# Patient Record
Sex: MALE | Race: WHITE | NOT HISPANIC OR LATINO | Employment: OTHER | URBAN - METROPOLITAN AREA
[De-identification: names, ages, dates, MRNs, and addresses within clinical notes are randomized per-mention and may not be internally consistent; named-entity substitution may affect disease eponyms.]

---

## 2017-02-03 ENCOUNTER — APPOINTMENT (OUTPATIENT)
Dept: GENERAL RADIOLOGY | Facility: HOSPITAL | Age: 72
End: 2017-02-03

## 2017-02-03 ENCOUNTER — HOSPITAL ENCOUNTER (EMERGENCY)
Facility: HOSPITAL | Age: 72
Discharge: HOME | End: 2017-02-03
Attending: EMERGENCY MEDICINE | Admitting: EMERGENCY MEDICINE

## 2017-02-03 VITALS
HEIGHT: 68 IN | HEART RATE: 64 BPM | RESPIRATION RATE: 18 BRPM | BODY MASS INDEX: 20.76 KG/M2 | WEIGHT: 137 LBS | TEMPERATURE: 98.3 F | OXYGEN SATURATION: 98 % | SYSTOLIC BLOOD PRESSURE: 132 MMHG | DIASTOLIC BLOOD PRESSURE: 64 MMHG

## 2017-02-03 DIAGNOSIS — K59.00 CONSTIPATION, UNSPECIFIED CONSTIPATION TYPE: Primary | ICD-10-CM

## 2017-02-03 PROCEDURE — 99284 EMERGENCY DEPT VISIT MOD MDM: CPT

## 2017-02-03 PROCEDURE — 74000 HC ABDOMEN KUB: CPT

## 2017-02-03 RX ORDER — DOCUSATE SODIUM 100 MG/1
100 CAPSULE, LIQUID FILLED ORAL 2 TIMES DAILY PRN
Qty: 60 CAPSULE | Refills: 0 | Status: SHIPPED | OUTPATIENT
Start: 2017-02-03

## 2017-02-04 NOTE — ED PROVIDER NOTES
"Subjective   Patient is a 71 y.o. male presenting with constipation.   History provided by:  Patient   used: No    Constipation   Severity:  Mild  Time since last bowel movement:  3 days  Timing:  Intermittent  Progression:  Waxing and waning  Chronicity:  Recurrent  Context: not dietary changes, not medication and not stress    Stool description:  Formed and large  Relieved by:  Miralax  Worsened by:  Nothing  Ineffective treatments:  None tried  Associated symptoms: flatus    Associated symptoms: no abdominal pain, no anorexia, no back pain, no diarrhea, no fever, no nausea and no vomiting    Risk factors: no change in medication      71-year-old male states he was referred to emergency department by his primary care provider for constipation and evaluation for possible \"fecal impaction\".  States no bowel movement around 3 days.  Started taking MiraLAX this morning.  No fevers chills sweats abdominal pain chest pain decreased or increased flatus, no melena hematochezia flank pain dysuria hematuria pyuria.    Review of Systems   Constitutional: Negative for fever.   Gastrointestinal: Positive for constipation and flatus. Negative for abdominal pain, anorexia, diarrhea, nausea and vomiting.   Musculoskeletal: Negative for back pain.   All other systems reviewed and are negative.      Past Medical History   Diagnosis Date   • Coronary artery disease    • Diabetes mellitus    • Diabetic retinopathy    • Hypertension    • Injury of back    • Legally blind    • Myocardial infarction    • Stroke        No Known Allergies    Past Surgical History   Procedure Laterality Date   • Cardiac surgery       cardiac stents   • Cardiac surgery       CABG   • Coronary angioplasty with stent placement     • Toe amputation     • Cataract extraction     • Eye surgery         History reviewed. No pertinent family history.    Social History     Social History   • Marital status:      Spouse name: N/A   • Number " of children: N/A   • Years of education: N/A     Social History Main Topics   • Smoking status: Current Every Day Smoker     Packs/day: 1.00   • Smokeless tobacco: None   • Alcohol use No   • Drug use: No   • Sexual activity: Not Asked     Other Topics Concern   • None     Social History Narrative           Objective   Physical Exam   Constitutional: He is oriented to person, place, and time. He appears well-developed and well-nourished. No distress.   HENT:   Head: Normocephalic and atraumatic.   Right Ear: External ear normal.   Left Ear: External ear normal.   Nose: Nose normal.   Mouth/Throat: Oropharynx is clear and moist. No oropharyngeal exudate.   Eyes: Conjunctivae and EOM are normal. Pupils are equal, round, and reactive to light. Right eye exhibits no discharge. Left eye exhibits no discharge. No scleral icterus.   Neck: Normal range of motion. Neck supple. No JVD present. No tracheal deviation present. No thyromegaly present.   Cardiovascular: Normal rate, regular rhythm, normal heart sounds and intact distal pulses.  Exam reveals no gallop and no friction rub.    No murmur heard.  Pulmonary/Chest: Effort normal and breath sounds normal. No stridor. No respiratory distress. He has no wheezes. He has no rales. He exhibits no tenderness.   Abdominal: Soft. Bowel sounds are normal. He exhibits no distension and no mass. There is no tenderness. There is no rebound and no guarding. No hernia.   Musculoskeletal: Normal range of motion. He exhibits no edema, tenderness or deformity.   Lymphadenopathy:     He has no cervical adenopathy.   Neurological: He is alert and oriented to person, place, and time. He has normal reflexes. He displays normal reflexes. No cranial nerve deficit. He exhibits normal muscle tone. Coordination normal.   Skin: Skin is warm and dry. No rash noted. He is not diaphoretic. No erythema. No pallor.   Psychiatric: He has a normal mood and affect. His behavior is normal. Judgment and  thought content normal.   Nursing note and vitals reviewed.      Procedures        No results found for this or any previous visit (from the past 24 hour(s)).  Note: In addition to lab results from this visit, the labs listed above may include labs taken at another facility or during a different encounter within the last 24 hours. Please correlate lab times with ED admission and discharge times for further clarification of the services performed during this visit.    XR Abdomen KUB   ED Interpretation   Increased stool         Vitals:    02/03/17 2115 02/03/17 2215 02/03/17 2300 02/03/17 2351   BP: 154/86 158/86 155/85 132/64   BP Location:    Right arm   Patient Position:    Sitting   Pulse: 74 71  64   Resp:    18   Temp:       TempSrc:       SpO2: 98% 98%     Weight:       Height:         Medications - No data to display  ECG/EMG Results (last 24 hours)     ** No results found for the last 24 hours. **      \    ED Course  ED Course   Comment By Time   Pt had large bowel movement while waiting to be seen Hector Orourke PA-C 02/04 0430                  [facility]    Final diagnoses:   Constipation, unspecified constipation type            Hector Orourke PA-C  02/04/17 0431

## 2017-02-04 NOTE — DISCHARGE INSTRUCTIONS
Continue taking your MiraLAX until your bowel movements are the consistency of softserve ice cream.  Follow-up with Dr. Ovalles early next week.  Return if any change or worsening.

## 2017-02-10 ENCOUNTER — TRANSCRIBE ORDERS (OUTPATIENT)
Dept: ADMINISTRATIVE | Facility: HOSPITAL | Age: 72
End: 2017-02-10

## 2017-02-10 DIAGNOSIS — Z87.891 PERSONAL HISTORY OF TOBACCO USE, PRESENTING HAZARDS TO HEALTH: Primary | ICD-10-CM

## 2017-02-16 ENCOUNTER — HOSPITAL ENCOUNTER (OUTPATIENT)
Dept: ULTRASOUND IMAGING | Facility: HOSPITAL | Age: 72
Discharge: HOME | End: 2017-02-16
Attending: INTERNAL MEDICINE

## 2017-02-16 DIAGNOSIS — Z87.891 PERSONAL HISTORY OF TOBACCO USE, PRESENTING HAZARDS TO HEALTH: ICD-10-CM

## 2017-02-16 PROCEDURE — 76775 US EXAM ABDO BACK WALL LIM: CPT

## 2017-02-20 ENCOUNTER — TRANSCRIBE ORDERS (OUTPATIENT)
Dept: ADMINISTRATIVE | Facility: HOSPITAL | Age: 72
End: 2017-02-20

## 2017-02-20 DIAGNOSIS — N28.9 POOR KIDNEY FUNCTION: Primary | ICD-10-CM

## 2017-02-27 ENCOUNTER — HOSPITAL ENCOUNTER (OUTPATIENT)
Dept: ULTRASOUND IMAGING | Facility: HOSPITAL | Age: 72
Discharge: HOME | End: 2017-02-27
Admitting: NURSE PRACTITIONER

## 2017-02-27 DIAGNOSIS — N28.9 POOR KIDNEY FUNCTION: ICD-10-CM

## 2017-02-27 PROCEDURE — 76775 US EXAM ABDO BACK WALL LIM: CPT

## 2017-04-21 ENCOUNTER — APPOINTMENT (OUTPATIENT)
Dept: GENERAL RADIOLOGY | Facility: HOSPITAL | Age: 72
End: 2017-04-21

## 2017-04-21 ENCOUNTER — HOSPITAL ENCOUNTER (EMERGENCY)
Facility: HOSPITAL | Age: 72
Discharge: HOME | End: 2017-04-21
Attending: EMERGENCY MEDICINE | Admitting: EMERGENCY MEDICINE

## 2017-04-21 VITALS
BODY MASS INDEX: 21.22 KG/M2 | OXYGEN SATURATION: 99 % | SYSTOLIC BLOOD PRESSURE: 131 MMHG | DIASTOLIC BLOOD PRESSURE: 76 MMHG | RESPIRATION RATE: 20 BRPM | WEIGHT: 140 LBS | TEMPERATURE: 98.1 F | HEIGHT: 68 IN | HEART RATE: 76 BPM

## 2017-04-21 DIAGNOSIS — S30.0XXA LUMBAR CONTUSION, INITIAL ENCOUNTER: ICD-10-CM
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
## 2017-04-21 DIAGNOSIS — S39.012A LOW BACK STRAIN, INITIAL ENCOUNTER: Primary | ICD-10-CM

## 2017-04-21 PROCEDURE — 99284 EMERGENCY DEPT VISIT MOD MDM: CPT

## 2017-04-21 PROCEDURE — 72100 X-RAY EXAM L-S SPINE 2/3 VWS: CPT

## 2017-04-21 RX ORDER — IBUPROFEN 400 MG/1
400 TABLET ORAL ONCE
Status: COMPLETED | OUTPATIENT
Start: 2017-04-21 | End: 2017-04-21

## 2017-04-21 RX ORDER — HYDROCODONE BITARTRATE AND ACETAMINOPHEN 5; 325 MG/1; MG/1
1 TABLET ORAL EVERY 4 HOURS PRN
Qty: 10 TABLET | Refills: 0 | Status: SHIPPED | OUTPATIENT
Start: 2017-04-21 | End: 2018-02-06 | Stop reason: SDUPTHER

## 2017-04-21 RX ORDER — POLYETHYLENE GLYCOL 3350 17 G/17G
17 POWDER, FOR SOLUTION ORAL DAILY
COMMUNITY
End: 2021-01-15

## 2017-04-21 RX ORDER — HYDROCODONE BITARTRATE AND ACETAMINOPHEN 5; 325 MG/1; MG/1
1 TABLET ORAL ONCE
Status: COMPLETED | OUTPATIENT
Start: 2017-04-21 | End: 2017-04-21

## 2017-04-21 RX ADMIN — IBUPROFEN 400 MG: 400 TABLET ORAL at 05:35

## 2017-04-21 RX ADMIN — HYDROCODONE BITARTRATE AND ACETAMINOPHEN 1 TABLET: 5; 325 TABLET ORAL at 05:35
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
## 2017-04-24 ENCOUNTER — TRANSCRIBE ORDERS (OUTPATIENT)
Dept: ADMINISTRATIVE | Facility: HOSPITAL | Age: 72
End: 2017-04-24

## 2017-04-24 DIAGNOSIS — M54.9 CVA TENDERNESS: ICD-10-CM

## 2017-04-24 DIAGNOSIS — R60.0 LOCALIZED EDEMA: ICD-10-CM

## 2017-04-24 DIAGNOSIS — M54.50 LUMBAR SPINE PAIN: ICD-10-CM

## 2017-04-24 DIAGNOSIS — M54.6 THORACIC SPINE PAIN: Primary | ICD-10-CM

## 2017-04-24 DIAGNOSIS — R07.81 RIB PAIN ON RIGHT SIDE: ICD-10-CM

## 2017-05-02 ENCOUNTER — HOSPITAL ENCOUNTER (OUTPATIENT)
Dept: CT IMAGING | Facility: HOSPITAL | Age: 72
Discharge: HOME | End: 2017-05-02

## 2017-05-02 ENCOUNTER — HOSPITAL ENCOUNTER (OUTPATIENT)
Dept: CT IMAGING | Facility: HOSPITAL | Age: 72
Discharge: HOME | End: 2017-05-02
Admitting: PHYSICIAN ASSISTANT

## 2017-05-02 DIAGNOSIS — R60.0 LOCALIZED EDEMA: ICD-10-CM

## 2017-05-02 DIAGNOSIS — M54.6 THORACIC SPINE PAIN: ICD-10-CM

## 2017-05-02 DIAGNOSIS — R07.81 RIB PAIN ON RIGHT SIDE: ICD-10-CM

## 2017-05-02 DIAGNOSIS — M54.9 CVA TENDERNESS: ICD-10-CM

## 2017-05-02 DIAGNOSIS — M54.50 LUMBAR SPINE PAIN: ICD-10-CM

## 2017-05-02 PROCEDURE — 71250 CT THORAX DX C-: CPT

## 2017-05-02 PROCEDURE — 74177 CT ABD & PELVIS W/CONTRAST: CPT

## 2017-05-02 PROCEDURE — 0 IOPAMIDOL 61 % SOLUTION: Performed by: PHYSICIAN ASSISTANT

## 2017-05-02 PROCEDURE — 72128 CT CHEST SPINE W/O DYE: CPT

## 2017-05-02 PROCEDURE — 72131 CT LUMBAR SPINE W/O DYE: CPT

## 2017-05-02 PROCEDURE — 82565 ASSAY OF CREATININE: CPT

## 2017-05-02 RX ADMIN — IOPAMIDOL 98 ML: 612 INJECTION, SOLUTION INTRAVENOUS at 10:10

## 2017-05-05 LAB — CREAT BLDA-MCNC: 1.1 MG/DL (ref 0.6–1.3)

## 2017-08-03 ENCOUNTER — APPOINTMENT (OUTPATIENT)
Dept: GENERAL RADIOLOGY | Facility: HOSPITAL | Age: 72
End: 2017-08-03

## 2017-08-03 ENCOUNTER — HOSPITAL ENCOUNTER (EMERGENCY)
Facility: HOSPITAL | Age: 72
Discharge: HOME | End: 2017-08-04
Attending: EMERGENCY MEDICINE | Admitting: EMERGENCY MEDICINE

## 2017-08-03 ENCOUNTER — APPOINTMENT (OUTPATIENT)
Dept: CT IMAGING | Facility: HOSPITAL | Age: 72
End: 2017-08-03

## 2017-08-03 DIAGNOSIS — S22.32XA CLOSED FRACTURE OF ONE RIB OF LEFT SIDE, INITIAL ENCOUNTER: Primary | ICD-10-CM

## 2017-08-03 LAB
ALBUMIN SERPL-MCNC: 3.8 G/DL (ref 3.2–4.8)
ALBUMIN/GLOB SERPL: 1.5 G/DL (ref 1.5–2.5)
ALP SERPL-CCNC: 78 U/L (ref 25–100)
ALT SERPL W P-5'-P-CCNC: 22 U/L (ref 7–40)
ANION GAP SERPL CALCULATED.3IONS-SCNC: 2 MMOL/L (ref 3–11)
AST SERPL-CCNC: 22 U/L (ref 0–33)
BASOPHILS # BLD AUTO: 0.03 10*3/MM3 (ref 0–0.2)
BASOPHILS NFR BLD AUTO: 0.4 % (ref 0–1)
BILIRUB SERPL-MCNC: 0.4 MG/DL (ref 0.3–1.2)
BUN BLD-MCNC: 20 MG/DL (ref 9–23)
BUN/CREAT SERPL: 18.2 (ref 7–25)
CALCIUM SPEC-SCNC: 8.7 MG/DL (ref 8.7–10.4)
CHLORIDE SERPL-SCNC: 108 MMOL/L (ref 99–109)
CO2 SERPL-SCNC: 26 MMOL/L (ref 20–31)
CREAT BLD-MCNC: 1.1 MG/DL (ref 0.6–1.3)
DEPRECATED RDW RBC AUTO: 46.4 FL (ref 37–54)
EOSINOPHIL # BLD AUTO: 0.13 10*3/MM3 (ref 0–0.3)
EOSINOPHIL NFR BLD AUTO: 1.7 % (ref 0–3)
ERYTHROCYTE [DISTWIDTH] IN BLOOD BY AUTOMATED COUNT: 14.4 % (ref 11.3–14.5)
GFR SERPL CREATININE-BSD FRML MDRD: 66 ML/MIN/1.73
GLOBULIN UR ELPH-MCNC: 2.6 GM/DL
GLUCOSE BLD-MCNC: 236 MG/DL (ref 70–100)
HCT VFR BLD AUTO: 31.7 % (ref 38.9–50.9)
HGB BLD-MCNC: 10.5 G/DL (ref 13.1–17.5)
HOLD SPECIMEN: NORMAL
HOLD SPECIMEN: NORMAL
IMM GRANULOCYTES # BLD: 0.02 10*3/MM3 (ref 0–0.03)
IMM GRANULOCYTES NFR BLD: 0.3 % (ref 0–0.6)
LYMPHOCYTES # BLD AUTO: 0.97 10*3/MM3 (ref 0.6–4.8)
LYMPHOCYTES NFR BLD AUTO: 12.7 % (ref 24–44)
MAGNESIUM SERPL-MCNC: 1.8 MG/DL (ref 1.3–2.7)
MCH RBC QN AUTO: 28.9 PG (ref 27–31)
MCHC RBC AUTO-ENTMCNC: 33.1 G/DL (ref 32–36)
MCV RBC AUTO: 87.3 FL (ref 80–99)
MONOCYTES # BLD AUTO: 0.82 10*3/MM3 (ref 0–1)
MONOCYTES NFR BLD AUTO: 10.7 % (ref 0–12)
NEUTROPHILS # BLD AUTO: 5.67 10*3/MM3 (ref 1.5–8.3)
NEUTROPHILS NFR BLD AUTO: 74.2 % (ref 41–71)
PLATELET # BLD AUTO: 164 10*3/MM3 (ref 150–450)
PMV BLD AUTO: 10.3 FL (ref 6–12)
POTASSIUM BLD-SCNC: 4.6 MMOL/L (ref 3.5–5.5)
PROT SERPL-MCNC: 6.4 G/DL (ref 5.7–8.2)
RBC # BLD AUTO: 3.63 10*6/MM3 (ref 4.2–5.76)
SODIUM BLD-SCNC: 136 MMOL/L (ref 132–146)
TROPONIN I SERPL-MCNC: 0 NG/ML (ref 0–0.07)
WBC NRBC COR # BLD: 7.64 10*3/MM3 (ref 3.5–10.8)
WHOLE BLOOD HOLD SPECIMEN: NORMAL
WHOLE BLOOD HOLD SPECIMEN: NORMAL

## 2017-08-03 PROCEDURE — 71101 X-RAY EXAM UNILAT RIBS/CHEST: CPT

## 2017-08-03 PROCEDURE — 0 IOPAMIDOL 61 % SOLUTION: Performed by: EMERGENCY MEDICINE

## 2017-08-03 PROCEDURE — 80053 COMPREHEN METABOLIC PANEL: CPT | Performed by: EMERGENCY MEDICINE

## 2017-08-03 PROCEDURE — 25010000002 ONDANSETRON PER 1 MG: Performed by: EMERGENCY MEDICINE

## 2017-08-03 PROCEDURE — 74177 CT ABD & PELVIS W/CONTRAST: CPT

## 2017-08-03 PROCEDURE — 99284 EMERGENCY DEPT VISIT MOD MDM: CPT

## 2017-08-03 PROCEDURE — 25010000002 MORPHINE PER 10 MG: Performed by: EMERGENCY MEDICINE

## 2017-08-03 PROCEDURE — 96374 THER/PROPH/DIAG INJ IV PUSH: CPT

## 2017-08-03 PROCEDURE — 93005 ELECTROCARDIOGRAM TRACING: CPT

## 2017-08-03 PROCEDURE — 96361 HYDRATE IV INFUSION ADD-ON: CPT

## 2017-08-03 PROCEDURE — 85025 COMPLETE CBC W/AUTO DIFF WBC: CPT

## 2017-08-03 PROCEDURE — 83735 ASSAY OF MAGNESIUM: CPT | Performed by: EMERGENCY MEDICINE

## 2017-08-03 PROCEDURE — 84484 ASSAY OF TROPONIN QUANT: CPT

## 2017-08-03 PROCEDURE — 96375 TX/PRO/DX INJ NEW DRUG ADDON: CPT

## 2017-08-03 RX ORDER — MORPHINE SULFATE 4 MG/ML
4 INJECTION, SOLUTION INTRAMUSCULAR; INTRAVENOUS ONCE
Status: COMPLETED | OUTPATIENT
Start: 2017-08-03 | End: 2017-08-03

## 2017-08-03 RX ORDER — DORZOLAMIDE HCL 20 MG/ML
1 SOLUTION/ DROPS OPHTHALMIC 2 TIMES DAILY
COMMUNITY

## 2017-08-03 RX ORDER — SODIUM CHLORIDE 0.9 % (FLUSH) 0.9 %
10 SYRINGE (ML) INJECTION AS NEEDED
Status: DISCONTINUED | OUTPATIENT
Start: 2017-08-03 | End: 2017-08-04 | Stop reason: HOSPADM

## 2017-08-03 RX ORDER — LATANOPROST 50 UG/ML
1 SOLUTION/ DROPS OPHTHALMIC NIGHTLY
COMMUNITY

## 2017-08-03 RX ORDER — FLUDROCORTISONE ACETATE 0.1 MG/1
0.1 TABLET ORAL DAILY
COMMUNITY

## 2017-08-03 RX ORDER — MELATONIN
1000 DAILY
COMMUNITY
End: 2018-05-18

## 2017-08-03 RX ORDER — ONDANSETRON 2 MG/ML
4 INJECTION INTRAMUSCULAR; INTRAVENOUS ONCE
Status: COMPLETED | OUTPATIENT
Start: 2017-08-03 | End: 2017-08-03

## 2017-08-03 RX ORDER — LORATADINE 10 MG/1
CAPSULE, LIQUID FILLED ORAL
COMMUNITY
End: 2018-05-18 | Stop reason: SDUPTHER

## 2017-08-03 RX ORDER — FUROSEMIDE 20 MG/1
20 TABLET ORAL 2 TIMES DAILY
COMMUNITY
End: 2018-02-03 | Stop reason: HOSPADM

## 2017-08-03 RX ADMIN — ONDANSETRON 4 MG: 2 INJECTION INTRAMUSCULAR; INTRAVENOUS at 21:17

## 2017-08-03 RX ADMIN — IOPAMIDOL 75 ML: 612 INJECTION, SOLUTION INTRAVENOUS at 21:44

## 2017-08-03 RX ADMIN — SODIUM CHLORIDE 1000 ML: 9 INJECTION, SOLUTION INTRAVENOUS at 21:21

## 2017-08-03 RX ADMIN — MORPHINE SULFATE 4 MG: 4 INJECTION, SOLUTION INTRAMUSCULAR; INTRAVENOUS at 21:20

## 2017-08-04 VITALS
SYSTOLIC BLOOD PRESSURE: 150 MMHG | RESPIRATION RATE: 20 BRPM | HEIGHT: 68 IN | DIASTOLIC BLOOD PRESSURE: 81 MMHG | HEART RATE: 79 BPM | WEIGHT: 135 LBS | OXYGEN SATURATION: 91 % | BODY MASS INDEX: 20.46 KG/M2 | TEMPERATURE: 98.1 F

## 2017-08-04 RX ORDER — HYDROCODONE BITARTRATE AND ACETAMINOPHEN 5; 325 MG/1; MG/1
1 TABLET ORAL EVERY 6 HOURS PRN
Qty: 15 TABLET | Refills: 0 | Status: SHIPPED | OUTPATIENT
Start: 2017-08-04 | End: 2018-02-03 | Stop reason: HOSPADM

## 2017-08-04 NOTE — ED PROVIDER NOTES
Subjective   HPI Comments: Mr. Martin Moctezuma is a 71 y.o. male who presents to the ED after a fall. He notes today that he felt as though he was hypoglycemic. He went to sit down to orient himself, missed his chair, and hit the floor on his back. He then reports that he had a syncopal episode shortly after and did not wake back up until EMS arrived. He was told his blood glucose was less than 20. He ate two peanut butter sandwiches and his blood sugar greatly elevated past 250. Since EMS left his house, he complains of pain in his lower back and left rib pain. He denies any SoA, or any other injuries or pains at this time. He has a hx of COPD and DM. He denies any anticoagulation use.     Patient is a 71 y.o. male presenting with fall.   History provided by:  Patient  Fall   Mechanism of injury: fall    Injury location:  Torso  Torso injury location:  Back  Incident location:  Home  Time since incident:  1 day  Arrived directly from scene: no    Fall:     Fall occurred: Trying to sit down.    Impact surface:  Unable to specify    Point of impact:  Back  Suspicion of alcohol use: no    Suspicion of drug use: no    Associated symptoms: back pain (Low) and chest pain (Left rib)    Associated symptoms: no abdominal pain and no neck pain    Risk factors: COPD and diabetes    Risk factors: no anticoagulation therapy        Review of Systems   Respiratory: Negative for shortness of breath.    Cardiovascular: Positive for chest pain (Left rib).   Gastrointestinal: Negative for abdominal pain.   Genitourinary: Negative for flank pain.   Musculoskeletal: Positive for back pain (Low). Negative for arthralgias and neck pain.   Neurological: Positive for syncope.   All other systems reviewed and are negative.      Past Medical History:   Diagnosis Date   • Coronary artery disease    • Diabetes mellitus    • Diabetic retinopathy    • Hypertension    • Injury of back    • Legally blind    • Myocardial infarction    • Stroke         No Known Allergies    Past Surgical History:   Procedure Laterality Date   • CARDIAC SURGERY      cardiac stents   • CARDIAC SURGERY      CABG   • CATARACT EXTRACTION     • CORONARY ANGIOPLASTY WITH STENT PLACEMENT     • EYE SURGERY     • TOE AMPUTATION         History reviewed. No pertinent family history.    Social History     Social History   • Marital status:      Spouse name: N/A   • Number of children: N/A   • Years of education: N/A     Social History Main Topics   • Smoking status: Current Every Day Smoker     Packs/day: 1.00   • Smokeless tobacco: None   • Alcohol use No   • Drug use: No   • Sexual activity: Not Asked     Other Topics Concern   • None     Social History Narrative         Objective   Physical Exam   Constitutional: He is oriented to person, place, and time. He appears well-developed and well-nourished. No distress.   HENT:   Head: Normocephalic and atraumatic.   Nose: Nose normal.   Eyes: Conjunctivae are normal. No scleral icterus.   Neck: Normal range of motion. Neck supple.   Cardiovascular: Normal rate, regular rhythm, normal heart sounds and intact distal pulses.    No murmur heard.  Pulmonary/Chest: Effort normal and breath sounds normal. No respiratory distress.   Abdominal: Soft. There is tenderness (Moderate RUQ). There is no rebound and no guarding.   Musculoskeletal: Normal range of motion. He exhibits tenderness.   Tenderness without deformity or crepitus over left CVA as well as lateral chest   Neurological: He is alert and oriented to person, place, and time.   Skin: Skin is warm and dry. He is not diaphoretic.   Abrasion on left flank over the 12th rib.   Psychiatric: He has a normal mood and affect. His behavior is normal.   Nursing note and vitals reviewed.      Procedures         ED Course  ED Course     CT negative for spleen or intra-abdominal injury but shows 11th rib fracture.  Labs benign.  CXR negative.  Patient stable on serial rechecks.  Discussed  findings, concerns, plan of care, expected course, reasons to return and followup.                  MDM  Number of Diagnoses or Management Options  Closed fracture of one rib of left side, initial encounter:      Amount and/or Complexity of Data Reviewed  Clinical lab tests: ordered and reviewed  Tests in the radiology section of CPT®: ordered and reviewed  Independent visualization of images, tracings, or specimens: yes        Final diagnoses:   Closed fracture of one rib of left side, initial encounter       Documentation assistance provided by iris BERRIOS.  Information recorded by the scribe was done at my direction and has been verified and validated by me.     Adore Berrios  08/03/17 3857       Torres Hollingsworth MD  08/04/17 0142

## 2018-01-29 ENCOUNTER — ANESTHESIA EVENT (OUTPATIENT)
Dept: PERIOP | Facility: HOSPITAL | Age: 73
End: 2018-01-29

## 2018-01-29 ENCOUNTER — APPOINTMENT (OUTPATIENT)
Dept: GENERAL RADIOLOGY | Facility: HOSPITAL | Age: 73
End: 2018-01-29

## 2018-01-29 ENCOUNTER — ANESTHESIA (OUTPATIENT)
Dept: PERIOP | Facility: HOSPITAL | Age: 73
End: 2018-01-29

## 2018-01-29 ENCOUNTER — HOSPITAL ENCOUNTER (INPATIENT)
Facility: HOSPITAL | Age: 73
LOS: 5 days | Discharge: NURSING, REHAB OR LONG-TERM CARE | End: 2018-02-03
Attending: EMERGENCY MEDICINE | Admitting: INTERNAL MEDICINE

## 2018-01-29 DIAGNOSIS — Z78.9 IMPAIRED MOBILITY AND ADLS: ICD-10-CM

## 2018-01-29 DIAGNOSIS — Z74.09 IMPAIRED FUNCTIONAL MOBILITY, BALANCE, GAIT, AND ENDURANCE: ICD-10-CM

## 2018-01-29 DIAGNOSIS — S72.141A CLOSED DISPLACED INTERTROCHANTERIC FRACTURE OF RIGHT FEMUR, INITIAL ENCOUNTER (HCC): ICD-10-CM

## 2018-01-29 DIAGNOSIS — S72.001A CLOSED FRACTURE OF RIGHT HIP, INITIAL ENCOUNTER (HCC): Primary | ICD-10-CM

## 2018-01-29 DIAGNOSIS — Z74.09 IMPAIRED MOBILITY AND ADLS: ICD-10-CM

## 2018-01-29 PROBLEM — F17.200 TOBACCO DEPENDENCE: Status: ACTIVE | Noted: 2018-01-29

## 2018-01-29 LAB
ABO GROUP BLD: NORMAL
ABO GROUP BLD: NORMAL
ANION GAP SERPL CALCULATED.3IONS-SCNC: 5 MMOL/L (ref 3–11)
BASOPHILS # BLD AUTO: 0.02 10*3/MM3 (ref 0–0.2)
BASOPHILS NFR BLD AUTO: 0.2 % (ref 0–1)
BLD GP AB SCN SERPL QL: NEGATIVE
BUN BLD-MCNC: 26 MG/DL (ref 9–23)
BUN/CREAT SERPL: 20 (ref 7–25)
CALCIUM SPEC-SCNC: 8.9 MG/DL (ref 8.7–10.4)
CHLORIDE SERPL-SCNC: 104 MMOL/L (ref 99–109)
CO2 SERPL-SCNC: 25 MMOL/L (ref 20–31)
CREAT BLD-MCNC: 1.3 MG/DL (ref 0.6–1.3)
DEPRECATED RDW RBC AUTO: 45.2 FL (ref 37–54)
EOSINOPHIL # BLD AUTO: 0.03 10*3/MM3 (ref 0–0.3)
EOSINOPHIL NFR BLD AUTO: 0.3 % (ref 0–3)
ERYTHROCYTE [DISTWIDTH] IN BLOOD BY AUTOMATED COUNT: 14.3 % (ref 11.3–14.5)
GFR SERPL CREATININE-BSD FRML MDRD: 55 ML/MIN/1.73
GLUCOSE BLD-MCNC: 203 MG/DL (ref 70–100)
GLUCOSE BLDC GLUCOMTR-MCNC: 109 MG/DL (ref 70–130)
GLUCOSE BLDC GLUCOMTR-MCNC: 193 MG/DL (ref 70–130)
GLUCOSE BLDC GLUCOMTR-MCNC: 196 MG/DL (ref 70–130)
HBA1C MFR BLD: 8.8 % (ref 4.8–5.6)
HCT VFR BLD AUTO: 29.7 % (ref 38.9–50.9)
HGB BLD-MCNC: 9.9 G/DL (ref 13.1–17.5)
IMM GRANULOCYTES # BLD: 0.02 10*3/MM3 (ref 0–0.03)
IMM GRANULOCYTES NFR BLD: 0.2 % (ref 0–0.6)
INR PPP: 0.98
LYMPHOCYTES # BLD AUTO: 0.77 10*3/MM3 (ref 0.6–4.8)
LYMPHOCYTES NFR BLD AUTO: 8 % (ref 24–44)
MCH RBC QN AUTO: 28.9 PG (ref 27–31)
MCHC RBC AUTO-ENTMCNC: 33.3 G/DL (ref 32–36)
MCV RBC AUTO: 86.6 FL (ref 80–99)
MONOCYTES # BLD AUTO: 0.95 10*3/MM3 (ref 0–1)
MONOCYTES NFR BLD AUTO: 9.9 % (ref 0–12)
NEUTROPHILS # BLD AUTO: 7.82 10*3/MM3 (ref 1.5–8.3)
NEUTROPHILS NFR BLD AUTO: 81.4 % (ref 41–71)
PLATELET # BLD AUTO: 160 10*3/MM3 (ref 150–450)
PMV BLD AUTO: 11 FL (ref 6–12)
POTASSIUM BLD-SCNC: 4.5 MMOL/L (ref 3.5–5.5)
PROTHROMBIN TIME: 10.7 SECONDS (ref 9.6–11.5)
RBC # BLD AUTO: 3.43 10*6/MM3 (ref 4.2–5.76)
RH BLD: POSITIVE
RH BLD: POSITIVE
SODIUM BLD-SCNC: 134 MMOL/L (ref 132–146)
WBC NRBC COR # BLD: 9.61 10*3/MM3 (ref 3.5–10.8)

## 2018-01-29 PROCEDURE — 0QS634Z REPOSITION RIGHT UPPER FEMUR WITH INTERNAL FIXATION DEVICE, PERCUTANEOUS APPROACH: ICD-10-PCS | Performed by: ORTHOPAEDIC SURGERY

## 2018-01-29 PROCEDURE — C1713 ANCHOR/SCREW BN/BN,TIS/BN: HCPCS | Performed by: ORTHOPAEDIC SURGERY

## 2018-01-29 PROCEDURE — 80048 BASIC METABOLIC PNL TOTAL CA: CPT | Performed by: EMERGENCY MEDICINE

## 2018-01-29 PROCEDURE — 76001 HC FLUORO GREATER THAN 1 HOUR: CPT

## 2018-01-29 PROCEDURE — 86850 RBC ANTIBODY SCREEN: CPT | Performed by: ORTHOPAEDIC SURGERY

## 2018-01-29 PROCEDURE — 25010000002 ONDANSETRON PER 1 MG: Performed by: NURSE ANESTHETIST, CERTIFIED REGISTERED

## 2018-01-29 PROCEDURE — 86900 BLOOD TYPING SEROLOGIC ABO: CPT

## 2018-01-29 PROCEDURE — 25010000003 CEFAZOLIN IN DEXTROSE 2-4 GM/100ML-% SOLUTION: Performed by: ORTHOPAEDIC SURGERY

## 2018-01-29 PROCEDURE — 83036 HEMOGLOBIN GLYCOSYLATED A1C: CPT | Performed by: INTERNAL MEDICINE

## 2018-01-29 PROCEDURE — 85025 COMPLETE CBC W/AUTO DIFF WBC: CPT | Performed by: EMERGENCY MEDICINE

## 2018-01-29 PROCEDURE — 86900 BLOOD TYPING SEROLOGIC ABO: CPT | Performed by: ORTHOPAEDIC SURGERY

## 2018-01-29 PROCEDURE — 86901 BLOOD TYPING SEROLOGIC RH(D): CPT

## 2018-01-29 PROCEDURE — 73552 X-RAY EXAM OF FEMUR 2/>: CPT

## 2018-01-29 PROCEDURE — 73501 X-RAY EXAM HIP UNI 1 VIEW: CPT

## 2018-01-29 PROCEDURE — 99223 1ST HOSP IP/OBS HIGH 75: CPT | Performed by: INTERNAL MEDICINE

## 2018-01-29 PROCEDURE — 85610 PROTHROMBIN TIME: CPT | Performed by: EMERGENCY MEDICINE

## 2018-01-29 PROCEDURE — 25810000003 SODIUM CHLORIDE 0.9 % WITH KCL 20 MEQ 20-0.9 MEQ/L-% SOLUTION: Performed by: ORTHOPAEDIC SURGERY

## 2018-01-29 PROCEDURE — 99285 EMERGENCY DEPT VISIT HI MDM: CPT

## 2018-01-29 PROCEDURE — 25010000002 ROPIVACAINE PER 1 MG: Performed by: NURSE ANESTHETIST, CERTIFIED REGISTERED

## 2018-01-29 PROCEDURE — 93005 ELECTROCARDIOGRAM TRACING: CPT | Performed by: EMERGENCY MEDICINE

## 2018-01-29 PROCEDURE — 99222 1ST HOSP IP/OBS MODERATE 55: CPT | Performed by: ORTHOPAEDIC SURGERY

## 2018-01-29 PROCEDURE — 63710000001 INSULIN LISPRO (HUMAN) PER 5 UNITS: Performed by: INTERNAL MEDICINE

## 2018-01-29 PROCEDURE — 94640 AIRWAY INHALATION TREATMENT: CPT

## 2018-01-29 PROCEDURE — 25010000002 FENTANYL CITRATE (PF) 100 MCG/2ML SOLUTION: Performed by: NURSE ANESTHETIST, CERTIFIED REGISTERED

## 2018-01-29 PROCEDURE — 25010000002 PROPOFOL 10 MG/ML EMULSION: Performed by: NURSE ANESTHETIST, CERTIFIED REGISTERED

## 2018-01-29 PROCEDURE — 72170 X-RAY EXAM OF PELVIS: CPT

## 2018-01-29 PROCEDURE — 25010000002 NEOSTIGMINE 10 MG/10ML SOLUTION: Performed by: NURSE ANESTHETIST, CERTIFIED REGISTERED

## 2018-01-29 PROCEDURE — 25010000002 HYDROMORPHONE PER 4 MG: Performed by: EMERGENCY MEDICINE

## 2018-01-29 PROCEDURE — 76000 FLUOROSCOPY <1 HR PHYS/QHP: CPT

## 2018-01-29 PROCEDURE — 63710000001 INSULIN LISPRO (HUMAN) PER 5 UNITS

## 2018-01-29 PROCEDURE — 82962 GLUCOSE BLOOD TEST: CPT

## 2018-01-29 PROCEDURE — 25010000002 ONDANSETRON PER 1 MG: Performed by: EMERGENCY MEDICINE

## 2018-01-29 PROCEDURE — 27245 TREAT THIGH FRACTURE: CPT | Performed by: ORTHOPAEDIC SURGERY

## 2018-01-29 PROCEDURE — 86901 BLOOD TYPING SEROLOGIC RH(D): CPT | Performed by: ORTHOPAEDIC SURGERY

## 2018-01-29 DEVICE — SCRW LK STRDRV TI 5X36MM STRL: Type: IMPLANTABLE DEVICE | Site: TROCHANTER | Status: FUNCTIONAL

## 2018-01-29 DEVICE — BLD FEM FIX HELI TFN ADV 95MM STRL: Type: IMPLANTABLE DEVICE | Site: TROCHANTER | Status: FUNCTIONAL

## 2018-01-29 DEVICE — NAIL FEM TFN ADV PROX 130D SHT 10X170MM STRL: Type: IMPLANTABLE DEVICE | Site: TROCHANTER | Status: FUNCTIONAL

## 2018-01-29 RX ORDER — DOCUSATE SODIUM 100 MG/1
100 CAPSULE, LIQUID FILLED ORAL 2 TIMES DAILY PRN
Status: DISCONTINUED | OUTPATIENT
Start: 2018-01-29 | End: 2018-01-29

## 2018-01-29 RX ORDER — DORZOLAMIDE HCL 20 MG/ML
1 SOLUTION/ DROPS OPHTHALMIC EVERY 8 HOURS SCHEDULED
Status: DISCONTINUED | OUTPATIENT
Start: 2018-01-29 | End: 2018-02-03 | Stop reason: HOSPADM

## 2018-01-29 RX ORDER — ONDANSETRON 2 MG/ML
INJECTION INTRAMUSCULAR; INTRAVENOUS AS NEEDED
Status: DISCONTINUED | OUTPATIENT
Start: 2018-01-29 | End: 2018-01-29 | Stop reason: SURG

## 2018-01-29 RX ORDER — DOCUSATE SODIUM 100 MG/1
100 CAPSULE, LIQUID FILLED ORAL 2 TIMES DAILY PRN
Status: DISCONTINUED | OUTPATIENT
Start: 2018-01-29 | End: 2018-02-03 | Stop reason: HOSPADM

## 2018-01-29 RX ORDER — CEFAZOLIN SODIUM 2 G/100ML
2 INJECTION, SOLUTION INTRAVENOUS EVERY 8 HOURS
Status: COMPLETED | OUTPATIENT
Start: 2018-01-29 | End: 2018-01-30

## 2018-01-29 RX ORDER — SODIUM CHLORIDE 0.9 % (FLUSH) 0.9 %
1-10 SYRINGE (ML) INJECTION AS NEEDED
Status: DISCONTINUED | OUTPATIENT
Start: 2018-01-29 | End: 2018-02-03 | Stop reason: HOSPADM

## 2018-01-29 RX ORDER — NICOTINE POLACRILEX 4 MG
15 LOZENGE BUCCAL
Status: DISCONTINUED | OUTPATIENT
Start: 2018-01-29 | End: 2018-02-03 | Stop reason: HOSPADM

## 2018-01-29 RX ORDER — ACETAMINOPHEN 325 MG/1
650 TABLET ORAL ONCE AS NEEDED
Status: DISCONTINUED | OUTPATIENT
Start: 2018-01-29 | End: 2018-01-29

## 2018-01-29 RX ORDER — SODIUM CHLORIDE 0.9 % (FLUSH) 0.9 %
10 SYRINGE (ML) INJECTION AS NEEDED
Status: DISCONTINUED | OUTPATIENT
Start: 2018-01-29 | End: 2018-02-03 | Stop reason: HOSPADM

## 2018-01-29 RX ORDER — CEFAZOLIN SODIUM 2 G/100ML
2 INJECTION, SOLUTION INTRAVENOUS ONCE
Status: COMPLETED | OUTPATIENT
Start: 2018-01-29 | End: 2018-01-29

## 2018-01-29 RX ORDER — PROMETHAZINE HYDROCHLORIDE 25 MG/1
25 TABLET ORAL ONCE AS NEEDED
Status: DISCONTINUED | OUTPATIENT
Start: 2018-01-29 | End: 2018-01-29

## 2018-01-29 RX ORDER — ASPIRIN 81 MG/1
81 TABLET ORAL DAILY
COMMUNITY

## 2018-01-29 RX ORDER — LIDOCAINE HYDROCHLORIDE 40 MG/ML
SOLUTION TOPICAL AS NEEDED
Status: DISCONTINUED | OUTPATIENT
Start: 2018-01-29 | End: 2018-01-29 | Stop reason: SURG

## 2018-01-29 RX ORDER — SODIUM CHLORIDE AND POTASSIUM CHLORIDE 150; 900 MG/100ML; MG/100ML
50 INJECTION, SOLUTION INTRAVENOUS CONTINUOUS
Status: DISCONTINUED | OUTPATIENT
Start: 2018-01-29 | End: 2018-01-31

## 2018-01-29 RX ORDER — SERTRALINE HYDROCHLORIDE 100 MG/1
100 TABLET, FILM COATED ORAL DAILY
Status: DISCONTINUED | OUTPATIENT
Start: 2018-01-29 | End: 2018-02-03 | Stop reason: HOSPADM

## 2018-01-29 RX ORDER — PENTOXIFYLLINE 400 MG/1
400 TABLET, EXTENDED RELEASE ORAL
COMMUNITY
End: 2021-01-15

## 2018-01-29 RX ORDER — ONDANSETRON 2 MG/ML
4 INJECTION INTRAMUSCULAR; INTRAVENOUS EVERY 6 HOURS PRN
Status: DISCONTINUED | OUTPATIENT
Start: 2018-01-29 | End: 2018-02-03 | Stop reason: HOSPADM

## 2018-01-29 RX ORDER — ROPIVACAINE HYDROCHLORIDE 5 MG/ML
INJECTION, SOLUTION EPIDURAL; INFILTRATION; PERINEURAL AS NEEDED
Status: DISCONTINUED | OUTPATIENT
Start: 2018-01-29 | End: 2018-01-29 | Stop reason: SURG

## 2018-01-29 RX ORDER — PROMETHAZINE HYDROCHLORIDE 25 MG/1
25 SUPPOSITORY RECTAL ONCE AS NEEDED
Status: DISCONTINUED | OUTPATIENT
Start: 2018-01-29 | End: 2018-01-29

## 2018-01-29 RX ORDER — BUPROPION HYDROCHLORIDE 150 MG/1
150 TABLET, EXTENDED RELEASE ORAL EVERY 12 HOURS SCHEDULED
Status: DISCONTINUED | OUTPATIENT
Start: 2018-01-29 | End: 2018-02-03 | Stop reason: HOSPADM

## 2018-01-29 RX ORDER — BUDESONIDE AND FORMOTEROL FUMARATE DIHYDRATE 80; 4.5 UG/1; UG/1
2 AEROSOL RESPIRATORY (INHALATION)
Status: DISCONTINUED | OUTPATIENT
Start: 2018-01-29 | End: 2018-02-03 | Stop reason: HOSPADM

## 2018-01-29 RX ORDER — PROPOFOL 10 MG/ML
VIAL (ML) INTRAVENOUS AS NEEDED
Status: DISCONTINUED | OUTPATIENT
Start: 2018-01-29 | End: 2018-01-29 | Stop reason: SURG

## 2018-01-29 RX ORDER — CLOBETASOL PROPIONATE 0.5 MG/G
CREAM TOPICAL 2 TIMES DAILY
COMMUNITY
End: 2018-02-03 | Stop reason: HOSPADM

## 2018-01-29 RX ORDER — SODIUM CHLORIDE AND POTASSIUM CHLORIDE 150; 900 MG/100ML; MG/100ML
50 INJECTION, SOLUTION INTRAVENOUS CONTINUOUS
Status: DISCONTINUED | OUTPATIENT
Start: 2018-01-29 | End: 2018-01-29

## 2018-01-29 RX ORDER — LIDOCAINE HYDROCHLORIDE 10 MG/ML
INJECTION, SOLUTION EPIDURAL; INFILTRATION; INTRACAUDAL; PERINEURAL AS NEEDED
Status: DISCONTINUED | OUTPATIENT
Start: 2018-01-29 | End: 2018-01-29 | Stop reason: SURG

## 2018-01-29 RX ORDER — PENTOXIFYLLINE 400 MG/1
400 TABLET, EXTENDED RELEASE ORAL
Status: DISCONTINUED | OUTPATIENT
Start: 2018-01-29 | End: 2018-02-03 | Stop reason: HOSPADM

## 2018-01-29 RX ORDER — ACETAMINOPHEN 650 MG/1
650 SUPPOSITORY RECTAL ONCE AS NEEDED
Status: DISCONTINUED | OUTPATIENT
Start: 2018-01-29 | End: 2018-01-29

## 2018-01-29 RX ORDER — DEXTROSE MONOHYDRATE 25 G/50ML
25 INJECTION, SOLUTION INTRAVENOUS
Status: DISCONTINUED | OUTPATIENT
Start: 2018-01-29 | End: 2018-02-03 | Stop reason: HOSPADM

## 2018-01-29 RX ORDER — ROPIVACAINE HYDROCHLORIDE 2 MG/ML
8 INJECTION, SOLUTION EPIDURAL; INFILTRATION CONTINUOUS
Status: DISCONTINUED | OUTPATIENT
Start: 2018-01-29 | End: 2018-02-03 | Stop reason: HOSPADM

## 2018-01-29 RX ORDER — SODIUM CHLORIDE 0.9 % (FLUSH) 0.9 %
1-10 SYRINGE (ML) INJECTION AS NEEDED
Status: DISCONTINUED | OUTPATIENT
Start: 2018-01-29 | End: 2018-01-29 | Stop reason: HOSPADM

## 2018-01-29 RX ORDER — BISOPROLOL FUMARATE 5 MG/1
5 TABLET, FILM COATED ORAL DAILY
Status: DISCONTINUED | OUTPATIENT
Start: 2018-01-29 | End: 2018-02-03 | Stop reason: HOSPADM

## 2018-01-29 RX ORDER — RANITIDINE 150 MG/1
150 TABLET ORAL 2 TIMES DAILY
COMMUNITY
End: 2018-02-03 | Stop reason: HOSPADM

## 2018-01-29 RX ORDER — NEOSTIGMINE METHYLSULFATE 1 MG/ML
INJECTION, SOLUTION INTRAVENOUS AS NEEDED
Status: DISCONTINUED | OUTPATIENT
Start: 2018-01-29 | End: 2018-01-29 | Stop reason: SURG

## 2018-01-29 RX ORDER — LATANOPROST 50 UG/ML
1 SOLUTION/ DROPS OPHTHALMIC NIGHTLY
Status: DISCONTINUED | OUTPATIENT
Start: 2018-01-29 | End: 2018-02-03 | Stop reason: HOSPADM

## 2018-01-29 RX ORDER — HYDROCODONE BITARTRATE AND ACETAMINOPHEN 5; 325 MG/1; MG/1
1 TABLET ORAL EVERY 4 HOURS PRN
Status: DISCONTINUED | OUTPATIENT
Start: 2018-01-29 | End: 2018-01-29 | Stop reason: SDUPTHER

## 2018-01-29 RX ORDER — MAGNESIUM HYDROXIDE 1200 MG/15ML
LIQUID ORAL AS NEEDED
Status: DISCONTINUED | OUTPATIENT
Start: 2018-01-29 | End: 2018-01-29 | Stop reason: HOSPADM

## 2018-01-29 RX ORDER — IPRATROPIUM BROMIDE AND ALBUTEROL SULFATE 2.5; .5 MG/3ML; MG/3ML
3 SOLUTION RESPIRATORY (INHALATION) ONCE AS NEEDED
Status: DISCONTINUED | OUTPATIENT
Start: 2018-01-29 | End: 2018-01-29

## 2018-01-29 RX ORDER — NICOTINE 21 MG/24HR
1 PATCH, TRANSDERMAL 24 HOURS TRANSDERMAL
Status: DISCONTINUED | OUTPATIENT
Start: 2018-01-29 | End: 2018-02-03 | Stop reason: HOSPADM

## 2018-01-29 RX ORDER — PROMETHAZINE HYDROCHLORIDE 25 MG/ML
6.25 INJECTION, SOLUTION INTRAMUSCULAR; INTRAVENOUS ONCE AS NEEDED
Status: DISCONTINUED | OUTPATIENT
Start: 2018-01-29 | End: 2018-01-29

## 2018-01-29 RX ORDER — MULTIVITAMIN WITH IRON
TABLET ORAL
COMMUNITY
End: 2018-02-03 | Stop reason: HOSPADM

## 2018-01-29 RX ORDER — ROCURONIUM BROMIDE 10 MG/ML
INJECTION, SOLUTION INTRAVENOUS AS NEEDED
Status: DISCONTINUED | OUTPATIENT
Start: 2018-01-29 | End: 2018-01-29 | Stop reason: SURG

## 2018-01-29 RX ORDER — NITROGLYCERIN 0.4 MG/1
0.4 TABLET SUBLINGUAL
Status: DISCONTINUED | OUTPATIENT
Start: 2018-01-29 | End: 2018-02-03 | Stop reason: HOSPADM

## 2018-01-29 RX ORDER — HYDROCODONE BITARTRATE AND ACETAMINOPHEN 5; 325 MG/1; MG/1
1 TABLET ORAL EVERY 4 HOURS PRN
Status: DISCONTINUED | OUTPATIENT
Start: 2018-01-29 | End: 2018-02-03 | Stop reason: HOSPADM

## 2018-01-29 RX ORDER — FLUDROCORTISONE ACETATE 0.1 MG/1
0.1 TABLET ORAL DAILY
Status: DISCONTINUED | OUTPATIENT
Start: 2018-01-29 | End: 2018-02-03 | Stop reason: HOSPADM

## 2018-01-29 RX ORDER — GLYCOPYRROLATE 0.2 MG/ML
INJECTION INTRAMUSCULAR; INTRAVENOUS AS NEEDED
Status: DISCONTINUED | OUTPATIENT
Start: 2018-01-29 | End: 2018-01-29 | Stop reason: SURG

## 2018-01-29 RX ORDER — POTASSIUM CHLORIDE 750 MG/1
20 TABLET, FILM COATED, EXTENDED RELEASE ORAL 2 TIMES DAILY
COMMUNITY
End: 2018-02-03 | Stop reason: HOSPADM

## 2018-01-29 RX ORDER — CETIRIZINE HYDROCHLORIDE 10 MG/1
10 TABLET ORAL DAILY
Status: DISCONTINUED | OUTPATIENT
Start: 2018-01-29 | End: 2018-02-03 | Stop reason: HOSPADM

## 2018-01-29 RX ORDER — ROSUVASTATIN CALCIUM 20 MG/1
40 TABLET, COATED ORAL NIGHTLY
Status: DISCONTINUED | OUTPATIENT
Start: 2018-01-29 | End: 2018-02-03 | Stop reason: HOSPADM

## 2018-01-29 RX ORDER — BISOPROLOL FUMARATE 5 MG/1
5 TABLET, FILM COATED ORAL DAILY
COMMUNITY

## 2018-01-29 RX ORDER — HYDROMORPHONE HYDROCHLORIDE 1 MG/ML
1 INJECTION, SOLUTION INTRAMUSCULAR; INTRAVENOUS; SUBCUTANEOUS ONCE
Status: COMPLETED | OUTPATIENT
Start: 2018-01-29 | End: 2018-01-29

## 2018-01-29 RX ORDER — BISACODYL 10 MG
10 SUPPOSITORY, RECTAL RECTAL DAILY PRN
Status: DISCONTINUED | OUTPATIENT
Start: 2018-01-29 | End: 2018-02-03 | Stop reason: HOSPADM

## 2018-01-29 RX ORDER — ONDANSETRON 4 MG/1
4 TABLET, FILM COATED ORAL EVERY 6 HOURS PRN
Status: DISCONTINUED | OUTPATIENT
Start: 2018-01-29 | End: 2018-02-03 | Stop reason: HOSPADM

## 2018-01-29 RX ORDER — PANTOPRAZOLE SODIUM 40 MG/1
40 TABLET, DELAYED RELEASE ORAL
Status: DISCONTINUED | OUTPATIENT
Start: 2018-01-30 | End: 2018-02-03 | Stop reason: HOSPADM

## 2018-01-29 RX ORDER — FAMOTIDINE 20 MG/1
20 TABLET, FILM COATED ORAL
Status: DISCONTINUED | OUTPATIENT
Start: 2018-01-29 | End: 2018-01-29 | Stop reason: HOSPADM

## 2018-01-29 RX ORDER — NITROGLYCERIN 0.4 MG/1
0.4 TABLET SUBLINGUAL
COMMUNITY

## 2018-01-29 RX ORDER — ONDANSETRON 2 MG/ML
4 INJECTION INTRAMUSCULAR; INTRAVENOUS ONCE
Status: COMPLETED | OUTPATIENT
Start: 2018-01-29 | End: 2018-01-29

## 2018-01-29 RX ORDER — SODIUM CHLORIDE, SODIUM LACTATE, POTASSIUM CHLORIDE, CALCIUM CHLORIDE 600; 310; 30; 20 MG/100ML; MG/100ML; MG/100ML; MG/100ML
9 INJECTION, SOLUTION INTRAVENOUS CONTINUOUS PRN
Status: DISCONTINUED | OUTPATIENT
Start: 2018-01-29 | End: 2018-01-29 | Stop reason: HOSPADM

## 2018-01-29 RX ORDER — ASPIRIN 81 MG/1
81 TABLET ORAL DAILY
Status: DISCONTINUED | OUTPATIENT
Start: 2018-01-30 | End: 2018-02-03 | Stop reason: HOSPADM

## 2018-01-29 RX ORDER — FENTANYL CITRATE 50 UG/ML
50 INJECTION, SOLUTION INTRAMUSCULAR; INTRAVENOUS
Status: DISCONTINUED | OUTPATIENT
Start: 2018-01-29 | End: 2018-01-29

## 2018-01-29 RX ORDER — FUROSEMIDE 20 MG/1
20 TABLET ORAL
Status: DISCONTINUED | OUTPATIENT
Start: 2018-01-29 | End: 2018-02-03 | Stop reason: HOSPADM

## 2018-01-29 RX ORDER — LIDOCAINE HYDROCHLORIDE 10 MG/ML
0.5 INJECTION, SOLUTION EPIDURAL; INFILTRATION; INTRACAUDAL; PERINEURAL ONCE AS NEEDED
Status: DISCONTINUED | OUTPATIENT
Start: 2018-01-29 | End: 2018-01-29

## 2018-01-29 RX ORDER — ONDANSETRON 2 MG/ML
4 INJECTION INTRAMUSCULAR; INTRAVENOUS ONCE AS NEEDED
Status: DISCONTINUED | OUTPATIENT
Start: 2018-01-29 | End: 2018-01-29

## 2018-01-29 RX ORDER — DESOXIMETASONE 0.5 MG/G
OINTMENT TOPICAL
COMMUNITY
End: 2018-02-03 | Stop reason: HOSPADM

## 2018-01-29 RX ORDER — LISINOPRIL 10 MG/1
10 TABLET ORAL DAILY
Status: DISCONTINUED | OUTPATIENT
Start: 2018-01-30 | End: 2018-02-03 | Stop reason: HOSPADM

## 2018-01-29 RX ADMIN — FENTANYL CITRATE 50 MCG: 50 INJECTION, SOLUTION INTRAMUSCULAR; INTRAVENOUS at 16:00

## 2018-01-29 RX ADMIN — ROSUVASTATIN CALCIUM 40 MG: 20 TABLET, FILM COATED ORAL at 22:27

## 2018-01-29 RX ADMIN — LATANOPROST 1 DROP: 50 SOLUTION OPHTHALMIC at 22:28

## 2018-01-29 RX ADMIN — DORZOLAMIDE HYDROCHLORIDE 1 DROP: 20 SOLUTION OPHTHALMIC at 22:27

## 2018-01-29 RX ADMIN — BUPROPION HYDROCHLORIDE 150 MG: 150 TABLET, EXTENDED RELEASE ORAL at 22:27

## 2018-01-29 RX ADMIN — ONDANSETRON 4 MG: 2 INJECTION INTRAMUSCULAR; INTRAVENOUS at 10:22

## 2018-01-29 RX ADMIN — LIDOCAINE HYDROCHLORIDE 1 EACH: 40 SOLUTION TOPICAL at 13:52

## 2018-01-29 RX ADMIN — GLYCOPYRROLATE 0.4 MG: 0.2 INJECTION, SOLUTION INTRAMUSCULAR; INTRAVENOUS at 14:56

## 2018-01-29 RX ADMIN — BUDESONIDE AND FORMOTEROL FUMARATE DIHYDRATE 2 PUFF: 80; 4.5 AEROSOL RESPIRATORY (INHALATION) at 20:42

## 2018-01-29 RX ADMIN — FAMOTIDINE 20 MG: 20 TABLET, FILM COATED ORAL at 13:00

## 2018-01-29 RX ADMIN — HYDROMORPHONE HYDROCHLORIDE 1 MG: 2 INJECTION INTRAMUSCULAR; INTRAVENOUS; SUBCUTANEOUS at 10:22

## 2018-01-29 RX ADMIN — PROPOFOL 100 MG: 10 INJECTION, EMULSION INTRAVENOUS at 13:50

## 2018-01-29 RX ADMIN — SODIUM CHLORIDE, POTASSIUM CHLORIDE, SODIUM LACTATE AND CALCIUM CHLORIDE 9 ML/HR: 600; 310; 30; 20 INJECTION, SOLUTION INTRAVENOUS at 13:00

## 2018-01-29 RX ADMIN — ROCURONIUM BROMIDE 30 MG: 10 SOLUTION INTRAVENOUS at 13:50

## 2018-01-29 RX ADMIN — ROPIVACAINE HYDROCHLORIDE 20 ML: 5 INJECTION, SOLUTION EPIDURAL; INFILTRATION; PERINEURAL at 13:30

## 2018-01-29 RX ADMIN — POTASSIUM CHLORIDE AND SODIUM CHLORIDE 50 ML/HR: 900; 150 INJECTION, SOLUTION INTRAVENOUS at 21:05

## 2018-01-29 RX ADMIN — LIDOCAINE HYDROCHLORIDE 50 MG: 10 INJECTION, SOLUTION EPIDURAL; INFILTRATION; INTRACAUDAL; PERINEURAL at 13:50

## 2018-01-29 RX ADMIN — EPHEDRINE SULFATE 10 MG: 50 INJECTION INTRAMUSCULAR; INTRAVENOUS; SUBCUTANEOUS at 13:57

## 2018-01-29 RX ADMIN — ROCURONIUM BROMIDE 10 MG: 10 SOLUTION INTRAVENOUS at 14:12

## 2018-01-29 RX ADMIN — CEFAZOLIN SODIUM 2 G: 2 INJECTION, SOLUTION INTRAVENOUS at 13:48

## 2018-01-29 RX ADMIN — ROPIVACAINE HYDROCHLORIDE 8 ML/HR: 2 INJECTION, SOLUTION EPIDURAL; INFILTRATION at 15:30

## 2018-01-29 RX ADMIN — NEOSTIGMINE METHYLSULFATE 3 MG: 1 INJECTION, SOLUTION INTRAVENOUS at 14:56

## 2018-01-29 RX ADMIN — SODIUM CHLORIDE, POTASSIUM CHLORIDE, SODIUM LACTATE AND CALCIUM CHLORIDE: 600; 310; 30; 20 INJECTION, SOLUTION INTRAVENOUS at 14:49

## 2018-01-29 RX ADMIN — ONDANSETRON 4 MG: 2 INJECTION INTRAMUSCULAR; INTRAVENOUS at 14:55

## 2018-01-29 RX ADMIN — CEFAZOLIN SODIUM 2 G: 2 INJECTION, SOLUTION INTRAVENOUS at 22:28

## 2018-01-29 NOTE — ANESTHESIA PROCEDURE NOTES
Peripheral Block    Patient location during procedure: pre-op  Reason for block: procedure for pain and at surgeon's request  Performed by  Anesthesiologist: EMMANUEL SCHOFIELD  CRNA: AKILA PRESSLEY  Assisted by: DE STOUT  Preanesthetic Checklist  Completed: patient identified, site marked, surgical consent, pre-op evaluation, timeout performed, IV checked, risks and benefits discussed and monitors and equipment checked  Prep:  Sterile barriers:cap, gloves and mask  Prep: ChloraPrep  Patient monitoring: blood pressure monitoring, continuous pulse oximetry and EKG  Procedure    Guidance:ultrasound guided  Images:still images obtained    Laterality:right  Block Type:fascia iliaca catheter  Injection Technique:catheter  Needle Type:echogenic  Needle Gauge:18 G    Catheter Size:20 G (20g)  Medications  Preservative Free Saline:10ml  Comment:30ml ns added.  Local Injected:ropivacaine 0.5% Local Amount Injected:20mL  Post Assessment  Injection Assessment: negative aspiration for heme, no paresthesia on injection and incremental injection  Patient Tolerance:comfortable throughout block  Complications:no  Additional Notes  Procedure:                 Pt placed in supine position.   The insertion site was prepped in sterile fashion with Chlorapreop and clear plastic drapes.  Analgesia was provided by skin infiltration at insertion site with Lidocaine 1% 3mls.  A B-Grajeda 18 g , 4 inch echogenic Touhy needle was advance In-plane under ultrasound guidance. The   Anterior superior Iliac crest was initially visualized and the probe was directed slightly medially and slightly towards the umbilicus.  The course of the needle was tracked over the sartorius muscle through the fascia Iliacus and into the anterior portion of the Iliacus muscle.  Major vessels where identified and avoided as where structures of the peritoneal cavity.  LA injection was made incrementally in 1-5ml amounts spread was visualized superiorly below fascia  iliacus.  Injection was completed with negative aspiration of blood and negative intravascular injection.  Injection pressures where normal or minimal resistance.  A 20 g B-Grajeda wire styleted catheter was then advance thru the needle and very easily placed in a superior or cephalad direction.  The catheter was secured at insertion site with skin afix , mastisol, steristreps.  A CHG tegaderm dressing was placed over the insertion site and the nerve catheter labeled and capped.  Thank You.

## 2018-01-29 NOTE — ANESTHESIA PREPROCEDURE EVALUATION
Anesthesia Evaluation     Patient summary reviewed and Nursing notes reviewed   NPO Solid Status: > 8 hours  NPO Liquid Status: N/A     Airway   Mallampati: I  TM distance: >3 FB  Neck ROM: full  no difficulty expected  Dental    (+) edentulous    Pulmonary    (+) a smoker Current,   Cardiovascular     ECG reviewed  Patient on routine beta blocker    (+) hypertension, past MI , CAD, cardiac stents () more than 12 months ago PVD (Fem pop bypass stent  ),   (-) dysrhythmias, anginaCAB cath since 2 months ago OK     ROS comment: EKG NSR bismark atrial complexes  Left anterior fascicular block    Neuro/Psych  (+) CVA (remote  [place] ),     (-) seizures, TIA  GI/Hepatic/Renal/Endo    (+)  diabetes mellitus (45 nyear hx  insulin 40 years) using insulin,   (-) liver disease, no renal disease    Musculoskeletal     (+) myalgias,   Abdominal    Substance History      OB/GYN          Other        ROS/Med Hx Other: Admitted today last PO                                                  Legally Blind                                   Anesthesia Plan    ASA 3     general and regional   (Fascia Iliaca block performed at surgeons request   Discussed with family over the phone and they agreed. Salonis CRNA/Luis CRNA  Pt is semi-coherant due to ER Opiates  Will go ahead because of Emergent nature of case where Benefit >risk and in patients best interest)  intravenous induction   Anesthetic plan and risks discussed with patient.    Plan discussed with CRNA.

## 2018-01-29 NOTE — ANESTHESIA POSTPROCEDURE EVALUATION
Patient: Martin Moctezuma    Procedure Summary     Date Anesthesia Start Anesthesia Stop Room / Location    01/29/18 1343 1519 [facility] / [facility]       Procedure Diagnosis Surgeon Provider    HIP TROCANTERIC NAILING WITH INTRAMEDULLARY HIP SCREW (Right Hip) No diagnosis on file. MD Martin Hebert MD          Anesthesia Type: general  Last vitals  BP   121/62 (01/29/18 1303)   Temp   98.1 °F (36.7 °C) (01/29/18 1303)   Pulse   66 (01/29/18 1303)   Resp   18 (01/29/18 1303)     SpO2   95 % (01/29/18 1303)     Post Anesthesia Care and Evaluation    Patient location during evaluation: PACU  Patient participation: waiting for patient participation  Level of consciousness: lethargic  Pain score: 0  Pain management: adequate  Airway patency: patent  Anesthetic complications: No anesthetic complications  PONV Status: none  Cardiovascular status: hemodynamically stable and acceptable  Respiratory status: nonlabored ventilation, acceptable and nasal cannula  Hydration status: acceptable

## 2018-01-29 NOTE — ANESTHESIA PROCEDURE NOTES
Airway  Urgency: elective    Date/Time: 1/29/2018 1:50 PM  End Time:1/29/2018 1:53 PM  Airway not difficult    General Information and Staff    Patient location during procedure: OR  CRNA: AKILA PRESSLEY    Indications and Patient Condition  Indications for airway management: airway protection    Preoxygenated: yes  MILS not maintained throughout  Mask difficulty assessment: 1 - vent by mask    Final Airway Details  Final airway type: endotracheal airway      Successful airway: ETT  Cuffed: yes   Successful intubation technique: direct laryngoscopy  Endotracheal tube insertion site: oral  Blade: Prosper  Blade size: #3  ETT size: 8.0 mm  Cormack-Lehane Classification: grade I - full view of glottis  Placement verified by: chest auscultation and capnometry   Measured from: lips  ETT to lips (cm): 23  Number of attempts at approach: 1    Additional Comments  Negative epigastric sounds, Breath sound equal bilaterally with symmetric chest rise and fall

## 2018-01-30 LAB
ANION GAP SERPL CALCULATED.3IONS-SCNC: 3 MMOL/L (ref 3–11)
BASOPHILS # BLD AUTO: 0.02 10*3/MM3 (ref 0–0.2)
BASOPHILS NFR BLD AUTO: 0.2 % (ref 0–1)
BUN BLD-MCNC: 25 MG/DL (ref 9–23)
BUN/CREAT SERPL: 19.2 (ref 7–25)
CALCIUM SPEC-SCNC: 7.6 MG/DL (ref 8.7–10.4)
CHLORIDE SERPL-SCNC: 103 MMOL/L (ref 99–109)
CO2 SERPL-SCNC: 25 MMOL/L (ref 20–31)
CREAT BLD-MCNC: 1.3 MG/DL (ref 0.6–1.3)
DEPRECATED RDW RBC AUTO: 46.2 FL (ref 37–54)
EOSINOPHIL # BLD AUTO: 0.04 10*3/MM3 (ref 0–0.3)
EOSINOPHIL NFR BLD AUTO: 0.4 % (ref 0–3)
ERYTHROCYTE [DISTWIDTH] IN BLOOD BY AUTOMATED COUNT: 14.4 % (ref 11.3–14.5)
GFR SERPL CREATININE-BSD FRML MDRD: 55 ML/MIN/1.73
GLUCOSE BLD-MCNC: 148 MG/DL (ref 70–100)
GLUCOSE BLDC GLUCOMTR-MCNC: 155 MG/DL (ref 70–130)
GLUCOSE BLDC GLUCOMTR-MCNC: 182 MG/DL (ref 70–130)
GLUCOSE BLDC GLUCOMTR-MCNC: 215 MG/DL (ref 70–130)
GLUCOSE BLDC GLUCOMTR-MCNC: 274 MG/DL (ref 70–130)
HCT VFR BLD AUTO: 26.8 % (ref 38.9–50.9)
HGB BLD-MCNC: 8.7 G/DL (ref 13.1–17.5)
IMM GRANULOCYTES # BLD: 0.02 10*3/MM3 (ref 0–0.03)
IMM GRANULOCYTES NFR BLD: 0.2 % (ref 0–0.6)
LYMPHOCYTES # BLD AUTO: 0.99 10*3/MM3 (ref 0.6–4.8)
LYMPHOCYTES NFR BLD AUTO: 10.2 % (ref 24–44)
MCH RBC QN AUTO: 28.3 PG (ref 27–31)
MCHC RBC AUTO-ENTMCNC: 32.5 G/DL (ref 32–36)
MCV RBC AUTO: 87.3 FL (ref 80–99)
MONOCYTES # BLD AUTO: 1 10*3/MM3 (ref 0–1)
MONOCYTES NFR BLD AUTO: 10.3 % (ref 0–12)
NEUTROPHILS # BLD AUTO: 7.62 10*3/MM3 (ref 1.5–8.3)
NEUTROPHILS NFR BLD AUTO: 78.7 % (ref 41–71)
PLATELET # BLD AUTO: 143 10*3/MM3 (ref 150–450)
PMV BLD AUTO: 11.9 FL (ref 6–12)
POTASSIUM BLD-SCNC: 4.7 MMOL/L (ref 3.5–5.5)
RBC # BLD AUTO: 3.07 10*6/MM3 (ref 4.2–5.76)
SODIUM BLD-SCNC: 131 MMOL/L (ref 132–146)
WBC NRBC COR # BLD: 9.69 10*3/MM3 (ref 3.5–10.8)

## 2018-01-30 PROCEDURE — 25010000002 ENOXAPARIN PER 10 MG: Performed by: ORTHOPAEDIC SURGERY

## 2018-01-30 PROCEDURE — 94799 UNLISTED PULMONARY SVC/PX: CPT

## 2018-01-30 PROCEDURE — 25010000002 ROPIVACAINE PER 1 MG: Performed by: NURSE ANESTHETIST, CERTIFIED REGISTERED

## 2018-01-30 PROCEDURE — 63710000001 INSULIN DETEMIR PER 5 UNITS: Performed by: INTERNAL MEDICINE

## 2018-01-30 PROCEDURE — 97166 OT EVAL MOD COMPLEX 45 MIN: CPT | Performed by: OCCUPATIONAL THERAPIST

## 2018-01-30 PROCEDURE — 85025 COMPLETE CBC W/AUTO DIFF WBC: CPT | Performed by: ORTHOPAEDIC SURGERY

## 2018-01-30 PROCEDURE — 99024 POSTOP FOLLOW-UP VISIT: CPT | Performed by: ORTHOPAEDIC SURGERY

## 2018-01-30 PROCEDURE — 25810000003 SODIUM CHLORIDE 0.9 % WITH KCL 20 MEQ 20-0.9 MEQ/L-% SOLUTION: Performed by: ORTHOPAEDIC SURGERY

## 2018-01-30 PROCEDURE — 99232 SBSQ HOSP IP/OBS MODERATE 35: CPT | Performed by: INTERNAL MEDICINE

## 2018-01-30 PROCEDURE — 94640 AIRWAY INHALATION TREATMENT: CPT

## 2018-01-30 PROCEDURE — 25010000003 CEFAZOLIN IN DEXTROSE 2-4 GM/100ML-% SOLUTION: Performed by: ORTHOPAEDIC SURGERY

## 2018-01-30 PROCEDURE — 82962 GLUCOSE BLOOD TEST: CPT

## 2018-01-30 PROCEDURE — 97162 PT EVAL MOD COMPLEX 30 MIN: CPT

## 2018-01-30 PROCEDURE — 80048 BASIC METABOLIC PNL TOTAL CA: CPT | Performed by: INTERNAL MEDICINE

## 2018-01-30 RX ADMIN — NICOTINE 1 PATCH: 21 PATCH, EXTENDED RELEASE TRANSDERMAL at 08:10

## 2018-01-30 RX ADMIN — CEFAZOLIN SODIUM 2 G: 2 INJECTION, SOLUTION INTRAVENOUS at 05:21

## 2018-01-30 RX ADMIN — BUDESONIDE AND FORMOTEROL FUMARATE DIHYDRATE 2 PUFF: 80; 4.5 AEROSOL RESPIRATORY (INHALATION) at 22:12

## 2018-01-30 RX ADMIN — CETIRIZINE HYDROCHLORIDE 10 MG: 10 TABLET, FILM COATED ORAL at 08:09

## 2018-01-30 RX ADMIN — LATANOPROST 1 DROP: 50 SOLUTION OPHTHALMIC at 21:29

## 2018-01-30 RX ADMIN — INSULIN LISPRO 6 UNITS: 100 INJECTION, SOLUTION INTRAVENOUS; SUBCUTANEOUS at 11:52

## 2018-01-30 RX ADMIN — FLUDROCORTISONE ACETATE 0.1 MG: 0.1 TABLET ORAL at 08:10

## 2018-01-30 RX ADMIN — BUDESONIDE AND FORMOTEROL FUMARATE DIHYDRATE 2 PUFF: 80; 4.5 AEROSOL RESPIRATORY (INHALATION) at 09:49

## 2018-01-30 RX ADMIN — PANTOPRAZOLE SODIUM 40 MG: 40 TABLET, DELAYED RELEASE ORAL at 05:21

## 2018-01-30 RX ADMIN — PENTOXIFYLLINE 400 MG: 400 TABLET, FILM COATED, EXTENDED RELEASE ORAL at 11:52

## 2018-01-30 RX ADMIN — POTASSIUM CHLORIDE AND SODIUM CHLORIDE 50 ML/HR: 900; 150 INJECTION, SOLUTION INTRAVENOUS at 11:59

## 2018-01-30 RX ADMIN — PENTOXIFYLLINE 400 MG: 400 TABLET, FILM COATED, EXTENDED RELEASE ORAL at 08:10

## 2018-01-30 RX ADMIN — ROPIVACAINE HYDROCHLORIDE 8 ML/HR: 2 INJECTION, SOLUTION EPIDURAL; INFILTRATION at 11:59

## 2018-01-30 RX ADMIN — ENOXAPARIN SODIUM 40 MG: 40 INJECTION SUBCUTANEOUS at 08:10

## 2018-01-30 RX ADMIN — INSULIN LISPRO 4 UNITS: 100 INJECTION, SOLUTION INTRAVENOUS; SUBCUTANEOUS at 21:28

## 2018-01-30 RX ADMIN — DORZOLAMIDE HYDROCHLORIDE 1 DROP: 20 SOLUTION OPHTHALMIC at 21:29

## 2018-01-30 RX ADMIN — FUROSEMIDE 20 MG: 20 TABLET ORAL at 17:29

## 2018-01-30 RX ADMIN — LISINOPRIL 10 MG: 10 TABLET ORAL at 08:09

## 2018-01-30 RX ADMIN — SERTRALINE HYDROCHLORIDE 100 MG: 100 TABLET ORAL at 08:09

## 2018-01-30 RX ADMIN — BISOPROLOL FUMARATE 5 MG: 5 TABLET, COATED ORAL at 08:09

## 2018-01-30 RX ADMIN — DORZOLAMIDE HYDROCHLORIDE 1 DROP: 20 SOLUTION OPHTHALMIC at 14:41

## 2018-01-30 RX ADMIN — POLYETHYLENE GLYCOL 3350 17 G: 17 POWDER, FOR SOLUTION ORAL at 08:10

## 2018-01-30 RX ADMIN — INSULIN LISPRO 2 UNITS: 100 INJECTION, SOLUTION INTRAVENOUS; SUBCUTANEOUS at 17:29

## 2018-01-30 RX ADMIN — HYDROCODONE BITARTRATE AND ACETAMINOPHEN 1 TABLET: 5; 325 TABLET ORAL at 08:09

## 2018-01-30 RX ADMIN — INSULIN LISPRO 2 UNITS: 100 INJECTION, SOLUTION INTRAVENOUS; SUBCUTANEOUS at 08:09

## 2018-01-30 RX ADMIN — DORZOLAMIDE HYDROCHLORIDE 1 DROP: 20 SOLUTION OPHTHALMIC at 05:21

## 2018-01-30 RX ADMIN — BUPROPION HYDROCHLORIDE 150 MG: 150 TABLET, EXTENDED RELEASE ORAL at 08:09

## 2018-01-30 RX ADMIN — FUROSEMIDE 20 MG: 20 TABLET ORAL at 08:09

## 2018-01-30 RX ADMIN — BUPROPION HYDROCHLORIDE 150 MG: 150 TABLET, EXTENDED RELEASE ORAL at 21:29

## 2018-01-30 RX ADMIN — HYDROCODONE BITARTRATE AND ACETAMINOPHEN 1 TABLET: 5; 325 TABLET ORAL at 11:52

## 2018-01-30 RX ADMIN — HYDROCODONE BITARTRATE AND ACETAMINOPHEN 1 TABLET: 5; 325 TABLET ORAL at 17:28

## 2018-01-30 RX ADMIN — PENTOXIFYLLINE 400 MG: 400 TABLET, FILM COATED, EXTENDED RELEASE ORAL at 17:29

## 2018-01-30 RX ADMIN — ASPIRIN 81 MG: 81 TABLET, COATED ORAL at 08:09

## 2018-01-30 RX ADMIN — ROSUVASTATIN CALCIUM 40 MG: 20 TABLET, FILM COATED ORAL at 21:29

## 2018-01-30 RX ADMIN — INSULIN DETEMIR 10 UNITS: 100 INJECTION, SOLUTION SUBCUTANEOUS at 08:08

## 2018-01-31 PROBLEM — R33.9 URINARY RETENTION: Status: ACTIVE | Noted: 2018-01-31

## 2018-01-31 LAB
ANION GAP SERPL CALCULATED.3IONS-SCNC: 4 MMOL/L (ref 3–11)
BUN BLD-MCNC: 26 MG/DL (ref 9–23)
BUN/CREAT SERPL: 20 (ref 7–25)
CALCIUM SPEC-SCNC: 8.2 MG/DL (ref 8.7–10.4)
CHLORIDE SERPL-SCNC: 107 MMOL/L (ref 99–109)
CO2 SERPL-SCNC: 21 MMOL/L (ref 20–31)
CREAT BLD-MCNC: 1.3 MG/DL (ref 0.6–1.3)
DEPRECATED RDW RBC AUTO: 46.6 FL (ref 37–54)
ERYTHROCYTE [DISTWIDTH] IN BLOOD BY AUTOMATED COUNT: 14.4 % (ref 11.3–14.5)
GFR SERPL CREATININE-BSD FRML MDRD: 55 ML/MIN/1.73
GLUCOSE BLD-MCNC: 202 MG/DL (ref 70–100)
GLUCOSE BLDC GLUCOMTR-MCNC: 171 MG/DL (ref 70–130)
GLUCOSE BLDC GLUCOMTR-MCNC: 209 MG/DL (ref 70–130)
GLUCOSE BLDC GLUCOMTR-MCNC: 230 MG/DL (ref 70–130)
GLUCOSE BLDC GLUCOMTR-MCNC: 254 MG/DL (ref 70–130)
GLUCOSE BLDC GLUCOMTR-MCNC: 342 MG/DL (ref 70–130)
HCT VFR BLD AUTO: 26.3 % (ref 38.9–50.9)
HGB BLD-MCNC: 8.4 G/DL (ref 13.1–17.5)
MCH RBC QN AUTO: 28.4 PG (ref 27–31)
MCHC RBC AUTO-ENTMCNC: 31.9 G/DL (ref 32–36)
MCV RBC AUTO: 88.9 FL (ref 80–99)
PLATELET # BLD AUTO: 145 10*3/MM3 (ref 150–450)
PMV BLD AUTO: 12.1 FL (ref 6–12)
POTASSIUM BLD-SCNC: 5.1 MMOL/L (ref 3.5–5.5)
RBC # BLD AUTO: 2.96 10*6/MM3 (ref 4.2–5.76)
SODIUM BLD-SCNC: 132 MMOL/L (ref 132–146)
WBC NRBC COR # BLD: 10 10*3/MM3 (ref 3.5–10.8)

## 2018-01-31 PROCEDURE — 94799 UNLISTED PULMONARY SVC/PX: CPT

## 2018-01-31 PROCEDURE — 25010000002 ROPIVACAINE PER 1 MG: Performed by: NURSE ANESTHETIST, CERTIFIED REGISTERED

## 2018-01-31 PROCEDURE — 94640 AIRWAY INHALATION TREATMENT: CPT

## 2018-01-31 PROCEDURE — 85027 COMPLETE CBC AUTOMATED: CPT | Performed by: INTERNAL MEDICINE

## 2018-01-31 PROCEDURE — 25010000002 ENOXAPARIN PER 10 MG: Performed by: ORTHOPAEDIC SURGERY

## 2018-01-31 PROCEDURE — 99232 SBSQ HOSP IP/OBS MODERATE 35: CPT | Performed by: PHYSICIAN ASSISTANT

## 2018-01-31 PROCEDURE — 80048 BASIC METABOLIC PNL TOTAL CA: CPT | Performed by: INTERNAL MEDICINE

## 2018-01-31 PROCEDURE — P9612 CATHETERIZE FOR URINE SPEC: HCPCS

## 2018-01-31 PROCEDURE — 99024 POSTOP FOLLOW-UP VISIT: CPT | Performed by: ORTHOPAEDIC SURGERY

## 2018-01-31 PROCEDURE — 63710000001 INSULIN DETEMIR PER 5 UNITS: Performed by: INTERNAL MEDICINE

## 2018-01-31 PROCEDURE — 97110 THERAPEUTIC EXERCISES: CPT

## 2018-01-31 PROCEDURE — 82962 GLUCOSE BLOOD TEST: CPT

## 2018-01-31 PROCEDURE — 97530 THERAPEUTIC ACTIVITIES: CPT | Performed by: OCCUPATIONAL THERAPIST

## 2018-01-31 PROCEDURE — 94760 N-INVAS EAR/PLS OXIMETRY 1: CPT

## 2018-01-31 RX ORDER — SENNA AND DOCUSATE SODIUM 50; 8.6 MG/1; MG/1
2 TABLET, FILM COATED ORAL 2 TIMES DAILY
Status: DISCONTINUED | OUTPATIENT
Start: 2018-01-31 | End: 2018-02-01

## 2018-01-31 RX ORDER — TAMSULOSIN HYDROCHLORIDE 0.4 MG/1
0.4 CAPSULE ORAL DAILY
Status: DISCONTINUED | OUTPATIENT
Start: 2018-01-31 | End: 2018-02-03 | Stop reason: HOSPADM

## 2018-01-31 RX ADMIN — TAMSULOSIN HYDROCHLORIDE 0.4 MG: 0.4 CAPSULE ORAL at 17:22

## 2018-01-31 RX ADMIN — INSULIN LISPRO 7 UNITS: 100 INJECTION, SOLUTION INTRAVENOUS; SUBCUTANEOUS at 12:10

## 2018-01-31 RX ADMIN — PENTOXIFYLLINE 400 MG: 400 TABLET, FILM COATED, EXTENDED RELEASE ORAL at 17:23

## 2018-01-31 RX ADMIN — HYDROCODONE BITARTRATE AND ACETAMINOPHEN 1 TABLET: 5; 325 TABLET ORAL at 12:11

## 2018-01-31 RX ADMIN — SERTRALINE HYDROCHLORIDE 100 MG: 100 TABLET ORAL at 09:20

## 2018-01-31 RX ADMIN — BUDESONIDE AND FORMOTEROL FUMARATE DIHYDRATE 2 PUFF: 80; 4.5 AEROSOL RESPIRATORY (INHALATION) at 08:26

## 2018-01-31 RX ADMIN — DOCUSATE SODIUM 100 MG: 100 CAPSULE, LIQUID FILLED ORAL at 15:45

## 2018-01-31 RX ADMIN — ASPIRIN 81 MG: 81 TABLET, COATED ORAL at 09:20

## 2018-01-31 RX ADMIN — PANTOPRAZOLE SODIUM 40 MG: 40 TABLET, DELAYED RELEASE ORAL at 05:03

## 2018-01-31 RX ADMIN — BUPROPION HYDROCHLORIDE 150 MG: 150 TABLET, EXTENDED RELEASE ORAL at 09:21

## 2018-01-31 RX ADMIN — PENTOXIFYLLINE 400 MG: 400 TABLET, FILM COATED, EXTENDED RELEASE ORAL at 09:21

## 2018-01-31 RX ADMIN — BISOPROLOL FUMARATE 5 MG: 5 TABLET, COATED ORAL at 09:21

## 2018-01-31 RX ADMIN — ENOXAPARIN SODIUM 40 MG: 40 INJECTION SUBCUTANEOUS at 09:20

## 2018-01-31 RX ADMIN — LATANOPROST 1 DROP: 50 SOLUTION OPHTHALMIC at 20:29

## 2018-01-31 RX ADMIN — ROSUVASTATIN CALCIUM 40 MG: 20 TABLET, FILM COATED ORAL at 20:30

## 2018-01-31 RX ADMIN — INSULIN DETEMIR 10 UNITS: 100 INJECTION, SOLUTION SUBCUTANEOUS at 09:33

## 2018-01-31 RX ADMIN — BUPROPION HYDROCHLORIDE 150 MG: 150 TABLET, EXTENDED RELEASE ORAL at 20:29

## 2018-01-31 RX ADMIN — POLYETHYLENE GLYCOL 3350 17 G: 17 POWDER, FOR SOLUTION ORAL at 09:20

## 2018-01-31 RX ADMIN — CETIRIZINE HYDROCHLORIDE 10 MG: 10 TABLET, FILM COATED ORAL at 09:20

## 2018-01-31 RX ADMIN — PENTOXIFYLLINE 400 MG: 400 TABLET, FILM COATED, EXTENDED RELEASE ORAL at 12:11

## 2018-01-31 RX ADMIN — INSULIN LISPRO 4 UNITS: 100 INJECTION, SOLUTION INTRAVENOUS; SUBCUTANEOUS at 17:23

## 2018-01-31 RX ADMIN — DORZOLAMIDE HYDROCHLORIDE 1 DROP: 20 SOLUTION OPHTHALMIC at 15:40

## 2018-01-31 RX ADMIN — DORZOLAMIDE HYDROCHLORIDE 1 DROP: 20 SOLUTION OPHTHALMIC at 05:03

## 2018-01-31 RX ADMIN — INSULIN LISPRO 4 UNITS: 100 INJECTION, SOLUTION INTRAVENOUS; SUBCUTANEOUS at 09:32

## 2018-01-31 RX ADMIN — BUDESONIDE AND FORMOTEROL FUMARATE DIHYDRATE 2 PUFF: 80; 4.5 AEROSOL RESPIRATORY (INHALATION) at 21:29

## 2018-01-31 RX ADMIN — Medication 2 TABLET: at 20:29

## 2018-01-31 RX ADMIN — HYDROCODONE BITARTRATE AND ACETAMINOPHEN 1 TABLET: 5; 325 TABLET ORAL at 20:29

## 2018-01-31 RX ADMIN — INSULIN LISPRO 2 UNITS: 100 INJECTION, SOLUTION INTRAVENOUS; SUBCUTANEOUS at 20:47

## 2018-01-31 RX ADMIN — LISINOPRIL 10 MG: 10 TABLET ORAL at 09:19

## 2018-01-31 RX ADMIN — ROPIVACAINE HYDROCHLORIDE 8 ML/HR: 2 INJECTION, SOLUTION EPIDURAL; INFILTRATION at 10:51

## 2018-01-31 RX ADMIN — MILK OF MAGNESIA 10 ML: 2400 CONCENTRATE ORAL at 15:45

## 2018-01-31 RX ADMIN — HYDROCODONE BITARTRATE AND ACETAMINOPHEN 1 TABLET: 5; 325 TABLET ORAL at 03:58

## 2018-01-31 RX ADMIN — FLUDROCORTISONE ACETATE 0.1 MG: 0.1 TABLET ORAL at 09:21

## 2018-01-31 RX ADMIN — FUROSEMIDE 20 MG: 20 TABLET ORAL at 17:22

## 2018-01-31 RX ADMIN — NICOTINE 1 PATCH: 21 PATCH, EXTENDED RELEASE TRANSDERMAL at 09:20

## 2018-01-31 RX ADMIN — FUROSEMIDE 20 MG: 20 TABLET ORAL at 09:19

## 2018-02-01 LAB
ANION GAP SERPL CALCULATED.3IONS-SCNC: 8 MMOL/L (ref 3–11)
BUN BLD-MCNC: 26 MG/DL (ref 9–23)
BUN/CREAT SERPL: 18.6 (ref 7–25)
CALCIUM SPEC-SCNC: 8 MG/DL (ref 8.7–10.4)
CHLORIDE SERPL-SCNC: 100 MMOL/L (ref 99–109)
CO2 SERPL-SCNC: 20 MMOL/L (ref 20–31)
CREAT BLD-MCNC: 1.4 MG/DL (ref 0.6–1.3)
GFR SERPL CREATININE-BSD FRML MDRD: 50 ML/MIN/1.73
GLUCOSE BLD-MCNC: 310 MG/DL (ref 70–100)
GLUCOSE BLDC GLUCOMTR-MCNC: 191 MG/DL (ref 70–130)
GLUCOSE BLDC GLUCOMTR-MCNC: 226 MG/DL (ref 70–130)
GLUCOSE BLDC GLUCOMTR-MCNC: 229 MG/DL (ref 70–130)
GLUCOSE BLDC GLUCOMTR-MCNC: 304 MG/DL (ref 70–130)
GLUCOSE BLDC GLUCOMTR-MCNC: 379 MG/DL (ref 70–130)
HCT VFR BLD AUTO: 24.5 % (ref 38.9–50.9)
HGB BLD-MCNC: 8 G/DL (ref 13.1–17.5)
OSMOLALITY SERPL: 293 MOSM/KG (ref 275–295)
POTASSIUM BLD-SCNC: 4.9 MMOL/L (ref 3.5–5.5)
SODIUM BLD-SCNC: 128 MMOL/L (ref 132–146)

## 2018-02-01 PROCEDURE — 97116 GAIT TRAINING THERAPY: CPT

## 2018-02-01 PROCEDURE — 25010000002 ENOXAPARIN PER 10 MG: Performed by: ORTHOPAEDIC SURGERY

## 2018-02-01 PROCEDURE — 94799 UNLISTED PULMONARY SVC/PX: CPT

## 2018-02-01 PROCEDURE — 94760 N-INVAS EAR/PLS OXIMETRY 1: CPT

## 2018-02-01 PROCEDURE — 63710000001 INSULIN DETEMIR PER 5 UNITS: Performed by: PHYSICIAN ASSISTANT

## 2018-02-01 PROCEDURE — 82962 GLUCOSE BLOOD TEST: CPT

## 2018-02-01 PROCEDURE — 99024 POSTOP FOLLOW-UP VISIT: CPT | Performed by: ORTHOPAEDIC SURGERY

## 2018-02-01 PROCEDURE — 97110 THERAPEUTIC EXERCISES: CPT

## 2018-02-01 PROCEDURE — 83550 IRON BINDING TEST: CPT | Performed by: PHYSICIAN ASSISTANT

## 2018-02-01 PROCEDURE — 82746 ASSAY OF FOLIC ACID SERUM: CPT | Performed by: PHYSICIAN ASSISTANT

## 2018-02-01 PROCEDURE — P9612 CATHETERIZE FOR URINE SPEC: HCPCS

## 2018-02-01 PROCEDURE — 85018 HEMOGLOBIN: CPT | Performed by: PHYSICIAN ASSISTANT

## 2018-02-01 PROCEDURE — 94640 AIRWAY INHALATION TREATMENT: CPT

## 2018-02-01 PROCEDURE — 80048 BASIC METABOLIC PNL TOTAL CA: CPT | Performed by: PHYSICIAN ASSISTANT

## 2018-02-01 PROCEDURE — 25010000002 ROPIVACAINE PER 1 MG: Performed by: NURSE ANESTHETIST, CERTIFIED REGISTERED

## 2018-02-01 PROCEDURE — 85014 HEMATOCRIT: CPT | Performed by: PHYSICIAN ASSISTANT

## 2018-02-01 PROCEDURE — 82728 ASSAY OF FERRITIN: CPT | Performed by: PHYSICIAN ASSISTANT

## 2018-02-01 PROCEDURE — 82607 VITAMIN B-12: CPT | Performed by: PHYSICIAN ASSISTANT

## 2018-02-01 PROCEDURE — 83930 ASSAY OF BLOOD OSMOLALITY: CPT | Performed by: PHYSICIAN ASSISTANT

## 2018-02-01 PROCEDURE — 99232 SBSQ HOSP IP/OBS MODERATE 35: CPT | Performed by: PHYSICIAN ASSISTANT

## 2018-02-01 PROCEDURE — 83540 ASSAY OF IRON: CPT | Performed by: PHYSICIAN ASSISTANT

## 2018-02-01 RX ORDER — SENNA AND DOCUSATE SODIUM 50; 8.6 MG/1; MG/1
2 TABLET, FILM COATED ORAL 2 TIMES DAILY PRN
Status: DISCONTINUED | OUTPATIENT
Start: 2018-02-01 | End: 2018-02-03 | Stop reason: HOSPADM

## 2018-02-01 RX ADMIN — ASPIRIN 81 MG: 81 TABLET, COATED ORAL at 09:45

## 2018-02-01 RX ADMIN — BISACODYL 10 MG: 10 SUPPOSITORY RECTAL at 05:03

## 2018-02-01 RX ADMIN — BISOPROLOL FUMARATE 5 MG: 5 TABLET, COATED ORAL at 17:55

## 2018-02-01 RX ADMIN — DORZOLAMIDE HYDROCHLORIDE 1 DROP: 20 SOLUTION OPHTHALMIC at 14:48

## 2018-02-01 RX ADMIN — DORZOLAMIDE HYDROCHLORIDE 1 DROP: 20 SOLUTION OPHTHALMIC at 21:01

## 2018-02-01 RX ADMIN — PANTOPRAZOLE SODIUM 40 MG: 40 TABLET, DELAYED RELEASE ORAL at 05:04

## 2018-02-01 RX ADMIN — CETIRIZINE HYDROCHLORIDE 10 MG: 10 TABLET, FILM COATED ORAL at 09:45

## 2018-02-01 RX ADMIN — NICOTINE 1 PATCH: 21 PATCH, EXTENDED RELEASE TRANSDERMAL at 09:46

## 2018-02-01 RX ADMIN — LISINOPRIL 10 MG: 10 TABLET ORAL at 17:55

## 2018-02-01 RX ADMIN — BUDESONIDE AND FORMOTEROL FUMARATE DIHYDRATE 2 PUFF: 80; 4.5 AEROSOL RESPIRATORY (INHALATION) at 19:07

## 2018-02-01 RX ADMIN — FUROSEMIDE 20 MG: 20 TABLET ORAL at 17:55

## 2018-02-01 RX ADMIN — BUPROPION HYDROCHLORIDE 150 MG: 150 TABLET, EXTENDED RELEASE ORAL at 09:44

## 2018-02-01 RX ADMIN — ROSUVASTATIN CALCIUM 40 MG: 20 TABLET, FILM COATED ORAL at 20:56

## 2018-02-01 RX ADMIN — DORZOLAMIDE HYDROCHLORIDE 1 DROP: 20 SOLUTION OPHTHALMIC at 05:04

## 2018-02-01 RX ADMIN — ROPIVACAINE HYDROCHLORIDE 8 ML/HR: 2 INJECTION, SOLUTION EPIDURAL; INFILTRATION at 09:44

## 2018-02-01 RX ADMIN — INSULIN DETEMIR 10 UNITS: 100 INJECTION, SOLUTION SUBCUTANEOUS at 12:05

## 2018-02-01 RX ADMIN — HYDROCODONE BITARTRATE AND ACETAMINOPHEN 1 TABLET: 5; 325 TABLET ORAL at 20:44

## 2018-02-01 RX ADMIN — FLUDROCORTISONE ACETATE 0.1 MG: 0.1 TABLET ORAL at 09:45

## 2018-02-01 RX ADMIN — BUDESONIDE AND FORMOTEROL FUMARATE DIHYDRATE 2 PUFF: 80; 4.5 AEROSOL RESPIRATORY (INHALATION) at 08:19

## 2018-02-01 RX ADMIN — PENTOXIFYLLINE 400 MG: 400 TABLET, FILM COATED, EXTENDED RELEASE ORAL at 17:55

## 2018-02-01 RX ADMIN — SERTRALINE HYDROCHLORIDE 100 MG: 100 TABLET ORAL at 09:45

## 2018-02-01 RX ADMIN — LATANOPROST 1 DROP: 50 SOLUTION OPHTHALMIC at 20:56

## 2018-02-01 RX ADMIN — TAMSULOSIN HYDROCHLORIDE 0.4 MG: 0.4 CAPSULE ORAL at 09:45

## 2018-02-01 RX ADMIN — INSULIN DETEMIR 6 UNITS: 100 INJECTION, SOLUTION SUBCUTANEOUS at 20:44

## 2018-02-01 RX ADMIN — INSULIN LISPRO 8 UNITS: 100 INJECTION, SOLUTION INTRAVENOUS; SUBCUTANEOUS at 08:04

## 2018-02-01 RX ADMIN — HYDROCODONE BITARTRATE AND ACETAMINOPHEN 1 TABLET: 5; 325 TABLET ORAL at 05:03

## 2018-02-01 RX ADMIN — INSULIN LISPRO 4 UNITS: 100 INJECTION, SOLUTION INTRAVENOUS; SUBCUTANEOUS at 20:44

## 2018-02-01 RX ADMIN — PENTOXIFYLLINE 400 MG: 400 TABLET, FILM COATED, EXTENDED RELEASE ORAL at 09:45

## 2018-02-01 RX ADMIN — BUPROPION HYDROCHLORIDE 150 MG: 150 TABLET, EXTENDED RELEASE ORAL at 20:43

## 2018-02-01 NOTE — PROGRESS NOTES
[facility] Medicine Services  PROGRESS NOTE    Patient Name: Martin Moctezuma  : 1945  MRN: [id number]    Date of Admission: 2018  Length of Stay: 3  Primary Care Physician: Provider Not In System    Subjective   Subjective     CC: F/U leg pain    HPI:    Resting in bed, no family present. Pt irritated today. Dislikes I/O cath, refusing some medications including changes to insulin as well as lovenox. Pt is adamant will not receive anticoagulation despite risk of VTE//Stroke/morbidity/mortality d/t concern for retinal hemorrhage and complete loss of vision. not willing to consider consultation with his opthalmologist.     Review of Systems  Gen- No fevers, chills  CV- No chest pain, palpitations  Resp- No cough, dyspnea  GI-constipation. No N/V/D, abd pain  - urine retention  Msk- RLE pain with ambulation    Otherwise ROS is negative except as mentioned in the HPI.    Objective   Objective     Vital Signs:   Temp:  [97.4 °F (36.3 °C)-98.1 °F (36.7 °C)] 97.4 °F (36.3 °C)  Heart Rate:  [68-72] 72  Resp:  [16-18] 17  BP: ()/(44-60) 90/44        Physical Exam:  Constitutional: No acute distress, awake and alert, resting in bed  HENT: NCAT, mucous membranes moist  Respiratory: Clear to auscultation bilaterally, respiratory effort normal   Cardiovascular: RRR, no murmurs, rubs, or gallops  Gastrointestinal: Positive bowel sounds, soft, nontender, nondistended  Musculoskeletal: No bilateral ankle edema  Psychiatric: irritable, argumentive but  Cooperative with exam  Neurologic: vision impaired.Cranial Nerves grossly intact to confrontation, speech clear  Skin: right lateral hip/thigh incision covered with aquacel C/D/I no surrounding erythema. No rashes    Results Reviewed:  I have personally reviewed current lab, radiology, and data and agree.      Results from last 7 days  Lab Units 18  0530 18  0518 18  0625 18  1021   WBC 10*3/mm3  --  10.00 9.69 9.61    HEMOGLOBIN g/dL 8.0* 8.4* 8.7* 9.9*   HEMATOCRIT % 24.5* 26.3* 26.8* 29.7*   PLATELETS 10*3/mm3  --  145* 143* 160   INR   --   --   --  0.98       Results from last 7 days  Lab Units 02/01/18  0530 01/31/18  0518 01/30/18  0625   SODIUM mmol/L 128* 132 131*   POTASSIUM mmol/L 4.9 5.1 4.7   CHLORIDE mmol/L 100 107 103   CO2 mmol/L 20.0 21.0 25.0   BUN mg/dL 26* 26* 25*   CREATININE mg/dL 1.40* 1.30 1.30   GLUCOSE mg/dL 310* 202* 148*   CALCIUM mg/dL 8.0* 8.2* 7.6*     Estimated Creatinine Clearance: 45.1 mL/min (by C-G formula based on Cr of 1.4).  No results found for: BNP  No results found for: PHART    Microbiology Results Abnormal     None          Imaging Results (last 24 hours)     ** No results found for the last 24 hours. **        Results for orders placed during the hospital encounter of 10/20/16   Adult transthoracic echo complete    Narrative · Left ventricular function is normal. Estimated EF = 60%.  · Left ventricular diastolic dysfunction (grade I) consistent with   impaired relaxation.  · Mild mitral valve regurgitation is present  · Mild tricuspid valve regurgitation is present.  · Probably bicuspid aortic valve without significant aortic stenosis,   trace aortic regurgitation          I have reviewed the medications.    Assessment/Plan   Assessment / Plan     Hospital Problem List     * (Principal)Closed fracture of right hip    Coronary artery disease    Diabetes mellitus    Hypertension    Anemia    Tobacco dependence    Urinary retention             Brief Hospital Course to date:  Martin Moctezuma is a 72 y.o. male with CAD, DM2, HTN, anemia who presented after a mechanical fall at home and found to have closed right intertrochanteric femur fracture.      Assessment & Plan:  Right hip fracture  -S/P repair by Dr. Woody 1/29  -Today patient is adamantly refusing DVT PPx. Concerned with risk of retinal hemorrhage and worsening vision. Offered to contact pt's Opthalmologic at , stated would  refuse regarding. Understands risk of DVT/VTE/Stroke and for now refusing any anticoagulation.  -PT/OT-touchdown weightbearing RLE x 6 weeks except WBAT for transfers  -Pain control with PO and IV medications as needed       K+ improved with discontinuation of IVF with KCL  -monitor on lisinopril     Na+ stable, serum osmo wnl.    DM2  -HbA1c pending  -Levemir 10 units Qam (he is on Lantus 10 units Qam and 6 units Qpm at home). Will resume home regimen to help with adherence, refused other adjustment am levemir.  5units lispro qac  -SSI     Anemia  -Chronic; slight drop post-surgically but minimal. Monitor trend     HTN  CAD  -Resume antihypertensives tomorrow-lisinopril, lasix (BP meds held this a.m.2nd to low BP). He is on Fludrocortisone daily likely for orthostatic hypotension. Monitor BP  -Continue bisoprolol (adding holding parameters)  -Resume ASA  -Continue crestor     Tobacco dependence  -NRT  -Counseled on cessation    Urinary retention  -added flomax  -continue bladder scan and I/O cath prn, tx constipation    Constipation  -aggressive bowel rx. Constipation resolved.    DVT Prophylaxis:  Refusing Lovenox. CORNELIUS/SCD as pt allows    CODE STATUS: Conditional Code    Disposition: I expect the patient to be discharged pending rehab.    Casie M Mayne, PA-C  02/01/18  2:11 PM

## 2018-02-01 NOTE — PROGRESS NOTES
[place]    Acute pain service Inpatient Progress Note    Patient Name: Martin Moctezuma  :  1945  MRN:  [id number]        Acute Pain  Service Inpatient Progress Note:    Analgesia:Good  Pain Score:2/10  LOC: alert and awake  Cardiac: VS stable  Side Effects:None  Catheter Site:clean and dressing intact  Cath type: Epidural cath(MOOG pump)  Catheter Plan:Catheter to remain Insitu and Continue catheter infusion rate unchanged  Comments: No complaints, doing well

## 2018-02-01 NOTE — THERAPY TREATMENT NOTE
Acute Care - Physical Therapy Treatment Note  [facility]     Patient Name: Martin Moctezuma  : 1945  MRN: [id number]  Today's Date: 2018  Onset of Illness/Injury or Date of Surgery Date: 18  Date of Referral to PT: 18  Referring Physician: MD Bong    Admit Date: 2018    Visit Dx:    ICD-10-CM ICD-9-CM   1. Closed fracture of right hip, initial encounter S72.001A 820.8   2. Closed displaced intertrochanteric fracture of right femur, initial encounter S72.141A 820.21   3. Impaired functional mobility, balance, gait, and endurance Z74.09 V49.89   4. Impaired mobility and ADLs Z74.09 799.89     Patient Active Problem List   Diagnosis   • Ataxia   • Coronary artery disease   • Diabetes mellitus   • Hypertension   • Diabetic retinopathy   • Legally blind   • Anemia   • Closed fracture of right hip   • Tobacco dependence   • Urinary retention               Adult Rehabilitation Note       18 1105 18 0829 18 0807    Rehab Assessment/Intervention    Discipline physical therapist  -BD occupational therapist  -ST physical therapist  -LM    Document Type therapy note (daily note)  -BD therapy note (daily note)  -ST therapy note (daily note)  -LM    Subjective Information agree to therapy;complains of;pain  -BD no complaints;agree to therapy  -ST agree to therapy;complains of;pain  -LM    Patient Effort, Rehab Treatment good  -BD good  -ST good  -LM    Symptoms Noted During/After Treatment fatigue  -BD fatigue  -ST fatigue  -LM    Precautions/Limitations brace on when up;fall precautions;other (see comments)   insensate limb, WB status= PWB/ mobility/WBAT transfers only  -BD brace on when up;fall precautions;other (see comments)   KI/FICB; pt blind   -ST fall precautions;brace on when up;other (see comments)   legally blind, KI on when up, see WB status  -LM    Precautions/Limitations, Vision vision impairment, bilaterally  -BD vision impairment, bilaterally;other (see comments)    pt blind; prefers lights on in room to aid with shadows  -ST vision impairment, bilaterally;other (see comments)   Keep lights on after treatment as pt does not like being in   -LM    Specific Treatment Considerations  Verbal confirmation per Dr. Woody at 1345 on 1/31/18- pt to be WBAT at all times, transfers and ambulation.   -ST Verbal confirmation per Dr. Woody at 1345 on 1/31/18- pt to be WBAT at all times, transfers and ambulation.   -LM    Recorded by [BD] Swapna Morse, PT [ST] Jeana Peralta, OTR [LM] Adriana Wilson, PT    Pain Assessment    Pain Assessment 0-10  -BD 0-10  -ST 0-10  -LM    Pain Score 3  -BD 3  -ST 3  -LM    Post Pain Score 3  -BD 3  -ST 3  -LM    Pain Type Acute pain  -BD Acute pain  -ST Acute pain  -LM    Pain Location Hip  -BD Hip  -ST Hip  -LM    Pain Orientation Right  -BD Right  -ST Right  -LM    Pain Intervention(s) Repositioned;Ambulation/increased activity  -BD Repositioned;Ambulation/increased activity  -ST Repositioned;Ambulation/increased activity  -LM    Response to Interventions tolerated  -BD      Recorded by [BD] Swapna Morse, PT [ST] Jeana Peralta, OTR [LM] Adriana Wilson, PT    Vision Assessment/Intervention    Visual Impairment visual impairment, bilaterally  -BD  --   legally blind  -LM    Visual Acuity Comment pt is legally blind  -BD pt is legally blind  -ST     Recorded by [BD] Swapna Morse, PT [ST] Jeana Peralta, OTR [LM] Adriana Wilson PT    Cognitive Assessment/Intervention    Current Cognitive/Communication Assessment functional  -BD functional  -ST functional  -LM    Orientation Status oriented x 4  -BD oriented x 4  -ST oriented x 4  -LM    Follows Commands/Answers Questions 100% of the time;able to follow single-step instructions;needs increased time  -% of the time;able to follow single-step instructions;needs cueing  -% of the time;able to follow single-step instructions;needs cueing  -LM    Personal Safety  WNL/WFL  -BD WNL/WFL  -ST WNL/WFL  -LM    Personal Safety Interventions fall prevention program maintained;gait belt;nonskid shoes/slippers when out of bed  -BD fall prevention program maintained;gait belt;nonskid shoes/slippers when out of bed  -ST     Recorded by [BD] Swapna Morse, PT [ST] Jeana Peralta, OTR [LM] Adriana Wilson PT    MMT (Manual Muscle Testing)    General MMT Assessment Detail   Pt demo improved quad set and SAQ but continues to demo poor control  -LM    Recorded by   [LM] Adriana Wilson PT    Mobility Assessment/Training    Extremity Weight-Bearing Status right lower extremity  -BD right lower extremity  -ST right lower extremity  -LM    Right Lower Extremity Weight-Bearing weight-bearing as tolerated  -BD weight-bearing as tolerated  -ST touch down weight-bearing   WBAT for transfers only; TDWB at all other times  -LM    Recorded by [BD] Swapna Morse, PT [ST] Jeana Peralta, OTR [LM] Adriana Wilson, PT    Bed Mobility, Assessment/Treatment    Bed Mobility, Assistive Device head of bed elevated  -BD  head of bed elevated  -LM    Bed Mobility, Scoot/Bridge, [place]   supervision required;verbal cues required  -LM    Bed Mob, Supine to Sit, [place] minimum assist (75% patient effort);verbal cues required  -BD  minimum assist (75% patient effort);verbal cues required  -LM    Bed Mob, Sit to Supine, [place]   not tested   Pt left UIC  -LM    Bed Mobility, Safety Issues   decreased use of legs for bridging/pushing  -LM    Bed Mobility, Impairments   ROM decreased;strength decreased;impaired vision;pain  -LM    Bed Mobility, Comment  EOB upon arrival with PT   -ST Donned KI prior to OOB mobility due to poor but improved quad control. Verbal cues for sequencing. Sid[place] required to bring right LE off EOB.  -LM    Recorded by [BD] Swapna Morse, PT [ST] Jeana Peralta, OTR [LM] Adriana Wilson PT    Transfer Assessment/Treatment    Transfers, Bed-Chair  [place] moderate assist (50% patient effort);2 person assist required  -BD moderate assist (50% patient effort);2 person assist required;verbal cues required  -ST moderate assist (50% patient effort);2 person assist required;verbal cues required  -LM    Transfers, Bed-Chair-Bed, Assist Device rolling walker  -BD rolling walker  -ST rolling walker  -LM    Transfers, Sit-Stand [place] minimum assist (75% patient effort);2 person assist required  -BD moderate assist (50% patient effort);2 person assist required;verbal cues required  -ST moderate assist (50% patient effort);2 person assist required;verbal cues required  -LM    Transfers, Stand-Sit [place] minimum assist (75% patient effort);2 person assist required  -BD moderate assist (50% patient effort);2 person assist required;verbal cues required  -ST moderate assist (50% patient effort);2 person assist required;verbal cues required  -LM    Transfers, Sit-Stand-Sit, Assist Device rolling walker  -BD rolling walker  -ST rolling walker  -LM    Transfer, Maintain Weight Bearing Status able to maintain weight bearing status  -BD  able to maintain weight bearing status   WBAT transfer only  -LM    Transfer, Safety Issues sequencing ability decreased;step length decreased;weight-shifting ability decreased  -BD sequencing ability decreased;step length decreased;weight-shifting ability decreased  -ST sequencing ability decreased;step length decreased;weight-shifting ability decreased  -LM    Transfer, Impairments ROM decreased;strength decreased  -BD ROM decreased;strength decreased;impaired vision;pain  -ST ROM decreased;strength decreased;impaired vision;pain  -LM    Transfer, Comment Pt needs increased time to move but wants to try transfers incependently with AD  -BD pt requires increased time to complete sit/stand d/t complexity of visual deficit-[place] for aiding with giving commands; cuing for hand placement, scooting to EOB and keeping sx LE forward  d/t KI   -ST Increased time for cueing and verbalizing sequencing. Cues for proper hand placement throughout and Pamela to step out right LE prior to t/f. Pt exhibited tremoring of muscles in right thigh after transfer from bed-chair.  -LM    Recorded by [BD] Swapna Morse, PT [ST] Jeana Peralta, OTR [LM] Adriana Wilson PT    Gait Assessment/Treatment    Gait, [place] Level other (see comments)   Pt transferrred to chair in room approximately 6 steps.  -BD  not appropriate to assess  -LM    Gait, Assistive Device rolling walker  -BD      Gait, Distance (Feet) 6  -BD      Gait, Gait Pattern Analysis swing-to gait   toe touch wb R LE, and WBAT with TTWB  -BD      Gait, Comment Pt is strong enough to use UE's to decrease WB on R LE  -BD      Recorded by [BD] Swapna Morse, PT  [LM] Adriana Wilson, PT    Functional Mobility    Functional Mobility- Comment  unable to progress steps d/t weakness and pain; PT completed bed mobility and ther-ex priior to OT arrival; d/t complexity of pt, pt requiring 2 skilled hands to assist and complete session together for safety; after PT left room, OT followed up with AE training and LBD education   -ST     Recorded by  [ST] Jeana Peralta, PRIYAR     Lower Body Dressing Assessment/Training    LB Dressing Assess/Train, Comment  pt unable to tolerate trunk flexion to attempt completion of LBD tis date d/t pain; issued leg  however for aid with positioning LE for comfort; once instructed on use, pt able to demonstrate use   -ST     Recorded by  [ST] Jeana Peralta, OTR     Motor Skills/Interventions    Additional Documentation   Balance Skills Training (Group)  -LM    Recorded by   [LM] Adriana Wilson PT    Balance Skills Training    Sitting-Level of Assistance   Close supervision  -LM    Sitting-Balance Support   Right upper extremity supported;Left upper extremity supported;Feet supported  -LM    Sitting # of Minutes   10  -LM    Standing-Level of Assistance    Minimum assistance;x2  -LM    Static Standing Balance Support   assistive device  -LM    Recorded by   [LM] Adriana Wilson PT    Therapy Exercises    Bilateral Lower Extremities AROM:;supine;ankle pumps/circles  -BD      Exercise Protocols   hip ORIF   IM hip screw  -LM    Hip ORIF Exercises   right:;10 reps;completed protocol;with assist;ankle pumps/circles;quad set;glut set;hip abduction;SAQ;LAQ;hip flexion;SLR   cues for technique; Pamela SLR, heel slide, LAQ  -LM    Recorded by [BD] Swapna Morse, PT  [LM] Adriana Wilson PT    Positioning and Restraints    Pre-Treatment Position in bed  -BD in bed  -ST in bed  -LM    Post Treatment Position chair  -BD chair  -ST chair  -LM    In Chair reclined;call light within reach;encouraged to call for assist;exit alarm on;legs elevated;RLE elevated  -BD notified nsg;reclined;call light within reach;encouraged to call for assist;exit alarm on;on mechanical lift sling  -ST notified nsg;reclined;sitting;call light within reach;encouraged to call for assist;exit alarm on;legs elevated;with OT;on mechanical lift sling  -LM    Recorded by [BD] Swapna Morse, PT [ST] Jeana Peralta, OTR [LM] Adriana Wilson, PT      01/30/18 0759          Rehab Assessment/Intervention    Patient Effort, Rehab Treatment good  -LM      Symptoms Noted During/After Treatment fatigue;increased pain  -LM      Recorded by [LM] Adriana Wilson PT      Pain Assessment    Pain Assessment 0-10  -LM      Pain Score 4  -LM      Post Pain Score 4  -LM      Pain Type Acute pain  -LM      Pain Location Hip  -LM      Pain Orientation Right  -LM      Pain Intervention(s) Repositioned;Ambulation/increased activity  -LM      Recorded by [LM] Adriana Wilson PT        User Key  (r) = Recorded By, (t) = Taken By, (c) = Cosigned By    Initials Name Effective Dates    ST Jeana Peralta, OTR 02/20/17 -     BD Swapna Morse, PT 06/13/16 -     LM Adriana Wilson, PT 06/09/17 -                 IP PT  Goals       02/01/18 1228 01/31/18 1119 01/30/18 1643    Bed Mobility PT LTG    Bed Mobility PT LTG, Date Established   01/30/18  -LM    Bed Mobility PT LTG, Time to Achieve   5 days  -LM    Bed Mobility PT LTG, Activity Type   supine to sit/sit to supine  -LM    Bed Mobility PT LTG, [place] Level   conditional independence  -LM    Bed Mobility PT LTG, Date Goal Reviewed  01/31/18  -LM     Bed Mobility PT LTG, Outcome goal ongoing  -BD goal ongoing  -LM     Transfer Training PT LTG    Transfer Training PT LTG, Date Established   01/30/18  -LM    Transfer Training PT LTG, Time to Achieve   5 days  -LM    Transfer Training PT LTG, Activity Type   sit to stand/stand to sit  -LM    Transfer Training PT LTG, [place] Level   conditional independence  -LM    Transfer Training PT LTG, Assist Device   walker, rolling  -LM    Transfer Training PT  LTG, Date Goal Reviewed  01/31/18  -LM     Transfer Training PT LTG, Outcome goal ongoing  -BD goal ongoing  -LM     Gait Training PT LTG    Gait Training Goal PT LTG, Date Established   01/30/18  -LM    Gait Training Goal PT LTG, Time to Achieve   5 days  -LM    Gait Training Goal PT LTG, [place] Level   contact guard assist  -LM    Gait Training Goal PT LTG, Assist Device   walker, rolling  -LM    Gait Training Goal PT LTG, Distance to Achieve   20 feet  -LM    Gait Training Goal PT LTG, Additional Goal   maintaining TDWB status  -LM    Gait Training Goal PT LTG, Date Goal Reviewed  01/31/18  -LM     Gait Training Goal PT LTG, Outcome goal ongoing  -BD goal ongoing  -LM       User Key  (r) = Recorded By, (t) = Taken By, (c) = Cosigned By    Initials Name Provider Type    YRIS Morse, PT Physical Therapist    LM Adriana Wilson, PT Physical Therapist          Physical Therapy Education     Title: PT OT SLP Therapies (Active)     Topic: Physical Therapy (Active)     Point: Mobility training (Active)    Learning Progress Summary    Learner Readiness Method  Response Comment Documented by Status   Patient Acceptance E,D NR   02/01/18 1227 Active    Acceptance E,D NR,VU Reviewed benefits of activity, safety with transfers, WB status, indications for use of KI, HEP.  01/31/18 1118 Done    Acceptance E,D NR Reviewed WB status, benefits of activity, safety with transfers.  01/30/18 1641 Active               Point: Home exercise program (Active)    Learning Progress Summary    Learner Readiness Method Response Comment Documented by Status   Patient Acceptance E,D NR   02/01/18 1227 Active    Acceptance E,D NR,VU Reviewed benefits of activity, safety with transfers, WB status, indications for use of KI, HEP.  01/31/18 1118 Done    Acceptance E,D NR Reviewed WB status, benefits of activity, safety with transfers.  01/30/18 1641 Active               Point: Body mechanics (Active)    Learning Progress Summary    Learner Readiness Method Response Comment Documented by Status   Patient Acceptance E,D NR   02/01/18 1227 Active    Acceptance E,D NR,VU Reviewed benefits of activity, safety with transfers, WB status, indications for use of KI, HEP.  01/31/18 1118 Done    Acceptance E,D NR Reviewed WB status, benefits of activity, safety with transfers.  01/30/18 1641 Active               Point: Precautions (Active)    Learning Progress Summary    Learner Readiness Method Response Comment Documented by Status   Patient Acceptance E,D NR   02/01/18 1227 Active    Acceptance E,D NR,VU Reviewed benefits of activity, safety with transfers, WB status, indications for use of KI, HEP.  01/31/18 1118 Done    Acceptance E,D NR Reviewed WB status, benefits of activity, safety with transfers.  01/30/18 1641 Active                      User Key     Initials Effective Dates Name Provider Type Discipline     06/13/16 -  Swapna Morse, PT Physical Therapist PT     06/09/17 -  Adriana Wilson, PT Physical Therapist PT                    PT Recommendation and  Plan  Anticipated Equipment Needs At Discharge: other (see comments) (defer to rehab)  Anticipated Discharge Disposition: inpatient rehabilitation facility (vs. SNF)  Planned Therapy Interventions: balance training, bed mobility training, gait training, home exercise program, patient/family education, ROM (Range of Motion), strengthening, transfer training  PT Frequency: daily  Plan of Care Review  Plan Of Care Reviewed With: patient  Progress: improving  Outcome Summary/Follow up Plan: Pt made good effort to stand and transfer to chair in room. Pt with good paiin control and good UE strength to assist when ambulating with walker. Assist needed, pt is blind.           Outcome Measures       02/01/18 1105 01/31/18 0829 01/31/18 0807    How much help from another person do you currently need...    Turning from your back to your side while in flat bed without using bedrails? 4  -BD  3  -LM    Moving from lying on back to sitting on the side of a flat bed without bedrails? 3  -BD  3  -LM    Moving to and from a bed to a chair (including a wheelchair)? 2  -BD  2  -LM    Standing up from a chair using your arms (e.g., wheelchair, bedside chair)? 3  -BD  2  -LM    Climbing 3-5 steps with a railing? 1  -BD  1  -LM    To walk in hospital room? 2  -BD  1  -LM    AM-PAC 6 Clicks Score 15  -BD  12  -LM    How much help from another is currently needed...    Putting on and taking off regular lower body clothing?  1  -ST     Bathing (including washing, rinsing, and drying)  1  -ST     Toileting (which includes using toilet bed pan or urinal)  1  -ST     Putting on and taking off regular upper body clothing  3  -ST     Taking care of personal grooming (such as brushing teeth)  3  -ST     Eating meals  3  -ST     Score  12  -ST     Functional Assessment    Outcome Measure Options AM-PAC 6 Clicks Basic Mobility (PT)  -BD  AM-PAC 6 Clicks Basic Mobility (PT)  -LM      01/30/18 0759 01/30/18 0751       How much help from another  person do you currently need...    Turning from your back to your side while in flat bed without using bedrails? 2  -LM      Moving from lying on back to sitting on the side of a flat bed without bedrails? 2  -LM      Moving to and from a bed to a chair (including a wheelchair)? 2  -LM      Standing up from a chair using your arms (e.g., wheelchair, bedside chair)? 2  -LM      Climbing 3-5 steps with a railing? 1  -LM      To walk in hospital room? 1  -LM      AM-PAC 6 Clicks Score 10  -LM      How much help from another is currently needed...    Putting on and taking off regular lower body clothing?  1  -ST     Bathing (including washing, rinsing, and drying)  1  -ST     Toileting (which includes using toilet bed pan or urinal)  1  -ST     Putting on and taking off regular upper body clothing  3  -ST     Taking care of personal grooming (such as brushing teeth)  3  -ST     Eating meals  3  -ST     Score  12  -ST     Functional Assessment    Outcome Measure Options AM-PAC 6 Clicks Basic Mobility (PT)  -LM AM-PAC 6 Clicks Daily Activity (OT)  -ST       User Key  (r) = Recorded By, (t) = Taken By, (c) = Cosigned By    Initials Name Provider Type     Jeana Peralta, OTR Occupational Therapist    YRIS Morse, PT Physical Therapist    KENNEDI Wilson, PT Physical Therapist           Time Calculation:         PT Charges       02/01/18 1231          Time Calculation    Start Time 1105  -BD      PT Received On 02/01/18  -      PT Goal Re-Cert Due Date 02/09/18  -      Time Calculation- PT    Total Timed Code Minutes- PT 30 minute(s)  -        User Key  (r) = Recorded By, (t) = Taken By, (c) = Cosigned By    Initials Name Provider Type    YRIS Morse, PT Physical Therapist          Therapy Charges for Today     Code Description Service Date Service Provider Modifiers Qty    36789359967 HC GAIT TRAINING EA 15 MIN 2/1/2018 Swapna Morse, PT GP 1    81757601901 HC PT THER SUPP EA 15 MIN  2/1/2018 Swapna Morse, PT GP 2    41057120002 HC PT THER PROC EA 15 MIN 2/1/2018 Swapna Morse, PT GP 1          PT G-Codes  Outcome Measure Options: AM-PAC 6 Clicks Basic Mobility (PT)    Swapna Morse, PT  2/1/2018

## 2018-02-01 NOTE — PLAN OF CARE
Problem: Patient Care Overview (Adult)  Goal: Plan of Care Review  Outcome: Ongoing (interventions implemented as appropriate)   02/01/18 1228   Patient Care Overview   Progress improving   Outcome Evaluation   Outcome Summary/Follow up Plan Pt made good effort to stand and transfer to chair in room. Pt with good paiin control and good UE strength to assist when ambulating with walker. Assist needed, pt is blind.    Coping/Psychosocial Response Interventions   Plan Of Care Reviewed With patient       Problem: Inpatient Physical Therapy  Goal: Bed Mobility Goal LTG- PT  Outcome: Ongoing (interventions implemented as appropriate)   02/01/18 1228   Bed Mobility PT LTG   Bed Mobility PT LTG, Outcome goal ongoing     Goal: Transfer Training Goal 1 LTG- PT  Outcome: Ongoing (interventions implemented as appropriate)   02/01/18 1228   Transfer Training PT LTG   Transfer Training PT LTG, Outcome goal ongoing     Goal: Gait Training Goal LTG- PT   02/01/18 1228   Gait Training PT LTG   Gait Training Goal PT LTG, Outcome goal ongoing

## 2018-02-01 NOTE — THERAPY TREATMENT NOTE
Acute Care - Physical Therapy Treatment Note  [facility]     Patient Name: Martin Moctezuma  : 1945  MRN: [id number]  Today's Date: 2018  Onset of Illness/Injury or Date of Surgery Date: 18  Date of Referral to PT: 18  Referring Physician: MD Bong    Admit Date: 2018    Visit Dx:    ICD-10-CM ICD-9-CM   1. Closed fracture of right hip, initial encounter S72.001A 820.8   2. Closed displaced intertrochanteric fracture of right femur, initial encounter S72.141A 820.21   3. Impaired functional mobility, balance, gait, and endurance Z74.09 V49.89   4. Impaired mobility and ADLs Z74.09 799.89     Patient Active Problem List   Diagnosis   • Ataxia   • Coronary artery disease   • Diabetes mellitus   • Hypertension   • Diabetic retinopathy   • Legally blind   • Anemia   • Closed fracture of right hip   • Tobacco dependence   • Urinary retention               Adult Rehabilitation Note       18 1105 18 0829 18 0807    Rehab Assessment/Intervention    Discipline physical therapist  -BD occupational therapist  -ST physical therapist  -LM    Document Type therapy note (daily note)  -BD therapy note (daily note)  -ST therapy note (daily note)  -LM    Subjective Information agree to therapy;complains of;pain  -BD no complaints;agree to therapy  -ST agree to therapy;complains of;pain  -LM    Patient Effort, Rehab Treatment good  -BD good  -ST good  -LM    Symptoms Noted During/After Treatment fatigue  -BD fatigue  -ST fatigue  -LM    Precautions/Limitations brace on when up;fall precautions;other (see comments)   insensate limb, WB status= PWB/ mobility/WBAT transfers only  -BD brace on when up;fall precautions;other (see comments)   KI/FICB; pt blind   -ST fall precautions;brace on when up;other (see comments)   legally blind, KI on when up, see WB status  -LM    Precautions/Limitations, Vision vision impairment, bilaterally  -BD vision impairment, bilaterally;other (see comments)    pt blind; prefers lights on in room to aid with shadows  -ST vision impairment, bilaterally;other (see comments)   Keep lights on after treatment as pt does not like being in   -LM    Specific Treatment Considerations  Verbal confirmation per Dr. Woody at 1345 on 1/31/18- pt to be WBAT at all times, transfers and ambulation.   -ST Verbal confirmation per Dr. Woody at 1345 on 1/31/18- pt to be WBAT at all times, transfers and ambulation.   -LM    Recorded by [BD] Swapna Morse, PT [ST] Jeana Peralta, OTR [LM] Adriana Wilson, PT    Pain Assessment    Pain Assessment 0-10  -BD 0-10  -ST 0-10  -LM    Pain Score 3  -BD 3  -ST 3  -LM    Post Pain Score 3  -BD 3  -ST 3  -LM    Pain Type Acute pain  -BD Acute pain  -ST Acute pain  -LM    Pain Location Hip  -BD Hip  -ST Hip  -LM    Pain Orientation Right  -BD Right  -ST Right  -LM    Pain Intervention(s) Repositioned;Ambulation/increased activity  -BD Repositioned;Ambulation/increased activity  -ST Repositioned;Ambulation/increased activity  -LM    Response to Interventions tolerated  -BD      Recorded by [BD] Swapna Morse, PT [ST] Jeana Peralta, OTR [LM] Adriana Wilson, PT    Vision Assessment/Intervention    Visual Impairment visual impairment, bilaterally  -BD  --   legally blind  -LM    Visual Acuity Comment pt is legally blind  -BD pt is legally blind  -ST     Recorded by [BD] Swapna Morse, PT [ST] Jeana Peralta, OTR [LM] Adriana Wilson PT    Cognitive Assessment/Intervention    Current Cognitive/Communication Assessment functional  -BD functional  -ST functional  -LM    Orientation Status oriented x 4  -BD oriented x 4  -ST oriented x 4  -LM    Follows Commands/Answers Questions 100% of the time;able to follow single-step instructions;needs increased time  -% of the time;able to follow single-step instructions;needs cueing  -% of the time;able to follow single-step instructions;needs cueing  -LM    Personal Safety  WNL/WFL  -BD WNL/WFL  -ST WNL/WFL  -LM    Personal Safety Interventions fall prevention program maintained;gait belt;nonskid shoes/slippers when out of bed  -BD fall prevention program maintained;gait belt;nonskid shoes/slippers when out of bed  -ST     Recorded by [BD] Swapna Morse, PT [ST] Jeana Peralta, OTR [LM] Adriana Wilson PT    MMT (Manual Muscle Testing)    General MMT Assessment Detail   Pt demo improved quad set and SAQ but continues to demo poor control  -LM    Recorded by   [LM] Adriana Wilson PT    Mobility Assessment/Training    Extremity Weight-Bearing Status right lower extremity  -BD right lower extremity  -ST right lower extremity  -LM    Right Lower Extremity Weight-Bearing weight-bearing as tolerated  -BD weight-bearing as tolerated  -ST touch down weight-bearing   WBAT for transfers only; TDWB at all other times  -LM    Recorded by [BD] Swapna Morse, PT [ST] Jeana Peralta, OTR [LM] Adriana Wilson, PT    Bed Mobility, Assessment/Treatment    Bed Mobility, Assistive Device head of bed elevated  -BD  head of bed elevated  -LM    Bed Mobility, Scoot/Bridge, [place]   supervision required;verbal cues required  -LM    Bed Mob, Supine to Sit, [place] minimum assist (75% patient effort);verbal cues required  -BD  minimum assist (75% patient effort);verbal cues required  -LM    Bed Mob, Sit to Supine, [place]   not tested   Pt left UIC  -LM    Bed Mobility, Safety Issues   decreased use of legs for bridging/pushing  -LM    Bed Mobility, Impairments   ROM decreased;strength decreased;impaired vision;pain  -LM    Bed Mobility, Comment  EOB upon arrival with PT   -ST Donned KI prior to OOB mobility due to poor but improved quad control. Verbal cues for sequencing. [place] required to bring right LE off EOB.  -LM    Recorded by [BD] Swapna Morse, PT [ST] Jeana Peralta, OTR [LM] Adriana Wilson PT    Transfer Assessment/Treatment    Transfers, Bed-Chair  [place] moderate assist (50% patient effort);2 person assist required  -BD moderate assist (50% patient effort);2 person assist required;verbal cues required  -ST moderate assist (50% patient effort);2 person assist required;verbal cues required  -LM    Transfers, Bed-Chair-Bed, Assist Device rolling walker  -BD rolling walker  -ST rolling walker  -LM    Transfers, Sit-Stand [place] minimum assist (75% patient effort);2 person assist required  -BD moderate assist (50% patient effort);2 person assist required;verbal cues required  -ST moderate assist (50% patient effort);2 person assist required;verbal cues required  -LM    Transfers, Stand-Sit [place] minimum assist (75% patient effort);2 person assist required  -BD moderate assist (50% patient effort);2 person assist required;verbal cues required  -ST moderate assist (50% patient effort);2 person assist required;verbal cues required  -LM    Transfers, Sit-Stand-Sit, Assist Device rolling walker  -BD rolling walker  -ST rolling walker  -LM    Transfer, Maintain Weight Bearing Status able to maintain weight bearing status  -BD  able to maintain weight bearing status   WBAT transfer only  -LM    Transfer, Safety Issues sequencing ability decreased;step length decreased;weight-shifting ability decreased  -BD sequencing ability decreased;step length decreased;weight-shifting ability decreased  -ST sequencing ability decreased;step length decreased;weight-shifting ability decreased  -LM    Transfer, Impairments ROM decreased;strength decreased  -BD ROM decreased;strength decreased;impaired vision;pain  -ST ROM decreased;strength decreased;impaired vision;pain  -LM    Transfer, Comment Pt needs increased time to move but wants to try transfers incependently with AD  -BD pt requires increased time to complete sit/stand d/t complexity of visual deficit-[place] for aiding with giving commands; cuing for hand placement, scooting to EOB and keeping sx LE forward  d/t KI   -ST Increased time for cueing and verbalizing sequencing. Cues for proper hand placement throughout and Pamela to step out right LE prior to t/f. Pt exhibited tremoring of muscles in right thigh after transfer from bed-chair.  -LM    Recorded by [BD] Swapna Morse, PT [ST] Jeana Peralta, OTR [LM] Adriana Wislon PT    Gait Assessment/Treatment    Gait, [place] Level other (see comments)   Pt transferrred to chair in room approximately 6 steps.  -BD  not appropriate to assess  -LM    Gait, Assistive Device rolling walker  -BD      Gait, Distance (Feet) 6  -BD      Gait, Gait Pattern Analysis swing-to gait   toe touch wb R LE, and WBAT with TTWB  -BD      Gait, Comment Pt is strong enough to use UE's to decrease WB on R LE  -BD      Recorded by [BD] Swapna Morse, PT  [LM] Adriana Wilson, PT    Functional Mobility    Functional Mobility- Comment  unable to progress steps d/t weakness and pain; PT completed bed mobility and ther-ex priior to OT arrival; d/t complexity of pt, pt requiring 2 skilled hands to assist and complete session together for safety; after PT left room, OT followed up with AE training and LBD education   -ST     Recorded by  [ST] Jeana Peralta, PRIYAR     Lower Body Dressing Assessment/Training    LB Dressing Assess/Train, Comment  pt unable to tolerate trunk flexion to attempt completion of LBD tis date d/t pain; issued leg  however for aid with positioning LE for comfort; once instructed on use, pt able to demonstrate use   -ST     Recorded by  [ST] Jeana Peralta, OTR     Motor Skills/Interventions    Additional Documentation   Balance Skills Training (Group)  -LM    Recorded by   [LM] Adriana Wilson PT    Balance Skills Training    Sitting-Level of Assistance   Close supervision  -LM    Sitting-Balance Support   Right upper extremity supported;Left upper extremity supported;Feet supported  -LM    Sitting # of Minutes   10  -LM    Standing-Level of Assistance    Minimum assistance;x2  -LM    Static Standing Balance Support   assistive device  -LM    Recorded by   [LM] Adriana Wilson PT    Therapy Exercises    Bilateral Lower Extremities AROM:;supine;ankle pumps/circles  -BD      Exercise Protocols   hip ORIF   IM hip screw  -LM    Hip ORIF Exercises   right:;10 reps;completed protocol;with assist;ankle pumps/circles;quad set;glut set;hip abduction;SAQ;LAQ;hip flexion;SLR   cues for technique; [place] SLR, heel slide, LAQ  -LM    Recorded by [BD] Swapna Morse, PT  [LM] Adriana Wilson PT    Positioning and Restraints    Pre-Treatment Position in bed  -BD in bed  -ST in bed  -LM    Post Treatment Position chair  -BD chair  -ST chair  -LM    In Chair reclined;call light within reach;encouraged to call for assist;exit alarm on;legs elevated;RLE elevated  -BD notified nsg;reclined;call light within reach;encouraged to call for assist;exit alarm on;on mechanical lift sling  -ST notified nsg;reclined;sitting;call light within reach;encouraged to call for assist;exit alarm on;legs elevated;with OT;on mechanical lift sling  -LM    Recorded by [BD] Swapna Morse, PT [ST] Jeana Peralta, OTR [LM] Adriana Wilson, PT      01/30/18 0759          Rehab Assessment/Intervention    Patient Effort, Rehab Treatment good  -LM      Symptoms Noted During/After Treatment fatigue;increased pain  -LM      Recorded by [LM] Adriana Wilson PT      Pain Assessment    Pain Assessment 0-10  -LM      Pain Score 4  -LM      Post Pain Score 4  -LM      Pain Type Acute pain  -LM      Pain Location Hip  -LM      Pain Orientation Right  -LM      Pain Intervention(s) Repositioned;Ambulation/increased activity  -LM      Recorded by [LM] Adriana Wilson PT        User Key  (r) = Recorded By, (t) = Taken By, (c) = Cosigned By    Initials Name Effective Dates    ST Jeana Peralta, OTR 02/20/17 -     BD Swapna Morse, PT 06/13/16 -     LM Adriana Wilson, PT 06/09/17 -                 IP PT  Goals       02/01/18 1228 01/31/18 1119 01/30/18 1643    Bed Mobility PT LTG    Bed Mobility PT LTG, Date Established   01/30/18  -LM    Bed Mobility PT LTG, Time to Achieve   5 days  -LM    Bed Mobility PT LTG, Activity Type   supine to sit/sit to supine  -LM    Bed Mobility PT LTG, [place] Level   conditional independence  -LM    Bed Mobility PT LTG, Date Goal Reviewed  01/31/18  -LM     Bed Mobility PT LTG, Outcome goal ongoing  -BD goal ongoing  -LM     Transfer Training PT LTG    Transfer Training PT LTG, Date Established   01/30/18  -LM    Transfer Training PT LTG, Time to Achieve   5 days  -LM    Transfer Training PT LTG, Activity Type   sit to stand/stand to sit  -LM    Transfer Training PT LTG, [place] Level   conditional independence  -LM    Transfer Training PT LTG, Assist Device   walker, rolling  -LM    Transfer Training PT  LTG, Date Goal Reviewed  01/31/18  -LM     Transfer Training PT LTG, Outcome goal ongoing  -BD goal ongoing  -LM     Gait Training PT LTG    Gait Training Goal PT LTG, Date Established   01/30/18  -LM    Gait Training Goal PT LTG, Time to Achieve   5 days  -LM    Gait Training Goal PT LTG, [place] Level   contact guard assist  -LM    Gait Training Goal PT LTG, Assist Device   walker, rolling  -LM    Gait Training Goal PT LTG, Distance to Achieve   20 feet  -LM    Gait Training Goal PT LTG, Additional Goal   maintaining TDWB status  -LM    Gait Training Goal PT LTG, Date Goal Reviewed  01/31/18  -LM     Gait Training Goal PT LTG, Outcome goal ongoing  -BD goal ongoing  -LM       User Key  (r) = Recorded By, (t) = Taken By, (c) = Cosigned By    Initials Name Provider Type    YRIS Morse, PT Physical Therapist    LM Adriana Wilson, PT Physical Therapist          Physical Therapy Education     Title: PT OT SLP Therapies (Active)     Topic: Physical Therapy (Active)     Point: Mobility training (Active)    Learning Progress Summary    Learner Readiness Method  Response Comment Documented by Status   Patient Acceptance E,D NR   02/01/18 1227 Active    Acceptance E,D NR,VU Reviewed benefits of activity, safety with transfers, WB status, indications for use of KI, HEP.  01/31/18 1118 Done    Acceptance E,D NR Reviewed WB status, benefits of activity, safety with transfers.  01/30/18 1641 Active               Point: Home exercise program (Active)    Learning Progress Summary    Learner Readiness Method Response Comment Documented by Status   Patient Acceptance E,D NR   02/01/18 1227 Active    Acceptance E,D NR,VU Reviewed benefits of activity, safety with transfers, WB status, indications for use of KI, HEP.  01/31/18 1118 Done    Acceptance E,D NR Reviewed WB status, benefits of activity, safety with transfers.  01/30/18 1641 Active               Point: Body mechanics (Active)    Learning Progress Summary    Learner Readiness Method Response Comment Documented by Status   Patient Acceptance E,D NR   02/01/18 1227 Active    Acceptance E,D NR,VU Reviewed benefits of activity, safety with transfers, WB status, indications for use of KI, HEP.  01/31/18 1118 Done    Acceptance E,D NR Reviewed WB status, benefits of activity, safety with transfers.  01/30/18 1641 Active               Point: Precautions (Active)    Learning Progress Summary    Learner Readiness Method Response Comment Documented by Status   Patient Acceptance E,D NR   02/01/18 1227 Active    Acceptance E,D NR,VU Reviewed benefits of activity, safety with transfers, WB status, indications for use of KI, HEP.  01/31/18 1118 Done    Acceptance E,D NR Reviewed WB status, benefits of activity, safety with transfers.  01/30/18 1641 Active                      User Key     Initials Effective Dates Name Provider Type Discipline     06/13/16 -  Swapna Morse, PT Physical Therapist PT     06/09/17 -  Adriana Wilson, PT Physical Therapist PT                    PT Recommendation and  Plan  Anticipated Equipment Needs At Discharge: other (see comments) (defer to rehab)  Anticipated Discharge Disposition: inpatient rehabilitation facility (vs. SNF)  Planned Therapy Interventions: balance training, bed mobility training, gait training, home exercise program, patient/family education, ROM (Range of Motion), strengthening, transfer training  PT Frequency: daily  Plan of Care Review  Plan Of Care Reviewed With: patient  Progress: improving  Outcome Summary/Follow up Plan: Pt made good effort to stand and transfer to chair in room. Pt with good paiin control and good UE strength to assist when ambulating with walker. Assist needed, pt is blind.           Outcome Measures       02/01/18 1105 01/31/18 0829 01/31/18 0807    How much help from another person do you currently need...    Turning from your back to your side while in flat bed without using bedrails? 4  -BD  3  -LM    Moving from lying on back to sitting on the side of a flat bed without bedrails? 3  -BD  3  -LM    Moving to and from a bed to a chair (including a wheelchair)? 2  -BD  2  -LM    Standing up from a chair using your arms (e.g., wheelchair, bedside chair)? 3  -BD  2  -LM    Climbing 3-5 steps with a railing? 1  -BD  1  -LM    To walk in hospital room? 2  -BD  1  -LM    AM-PAC 6 Clicks Score 15  -BD  12  -LM    How much help from another is currently needed...    Putting on and taking off regular lower body clothing?  1  -ST     Bathing (including washing, rinsing, and drying)  1  -ST     Toileting (which includes using toilet bed pan or urinal)  1  -ST     Putting on and taking off regular upper body clothing  3  -ST     Taking care of personal grooming (such as brushing teeth)  3  -ST     Eating meals  3  -ST     Score  12  -ST     Functional Assessment    Outcome Measure Options AM-PAC 6 Clicks Basic Mobility (PT)  -BD  AM-PAC 6 Clicks Basic Mobility (PT)  -LM      01/30/18 0759 01/30/18 0751       How much help from another  person do you currently need...    Turning from your back to your side while in flat bed without using bedrails? 2  -LM      Moving from lying on back to sitting on the side of a flat bed without bedrails? 2  -LM      Moving to and from a bed to a chair (including a wheelchair)? 2  -LM      Standing up from a chair using your arms (e.g., wheelchair, bedside chair)? 2  -LM      Climbing 3-5 steps with a railing? 1  -LM      To walk in hospital room? 1  -LM      AM-PAC 6 Clicks Score 10  -LM      How much help from another is currently needed...    Putting on and taking off regular lower body clothing?  1  -ST     Bathing (including washing, rinsing, and drying)  1  -ST     Toileting (which includes using toilet bed pan or urinal)  1  -ST     Putting on and taking off regular upper body clothing  3  -ST     Taking care of personal grooming (such as brushing teeth)  3  -ST     Eating meals  3  -ST     Score  12  -ST     Functional Assessment    Outcome Measure Options AM-PAC 6 Clicks Basic Mobility (PT)  -LM AM-PAC 6 Clicks Daily Activity (OT)  -ST       User Key  (r) = Recorded By, (t) = Taken By, (c) = Cosigned By    Initials Name Provider Type     Jeana Peralta, OTR Occupational Therapist    YRIS Morse, PT Physical Therapist    KENNEDI Wilson, PT Physical Therapist           Time Calculation:         PT Charges       02/01/18 1231          Time Calculation    Start Time 1105  -BD      PT Received On 02/01/18  -      PT Goal Re-Cert Due Date 02/09/18  -      Time Calculation- PT    Total Timed Code Minutes- PT 30 minute(s)  -        User Key  (r) = Recorded By, (t) = Taken By, (c) = Cosigned By    Initials Name Provider Type    YRIS Morse, PT Physical Therapist          Therapy Charges for Today     Code Description Service Date Service Provider Modifiers Qty    73070767702 HC GAIT TRAINING EA 15 MIN 2/1/2018 Swapna Morse, PT GP 1    09609741352 HC PT THER SUPP EA 15 MIN  2/1/2018 Swapna Morse, PT GP 2    77240718501 HC PT THER PROC EA 15 MIN 2/1/2018 Swapna Morse, PT GP 1          PT G-Codes  Outcome Measure Options: AM-PAC 6 Clicks Basic Mobility (PT)    Swapna Morse, PT  2/1/2018

## 2018-02-01 NOTE — PROGRESS NOTES
Continued Stay Note  [facility]     Patient Name: Martin Moctezuma  MRN: [id number]  Today's Date: 2/1/2018    Admit Date: 1/29/2018          Discharge Plan       02/01/18 1029    Case Management/Social Work Plan    Plan update    Additional Comments Pt now wants to go to [facility], I made referral to [facility] at [facility]. Await cb              Discharge Codes     None            Valencia Irvin RN

## 2018-02-01 NOTE — PROGRESS NOTES
"Orthopaedic Surgery Progress Note      LOS: 3 days   Patient Care Team:  Provider Not In System as PCP - General    POD 3    Subjective     Interval History:   Patient continues to have difficulty with urination and constipation this morning.  He is attempting a bowel movement this morning.  He denies chest pain or shortness of breath.  He does not feel weak or dizzy.    Objective     Vital Signs:  Temp (24hrs), Av.7 °F (36.5 °C), Min:97.4 °F (36.3 °C), Max:98.1 °F (36.7 °C)    BP 90/44 (BP Location: Right arm, Patient Position: Lying)  Pulse 69  Temp 97.4 °F (36.3 °C) (Temporal Artery )   Resp 17  Ht 172.7 cm (68\")  Wt 64.9 kg (143 lb)  SpO2 (!) 87%  BMI 21.74 kg/m2    Labs:  Lab Results (last 24 hours)     Procedure Component Value Units Date/Time    POC Glucose Once [[id number]]  (Abnormal) Collected:  18 1108    Specimen:  Blood Updated:  18 1110     Glucose 342 (H) mg/dL     Narrative:       Meter: [id number] : [id number] Neocleus    POC Glucose Once [[id number]]  (Abnormal) Collected:  18 1547    Specimen:  Blood Updated:  18 1549     Glucose 254 (H) mg/dL     Narrative:       Meter: [id number] : [id number] Roldan Ramirez    POC Glucose Once [[id number]]  (Abnormal) Collected:  18 1623    Specimen:  Blood Updated:  18 1626     Glucose 209 (H) mg/dL     Narrative:       Meter: [id number] : [id number] Neocleus    POC Glucose Once [[id number]]  (Abnormal) Collected:  18 2039    Specimen:  Blood Updated:  18 2042     Glucose 171 (H) mg/dL     Narrative:       Meter: [id number] : [id number] Yovany Carissa    POC Glucose Once [[id number]]  (Abnormal) Collected:  18 0410    Specimen:  Blood Updated:  18 0411     Glucose 304 (H) mg/dL     Narrative:       Meter: [id number] : [id number] Yovany Ferrer    Hemoglobin & Hematocrit, Blood [[id number]]  (Abnormal) Collected:  18 0530    Specimen:  Blood Updated:  18 " 0557     Hemoglobin 8.0 (L) g/dL      Hematocrit 24.5 (L) %     Basic Metabolic Panel [[id number]]  (Abnormal) Collected:  02/01/18 0530    Specimen:  Blood Updated:  02/01/18 0626     Glucose 310 (H) mg/dL      BUN 26 (H) mg/dL      Creatinine 1.40 (H) mg/dL      Sodium 128 (L) mmol/L      Potassium 4.9 mmol/L      Chloride 100 mmol/L      CO2 20.0 mmol/L      Calcium 8.0 (L) mg/dL      eGFR Non African Amer 50 (L) mL/min/1.73      BUN/Creatinine Ratio 18.6     Anion Gap 8.0 mmol/L     Narrative:       [employer] Guidelines    Stage     Description        GFR  1         Normal or High     90+  2         Mild decrease      60-89  3         Moderate decrease  30-59  4         Severe decrease    15-29  5         Kidney failure     <15    POC Glucose Once [[id number]]  (Abnormal) Collected:  02/01/18 0708    Specimen:  Blood Updated:  02/01/18 0709     Glucose 379 (H) mg/dL     Narrative:       Verify with Lab Meter: [id number] : [id number] Trudi Aguilera          Physical Exam:  Surgical dressings are in place but unraveling at the edges.  Calf is soft and nontender  Minimal swelling and no ecchymosis is noted    Assessment/Plan     POD #3 s/p trochanteric nail for right proximal femur fracture     PT and OT--patient may be weightbearing as tolerated on the right lower extremity at all times.      Lovenox for dvtpx.        Mild thrombocytopenia--observe.          Discontinue fascia iliaca block prior to discharge.      Acute blood loss anemia--Hct currently 25.  Patient asymptomatic.  Labs maybe diluted.        D/c planning--pt will need SNF.   Referral has been made to [facility].       Bowel protocol     Urinary retention--continue in and out cathetered when necessary and Flomax    Dressing change this morning     Upon discharge, patient will need staples removed 12-14 days after surgery.  Continue Aquacel dressings until its time to remove the staples.  After staple removal, patient  will need a dry dressing over his incisions at all times.  Continue Lovenox for 6 weeks total.  Follow-up with me in 3 weeks approximately.  Patient may weight-bear as tolerated on the right lower extremity.  No hip precautions necessary.  Titus Woody MD  02/01/18  8:01 AM

## 2018-02-01 NOTE — PROGRESS NOTES
Continued Stay Note  [facility]     Patient Name: Martin Moctezuma  MRN: [id number]  Today's Date: 2/1/2018    Admit Date: 1/29/2018          Discharge Plan  Consent obtained for the participation in the [facility] Transitions Program. Nishi Juarez RN         02/01/18 1029    Case Management/Social Work Plan    Plan update    Additional Comments Pt now wants to go to [facility], I made referral to [facility] at [facility].              Discharge Codes     None            Nishi Juarez RN

## 2018-02-01 NOTE — PLAN OF CARE
Problem: Patient Care Overview (Adult)  Goal: Plan of Care Review  Outcome: Ongoing (interventions implemented as appropriate)   02/01/18 0312   Patient Care Overview   Progress improving   Outcome Evaluation   Outcome Summary/Follow up Plan vss; dressing with scant drainage; pain managed with prn meds; straight cath'd @ MN, 50 ml voided on own.   Coping/Psychosocial Response Interventions   Plan Of Care Reviewed With patient     Goal: Adult Individualization and Mutuality  Outcome: Ongoing (interventions implemented as appropriate)    Goal: Discharge Needs Assessment  Outcome: Ongoing (interventions implemented as appropriate)      Problem: Perioperative Period (Adult)  Goal: Signs and Symptoms of Listed Potential Problems Will be Absent or Manageable (Perioperative Period)  Outcome: Ongoing (interventions implemented as appropriate)      Problem: Orthopaedic Fracture (Adult)  Goal: Signs and Symptoms of Listed Potential Problems Will be Absent or Manageable (Orthopaedic Fracture)  Outcome: Ongoing (interventions implemented as appropriate)      Problem: Fall Risk (Adult)  Goal: Identify Related Risk Factors and Signs and Symptoms  Outcome: Ongoing (interventions implemented as appropriate)    Goal: Absence of Falls  Outcome: Ongoing (interventions implemented as appropriate)

## 2018-02-01 NOTE — PLAN OF CARE
Problem: Patient Care Overview (Adult)  Goal: Plan of Care Review  Outcome: Ongoing (interventions implemented as appropriate)   02/01/18 1721   Patient Care Overview   Progress no change   Outcome Evaluation   Outcome Summary/Follow up Plan 73 yo male admitted 1/29/18 after fall resulted in right hip fracture s/p nailing. Nerve catheter removed today per ANS, blood sugar monitoring w/ adjustments to insulin based on pt request, provider aware of pt concerns r/t insulin coverage. Pt w/ increased anxiety and agitation this afternoon, beligerant at times. Calmed down as the afternoon progressed. Pt unable to void, requires I&O cath, urojet ordered as needed d/t discomfort.  Anticipate discharge to rehab when medically ready, case mgmt involved in plan of care.    Coping/Psychosocial Response Interventions   Plan Of Care Reviewed With patient

## 2018-02-02 LAB
ANION GAP SERPL CALCULATED.3IONS-SCNC: 6 MMOL/L (ref 3–11)
BACTERIA UR QL AUTO: ABNORMAL /HPF
BILIRUB UR QL STRIP: NEGATIVE
BUN BLD-MCNC: 31 MG/DL (ref 9–23)
BUN/CREAT SERPL: 22.1 (ref 7–25)
CALCIUM SPEC-SCNC: 8.2 MG/DL (ref 8.7–10.4)
CHLORIDE SERPL-SCNC: 100 MMOL/L (ref 99–109)
CLARITY UR: CLEAR
CO2 SERPL-SCNC: 25 MMOL/L (ref 20–31)
COLOR UR: YELLOW
CREAT BLD-MCNC: 1.4 MG/DL (ref 0.6–1.3)
FERRITIN SERPL-MCNC: 232 NG/ML (ref 22–322)
FOLATE SERPL-MCNC: 12.14 NG/ML (ref 3.2–20)
GFR SERPL CREATININE-BSD FRML MDRD: 50 ML/MIN/1.73
GLUCOSE BLD-MCNC: 65 MG/DL (ref 70–100)
GLUCOSE BLDC GLUCOMTR-MCNC: 127 MG/DL (ref 70–130)
GLUCOSE BLDC GLUCOMTR-MCNC: 172 MG/DL (ref 70–130)
GLUCOSE BLDC GLUCOMTR-MCNC: 185 MG/DL (ref 70–130)
GLUCOSE BLDC GLUCOMTR-MCNC: 292 MG/DL (ref 70–130)
GLUCOSE BLDC GLUCOMTR-MCNC: 50 MG/DL (ref 70–130)
GLUCOSE BLDC GLUCOMTR-MCNC: 93 MG/DL (ref 70–130)
GLUCOSE UR STRIP-MCNC: NEGATIVE MG/DL
HCT VFR BLD AUTO: 22.9 % (ref 38.9–50.9)
HGB BLD-MCNC: 7.4 G/DL (ref 13.1–17.5)
HGB UR QL STRIP.AUTO: NEGATIVE
HYALINE CASTS UR QL AUTO: ABNORMAL /LPF
IRON 24H UR-MRATE: 26 MCG/DL (ref 50–175)
IRON SATN MFR SERPL: 12 % (ref 20–50)
KETONES UR QL STRIP: NEGATIVE
LEUKOCYTE ESTERASE UR QL STRIP.AUTO: ABNORMAL
NITRITE UR QL STRIP: NEGATIVE
PH UR STRIP.AUTO: 5.5 [PH] (ref 5–8)
POTASSIUM BLD-SCNC: 4.4 MMOL/L (ref 3.5–5.5)
PROT UR QL STRIP: NEGATIVE
RBC # UR: ABNORMAL /HPF
REF LAB TEST METHOD: ABNORMAL
SODIUM BLD-SCNC: 131 MMOL/L (ref 132–146)
SP GR UR STRIP: 1.02 (ref 1–1.03)
SQUAMOUS #/AREA URNS HPF: ABNORMAL /HPF
TIBC SERPL-MCNC: 216 MCG/DL (ref 250–450)
UROBILINOGEN UR QL STRIP: ABNORMAL
VIT B12 BLD-MCNC: 230 PG/ML (ref 211–911)
WBC UR QL AUTO: ABNORMAL /HPF

## 2018-02-02 PROCEDURE — 99024 POSTOP FOLLOW-UP VISIT: CPT | Performed by: ORTHOPAEDIC SURGERY

## 2018-02-02 PROCEDURE — 63710000001 INSULIN DETEMIR PER 5 UNITS: Performed by: PHYSICIAN ASSISTANT

## 2018-02-02 PROCEDURE — 97116 GAIT TRAINING THERAPY: CPT

## 2018-02-02 PROCEDURE — 94640 AIRWAY INHALATION TREATMENT: CPT

## 2018-02-02 PROCEDURE — 85014 HEMATOCRIT: CPT | Performed by: PHYSICIAN ASSISTANT

## 2018-02-02 PROCEDURE — 80048 BASIC METABOLIC PNL TOTAL CA: CPT | Performed by: PHYSICIAN ASSISTANT

## 2018-02-02 PROCEDURE — 94799 UNLISTED PULMONARY SVC/PX: CPT

## 2018-02-02 PROCEDURE — 82962 GLUCOSE BLOOD TEST: CPT

## 2018-02-02 PROCEDURE — 87086 URINE CULTURE/COLONY COUNT: CPT | Performed by: PHYSICIAN ASSISTANT

## 2018-02-02 PROCEDURE — 97110 THERAPEUTIC EXERCISES: CPT

## 2018-02-02 PROCEDURE — 81001 URINALYSIS AUTO W/SCOPE: CPT | Performed by: PHYSICIAN ASSISTANT

## 2018-02-02 PROCEDURE — 97530 THERAPEUTIC ACTIVITIES: CPT

## 2018-02-02 PROCEDURE — 99232 SBSQ HOSP IP/OBS MODERATE 35: CPT | Performed by: PHYSICIAN ASSISTANT

## 2018-02-02 PROCEDURE — 85018 HEMOGLOBIN: CPT | Performed by: PHYSICIAN ASSISTANT

## 2018-02-02 PROCEDURE — 25010000002 ENOXAPARIN PER 10 MG: Performed by: ORTHOPAEDIC SURGERY

## 2018-02-02 RX ORDER — SODIUM CHLORIDE 9 MG/ML
75 INJECTION, SOLUTION INTRAVENOUS CONTINUOUS
Status: ACTIVE | OUTPATIENT
Start: 2018-02-02 | End: 2018-02-03

## 2018-02-02 RX ADMIN — FLUDROCORTISONE ACETATE 0.1 MG: 0.1 TABLET ORAL at 08:28

## 2018-02-02 RX ADMIN — NICOTINE 1 PATCH: 21 PATCH, EXTENDED RELEASE TRANSDERMAL at 08:28

## 2018-02-02 RX ADMIN — DEXTROSE MONOHYDRATE 12.5 G: 25 INJECTION, SOLUTION INTRAVENOUS at 16:11

## 2018-02-02 RX ADMIN — TAMSULOSIN HYDROCHLORIDE 0.4 MG: 0.4 CAPSULE ORAL at 08:28

## 2018-02-02 RX ADMIN — POLYETHYLENE GLYCOL 3350 17 G: 17 POWDER, FOR SOLUTION ORAL at 08:28

## 2018-02-02 RX ADMIN — LIDOCAINE HYDROCHLORIDE: 20 JELLY TOPICAL at 00:14

## 2018-02-02 RX ADMIN — LISINOPRIL 10 MG: 10 TABLET ORAL at 08:28

## 2018-02-02 RX ADMIN — SODIUM CHLORIDE 75 ML/HR: 9 INJECTION, SOLUTION INTRAVENOUS at 12:07

## 2018-02-02 RX ADMIN — INSULIN DETEMIR 4 UNITS: 100 INJECTION, SOLUTION SUBCUTANEOUS at 20:26

## 2018-02-02 RX ADMIN — INSULIN LISPRO 2 UNITS: 100 INJECTION, SOLUTION INTRAVENOUS; SUBCUTANEOUS at 12:07

## 2018-02-02 RX ADMIN — BUDESONIDE AND FORMOTEROL FUMARATE DIHYDRATE 2 PUFF: 80; 4.5 AEROSOL RESPIRATORY (INHALATION) at 19:57

## 2018-02-02 RX ADMIN — BUDESONIDE AND FORMOTEROL FUMARATE DIHYDRATE 2 PUFF: 80; 4.5 AEROSOL RESPIRATORY (INHALATION) at 07:34

## 2018-02-02 RX ADMIN — FUROSEMIDE 20 MG: 20 TABLET ORAL at 08:28

## 2018-02-02 RX ADMIN — PENTOXIFYLLINE 400 MG: 400 TABLET, FILM COATED, EXTENDED RELEASE ORAL at 08:29

## 2018-02-02 RX ADMIN — ASPIRIN 81 MG: 81 TABLET, COATED ORAL at 08:28

## 2018-02-02 RX ADMIN — HYDROCODONE BITARTRATE AND ACETAMINOPHEN 1 TABLET: 5; 325 TABLET ORAL at 14:52

## 2018-02-02 RX ADMIN — HYDROCODONE BITARTRATE AND ACETAMINOPHEN 1 TABLET: 5; 325 TABLET ORAL at 08:28

## 2018-02-02 RX ADMIN — PANTOPRAZOLE SODIUM 40 MG: 40 TABLET, DELAYED RELEASE ORAL at 05:31

## 2018-02-02 RX ADMIN — HYDROCODONE BITARTRATE AND ACETAMINOPHEN 1 TABLET: 5; 325 TABLET ORAL at 04:02

## 2018-02-02 RX ADMIN — BISOPROLOL FUMARATE 5 MG: 5 TABLET, COATED ORAL at 08:29

## 2018-02-02 RX ADMIN — PENTOXIFYLLINE 400 MG: 400 TABLET, FILM COATED, EXTENDED RELEASE ORAL at 12:07

## 2018-02-02 RX ADMIN — CETIRIZINE HYDROCHLORIDE 10 MG: 10 TABLET, FILM COATED ORAL at 08:28

## 2018-02-02 RX ADMIN — SERTRALINE HYDROCHLORIDE 100 MG: 100 TABLET ORAL at 08:28

## 2018-02-02 RX ADMIN — DORZOLAMIDE HYDROCHLORIDE 1 DROP: 20 SOLUTION OPHTHALMIC at 12:08

## 2018-02-02 RX ADMIN — BUPROPION HYDROCHLORIDE 150 MG: 150 TABLET, EXTENDED RELEASE ORAL at 08:28

## 2018-02-02 RX ADMIN — INSULIN DETEMIR 10 UNITS: 100 INJECTION, SOLUTION SUBCUTANEOUS at 10:59

## 2018-02-02 RX ADMIN — DORZOLAMIDE HYDROCHLORIDE 1 DROP: 20 SOLUTION OPHTHALMIC at 05:31

## 2018-02-02 RX ADMIN — HYDROCODONE BITARTRATE AND ACETAMINOPHEN 1 TABLET: 5; 325 TABLET ORAL at 21:03

## 2018-02-02 NOTE — PLAN OF CARE
Problem: Patient Care Overview (Adult)  Goal: Plan of Care Review  Outcome: Ongoing (interventions implemented as appropriate)   02/02/18 1051   Patient Care Overview   Progress progress toward functional goals is gradual   Outcome Evaluation   Outcome Summary/Follow up Plan Pt min a sit to stand and stand to sit using RW. Pt declined further mobility. Pt max A to attempt to doff socks limited by pain and decreased flexibility, and impaired vision limiting use of AE. Pt with good effort with UE strengthening.    Coping/Psychosocial Response Interventions   Plan Of Care Reviewed With patient       Problem: Inpatient Occupational Therapy  Goal: Bed Mobility Goal LTG- OT  Outcome: Ongoing (interventions implemented as appropriate)   01/30/18 1114 02/02/18 1051   Bed Mobility OT LTG   Bed Mobility OT LTG, Time to Achieve 5 days --    Bed Mobility OT LTG, Activity Type supine to sit/sit to supine --    Bed Mobility OT LTG, [place] Level contact guard assist --    Bed Mobility OT LTG, Assist Device leg ;bed rails --    Bed Mobility OT LTG, Outcome --  goal ongoing     Goal: Transfer Training Goal 1 LTG- OT  Outcome: Ongoing (interventions implemented as appropriate)   01/30/18 1114 02/02/18 1051   Transfer Training OT LTG   Transfer Training OT LTG, Time to Achieve 5 days --    Transfer Training OT LTG, [place] Level contact guard assist --    Transfer Training OT LTG, Assist Device walker, rolling --    Transfer Training OT LTG, Outcome --  goal ongoing     Goal: Toileting Goal LTG- OT  Outcome: Ongoing (interventions implemented as appropriate)   01/30/18 1114 02/02/18 1051   Toileting OT LTG   Toileting Goal OT LTG, Time to Achieve 5 days --    Toileting Goal OT LTG, [place] Level minimum assist (75% patient effort) --    Toileting Goal OT LTG, Additional Goal toileting/hygiene --    Toileting Goal OT LTG, Outcome --  goal ongoing     Goal: LB Dressing Goal LTG- OT  Outcome: Ongoing (interventions  implemented as appropriate)   01/30/18 1114 02/02/18 1051   LB Dressing OT LTG   LB Dressing Goal OT LTG, Time to Achieve 5 days --    LB Dressing Goal OT LTG, [place] Level minimum assist (75% patient effort) --    LB Dressing Goal OT LTG, Outcome --  goal ongoing  (max to doff socks)

## 2018-02-02 NOTE — PLAN OF CARE
Problem: Patient Care Overview (Adult)  Goal: Plan of Care Review  Outcome: Ongoing (interventions implemented as appropriate)   02/02/18 1132   Patient Care Overview   Progress progress toward functional goals as expected   Outcome Evaluation   Outcome Summary/Follow up Plan Pt ambulated 10 feet with minAx2 and RW, limited by pain and fatigue. KI discontinued due to improved quad control and strength. If pt continues to tolerate ambulation, will increase PT frequency to 2x/day. Will progress with mobility as able.    Coping/Psychosocial Response Interventions   Plan Of Care Reviewed With patient       Problem: Inpatient Physical Therapy  Goal: Bed Mobility Goal LTG- PT  Outcome: Ongoing (interventions implemented as appropriate)   01/30/18 1643 02/02/18 1132   Bed Mobility PT LTG   Bed Mobility PT LTG, Date Established 01/30/18 --    Bed Mobility PT LTG, Time to Achieve 5 days --    Bed Mobility PT LTG, Activity Type supine to sit/sit to supine --    Bed Mobility PT LTG, [place] Level conditional independence --    Bed Mobility PT LTG, Date Goal Reviewed --  02/02/18   Bed Mobility PT LTG, Outcome --  goal ongoing     Goal: Transfer Training Goal 1 LTG- PT  Outcome: Ongoing (interventions implemented as appropriate)   01/30/18 1643 02/02/18 1132   Transfer Training PT LTG   Transfer Training PT LTG, Date Established 01/30/18 --    Transfer Training PT LTG, Time to Achieve 5 days --    Transfer Training PT LTG, Activity Type sit to stand/stand to sit --    Transfer Training PT LTG, [place] Level conditional independence --    Transfer Training PT LTG, Assist Device walker, rolling --    Transfer Training PT LTG, Date Goal Reviewed --  02/02/18   Transfer Training PT LTG, Outcome --  goal ongoing     Goal: Gait Training Goal LTG- PT  Outcome: Revised   01/30/18 1643 02/02/18 1132   Gait Training PT LTG   Gait Training Goal PT LTG, Date Established 01/30/18 --    Gait Training Goal PT LTG, Time to Achieve 5  days --    Gait Training Goal PT LTG, [place] Level contact guard assist --    Gait Training Goal PT LTG, Assist Device walker, rolling --    Gait Training Goal PT LTG, Distance to Achieve 20 feet --    Gait Training Goal PT LTG, Date Goal Reviewed --  02/02/18   Gait Training Goal PT LTG, Outcome --  goal ongoing

## 2018-02-02 NOTE — PROGRESS NOTES
[facility] Medicine Services  PROGRESS NOTE    Patient Name: Martin Moctezuma  : 1945  MRN: [id number]    Date of Admission: 2018  Length of Stay: 4  Primary Care Physician: Provider Not In System    Subjective   Subjective     CC: F/U leg pain    HPI:    Sitting up in chair, no family present. In better spirits today. Able to urinate a little bit more today on his own.  Complains of some dysuria.  Has been in and out catheter twice in last 24 hours.  Says lidocaine helps with discomfort.  He is still refusing anticoagulation.  Reports decrease of by mouth intake, appetite, have discussed with nursing helping with tray setup.  Pain overall controlled, hurts with ambulation.  Nursing reports no overt signs bleeding      Review of Systems  Gen- No fevers, chills  CV- No chest pain, palpitations  Resp- No cough, dyspnea  GI- No N/V/D, abd pain  - urine retention, dysuria   Msk- RLE pain with ambulation  Neuro-no dizziness/lightheadedness  Otherwise ROS is negative except as mentioned in the HPI.    Objective   Objective     Vital Signs:   Temp:  [98.2 °F (36.8 °C)-99.4 °F (37.4 °C)] 98.3 °F (36.8 °C)  Heart Rate:  [69-71] 69  Resp:  [16-20] 16  BP: ()/(52-66) 146/66        Physical Exam:  Constitutional: No acute distress, awake and alert, Sitting up in chair  HENT: NCAT, mucous membranes moist  Respiratory: Clear to auscultation bilaterally, respiratory effort normal   Cardiovascular: RRR, no murmurs, rubs, or gallops  Gastrointestinal: Positive bowel sounds, soft, nontender, nondistended  Musculoskeletal: No bilateral ankle edema  Psychiatric:Calm, Cooperative   Neurologic: vision impaired.Cranial Nerves grossly intact to confrontation, speech clear  Skin: right lateral hip/thigh incision covered with aquacel C/D/I no surrounding erythema, Dressing is not saturated,significant ecchymosis.  No signs of bleeding.   No rashes    Results Reviewed:  I have personally reviewed  current lab, radiology, and data and agree.      Results from last 7 days  Lab Units 02/02/18  0513 02/01/18  0530 01/31/18  0518 01/30/18  0625 01/29/18  1021   WBC 10*3/mm3  --   --  10.00 9.69 9.61   HEMOGLOBIN g/dL 7.4* 8.0* 8.4* 8.7* 9.9*   HEMATOCRIT % 22.9* 24.5* 26.3* 26.8* 29.7*   PLATELETS 10*3/mm3  --   --  145* 143* 160   INR   --   --   --   --  0.98       Results from last 7 days  Lab Units 02/02/18  0513 02/01/18  0530 01/31/18  0518   SODIUM mmol/L 131* 128* 132   POTASSIUM mmol/L 4.4 4.9 5.1   CHLORIDE mmol/L 100 100 107   CO2 mmol/L 25.0 20.0 21.0   BUN mg/dL 31* 26* 26*   CREATININE mg/dL 1.40* 1.40* 1.30   GLUCOSE mg/dL 65* 310* 202*   CALCIUM mg/dL 8.2* 8.0* 8.2*     Estimated Creatinine Clearance: 45.1 mL/min (by C-G formula based on Cr of 1.4).  No results found for: BNP  No results found for: PHART    Microbiology Results Abnormal     None          Imaging Results (last 24 hours)     ** No results found for the last 24 hours. **        Results for orders placed during the hospital encounter of 10/20/16   Adult transthoracic echo complete    Narrative · Left ventricular function is normal. Estimated EF = 60%.  · Left ventricular diastolic dysfunction (grade I) consistent with   impaired relaxation.  · Mild mitral valve regurgitation is present  · Mild tricuspid valve regurgitation is present.  · Probably bicuspid aortic valve without significant aortic stenosis,   trace aortic regurgitation          I have reviewed the medications.    Assessment/Plan   Assessment / Plan     Hospital Problem List     * (Principal)Closed fracture of right hip    Coronary artery disease    Diabetes mellitus    Hypertension    Anemia    Tobacco dependence    Urinary retention             Brief Hospital Course to date:  Martin Moctezuma is a 72 y.o. male with CAD, DM2, HTN, anemia who presented after a mechanical fall at home and found to have closed right intertrochanteric femur fracture.      Assessment &  Plan:  Right hip fracture  -S/P repair by Dr. Woody 1/29  -Today patient is adamantly refusing DVT PPx. Concerned with risk of retinal hemorrhage and worsening vision. Offered to contact pt's Opthalmologic at , stated would refuse regarding. Understands risk of DVT/VTE/Stroke and for now refusing any anticoagulation.  -PT/OT-touchdown weightbearing RLE x 6 weeks except WBAT for transfers  -Pain control with PO and IV medications as needed      Mild increase of creatinine, stable, 1.4.  Given decreased po, give IV fluids over 24 hours. Encourage po fluids. BMP in a.m.    Post op anemia with hx of chronic anemia  -hgb 7.4 today, slowly declining. No active signs of bleeding. Has been refusing lovenox. Monitor closely for need of transfusion if <7 or symptomatic.   -will add on iron/b12/folate studies in a.m.    DM2  -HbA1c pending  -Levemir 10 units Qam (he is on Lantus 10 units Qam and 6 units Qpm at home). BG low this am, will decrease HS basal. Trying to give basal q12 as pt refused other adjustment am levemir. Add HS snack.  5units lispro qac  -SSI     Urinary retention  -added flomax  -continue bladder scan and I/O cath prn, tx constipation.     Dysuria  -likely r/t I/O cath, will check U/A      K+ improved with discontinuation of IVF with KCL  -monitor on lisinopril   Na+ stable, serum osmo wnl.       HTN  CAD  -Resume antihypertensives tomorrow-lisinopril, lasix (BP meds held this a.m.2nd to low BP). He is on Fludrocortisone daily likely for orthostatic hypotension. Monitor BP  -Continue bisoprolol (adding holding parameters)  -Resumed ASA  -Continue crestor     Tobacco dependence  -NRT  -Counseled on cessation    Constipation  -aggressive bowel rx. Constipation resolved.    DVT Prophylaxis:  Refusing Lovenox. CORNELIUS/SCD as     CODE STATUS: Conditional Code    Disposition: I expect the patient to be discharged pending rehab. Possibly bed in a.m. At [facility] per CM    Casie M Mayne, PA-C  02/02/18  11:44 AM

## 2018-02-02 NOTE — PROGRESS NOTES
Continued Stay Note  [facility]     Patient Name: Martin Moctezuma  MRN: [id number]  Today's Date: 2/2/2018    Admit Date: 1/29/2018          Discharge Plan       02/02/18 1458    Case Management/Social Work Plan    Plan [facility] pulmonary unit    Additional Comments [facility] has a bed at [facility] pulmonary unit on Saturday. Family will transport. Nurse will call report  [phone]  fax transfer summary  [phone] . I will email nurse extenders.              Discharge Codes     None        Expected Discharge Date and Time     Expected Discharge Date Expected Discharge Time    Feb 3, 2018             Valencia Irvin RN

## 2018-02-02 NOTE — THERAPY TREATMENT NOTE
Acute Care - Physical Therapy Treatment Note  [facility]     Patient Name: Martin Moctezuma  : 1945  MRN: [id number]  Today's Date: 2018  Onset of Illness/Injury or Date of Surgery Date: 18  Date of Referral to PT: 18  Referring Physician: MD Bong    Admit Date: 2018    Visit Dx:    ICD-10-CM ICD-9-CM   1. Closed fracture of right hip, initial encounter S72.001A 820.8   2. Closed displaced intertrochanteric fracture of right femur, initial encounter S72.141A 820.21   3. Impaired functional mobility, balance, gait, and endurance Z74.09 V49.89   4. Impaired mobility and ADLs Z74.09 799.89     Patient Active Problem List   Diagnosis   • Ataxia   • Coronary artery disease   • Diabetes mellitus   • Hypertension   • Diabetic retinopathy   • Legally blind   • Anemia   • Closed fracture of right hip   • Tobacco dependence   • Urinary retention               Adult Rehabilitation Note       18 1008 18 0944 18 1105    Rehab Assessment/Intervention    Discipline occupational therapist  -AC physical therapist  -LM physical therapist  -BD    Document Type therapy note (daily note)  -AC therapy note (daily note)  -LM therapy note (daily note)  -BD    Subjective Information agree to therapy;complains of;pain  -AC agree to therapy;complains of;pain  -LM agree to therapy;complains of;pain  -BD    Patient Effort, Rehab Treatment adequate  -AC good  -LM good  -BD    Symptoms Noted During/After Treatment fatigue  -AC fatigue  -LM fatigue  -BD    Precautions/Limitations brace on when up;fall precautions   blind- sees shadows; WBAT per Dr. Díaz  -AC fall precautions   KI discontinued, pt legally blind - can see shadows  -LM brace on when up;fall precautions;other (see comments)   insensate limb, WB status= PWB/ mobility/WBAT transfers only  -BD    Precautions/Limitations, Vision vision impairment, bilaterally  -AC vision impairment, bilaterally   legally blind - can see shadows  -LM  vision impairment, bilaterally  -BD    Specific Treatment Considerations  Pt prefers lights to remain on in room.  -LM     Recorded by [AC] Lucila Flannery OT [LM] Adriana Wilson, PT [BD] Swapna Morse, PT    Pain Assessment    Pain Assessment 0-10  -AC 0-10  -LM 0-10  -BD    Pain Score 1   8/10 with movement  -AC 3  -LM 3  -BD    Post Pain Score 1  -AC 3  -LM 3  -BD    Pain Type Acute pain  -AC Acute pain  -LM Acute pain  -BD    Pain Location Hip  -AC Hip  -LM Hip  -BD    Pain Orientation Right  -AC Right  -LM Right  -BD    Pain Intervention(s) Repositioned  -AC Repositioned;Ambulation/increased activity  -LM Repositioned;Ambulation/increased activity  -BD    Response to Interventions tolerated  -AC  tolerated  -BD    Recorded by [AC] Lucila Flannery, OT [LM] Adriana Wilson, PT [BD] Swapna Morse, PT    Vision Assessment/Intervention    Visual Impairment visual impairment, bilaterally   leggally blind  -AC visual impairment, bilaterally   legally blind  -LM visual impairment, bilaterally  -BD    Visual Acuity Comment   pt is legally blind  -BD    Recorded by [AC] Lucila Flannery, OT [LM] Adriana Wilson, PT [BD] Swapna Morse, PT    Cognitive Assessment/Intervention    Current Cognitive/Communication Assessment functional  -AC functional  -LM functional  -BD    Orientation Status oriented x 4  -AC oriented x 4  -LM oriented x 4  -BD    Follows Commands/Answers Questions 100% of the time  -% of the time;able to follow single-step instructions;needs cueing  -% of the time;able to follow single-step instructions;needs increased time  -BD    Personal Safety WNL/WFL  -AC WNL/WFL  -LM WNL/WFL  -BD    Personal Safety Interventions fall prevention program maintained;gait belt;nonskid shoes/slippers when out of bed  -AC  fall prevention program maintained;gait belt;nonskid shoes/slippers when out of bed  -BD    Recorded by [AC] Lucila Flannery, OT [LM] Adriana Wilson, PT [BD] Swapna Morse, PT     MMT (Manual Muscle Testing)    General MMT Assessment Detail  Discontinued KI this session due to improved quad control and strength.  -LM     Recorded by  [LM] Adriana Wilson PT     Mobility Assessment/Training    Extremity Weight-Bearing Status right lower extremity  -AC right lower extremity  -LM right lower extremity  -BD    Right Lower Extremity Weight-Bearing weight-bearing as tolerated  -AC weight-bearing as tolerated   transfers and ambulation  -LM weight-bearing as tolerated  -BD    Recorded by [AC] Lucila Flannery OT [LM] Adriana Wilson, PT [BD] Swapna Morse PT    Bed Mobility, Assessment/Treatment    Bed Mobility, Assistive Device   head of bed elevated  -BD    Bed Mob, Supine to Sit, [place] --   reviewed use of leg  to assist leg in/out of bed  -AC not tested  -LM minimum assist (75% patient effort);verbal cues required  -BD    Bed Mob, Sit to Supine, [place]  not tested  -LM     Bed Mobility, Comment UIC  -AC Pt received and left UIC  -LM     Recorded by [AC] Lucila Flannery, OT [LM] Adriana Wilson, PT [BD] Swapna Morse, PT    Transfer Assessment/Treatment    Transfers, Bed-Chair [place]   moderate assist (50% patient effort);2 person assist required  -BD    Transfers, Bed-Chair-Bed, Assist Device   rolling walker  -BD    Transfers, Sit-Stand [place] verbal cues required;minimum assist (75% patient effort)  -AC minimum assist (75% patient effort);2 person assist required;verbal cues required  -LM minimum assist (75% patient effort);2 person assist required  -BD    Transfers, Stand-Sit [place] verbal cues required;minimum assist (75% patient effort)  -AC minimum assist (75% patient effort);2 person assist required;verbal cues required  -LM minimum assist (75% patient effort);2 person assist required  -BD    Transfers, Sit-Stand-Sit, Assist Device rolling walker  -AC rolling walker  -LM rolling walker  -BD    Toilet Transfer, [place] --   pt declined  -AC       Transfer, Maintain Weight Bearing Status   able to maintain weight bearing status  -BD    Transfer, Safety Issues sequencing ability decreased;weight-shifting ability decreased  -AC sequencing ability decreased;step length decreased;weight-shifting ability decreased  -LM sequencing ability decreased;step length decreased;weight-shifting ability decreased  -BD    Transfer, Impairments impaired balance;strength decreased  -AC ROM decreased;strength decreased;pain;impaired vision  -LM ROM decreased;strength decreased  -BD    Transfer, Comment VCs to step R LE  out when sitting  -AC Verbal cues to push from chair armrests when standing, reach for armrests when sitting, step out right LE prior to t/f.  -LM Pt needs increased time to move but wants to try transfers incependently with AD  -BD    Recorded by [AC] Lucila Flannery, OT [LM] Adriana Wilson, PT [BD] Swapna Morse, PT    Gait Assessment/Treatment    Gait, [place] Level  minimum assist (75% patient effort);2 person assist required;verbal cues required  -LM other (see comments)   Pt transferrred to chair in room approximately 6 steps.  -BD    Gait, Assistive Device  rolling walker  -LM rolling walker  -BD    Gait, Distance (Feet)  10  -LM 6  -BD    Gait, Gait Pattern Analysis  swing-to gait  -LM swing-to gait   toe touch wb R LE, and WBAT with TTWB  -BD    Gait, Maintain Weight Bearing Status  able to maintain weight bearing status   WBAT  -LM     Gait, Safety Issues  sequencing ability decreased;step length decreased;weight-shifting ability decreased  -LM     Gait, Impairments  ROM decreased;strength decreased;impaired vision;pain  -LM     Gait, Comment  Pt ambulated with step-to pattern at slow pace with forward flexed posture. Verbal cues for upright posture, increase right LE weight-bearing, and decrease bilateral UE weight-bearing. No knee buckling noted. Gait limited by increased pain in right LE.  -LM Pt is strong enough to use UE's to decrease  WB on R LE  -BD    Recorded by  [LM] Adriana Wilson, PT [BD] Swapna Morse, PT    Functional Mobility    Functional Mobility- Comment pt declined d/t recently ambulating with PT  -AC      Recorded by [AC] Lucila Flannery OT      Lower Body Dressing Assessment/Training    LB Dressing Assess/Train, Clothing Type doffing:;slipper socks  -AC      LB Dressing Assess/Train, Position standing  -AC      LB Dressing Assess/Train, [place] maximum assist (25% patient effort)  -AC      LB Dressing Assess/Train, Impairments decreased flexibility;ROM decreased  -AC      LB Dressing Assess/Train, Comment attempted to doff flexing fwd at trunk, able to slide sock down to ankle, but unable to pull over foot  -AC      Recorded by [AC] Lucila Flannery OT      Toileting Assessment/Training    Toileting Assess/Train, Comment pt declining need to void  -AC      Recorded by [AC] Lucila Flannery OT      Balance Skills Training    Standing-Level of Assistance Moderate assistance  -AC      Static Standing Balance Support assistive device  -AC      Recorded by [AC] Lucila Flannery OT      Therapy Exercises    Bilateral Lower Extremities   AROM:;supine;ankle pumps/circles  -BD    BUE Resistance manual resistance- moderate   2 sets x 10 B elbow f/e  -AC      Exercise Protocols  hip ORIF   IM hip screw  -LM     Hip ORIF Exercises  right:;10 reps;completed protocol;with assist;ankle pumps/circles;quad set;glut set;hip abduction;SAQ;LAQ;hip flexion;SLR   cues for technique; Pamela SLR, hip abd, hip flex  -LM     Recorded by [AC] Lcuila Flannery, OT [LM] Adriana Wilson, PT [BD] Swapna Morse, PT    Positioning and Restraints    Pre-Treatment Position sitting in chair/recliner  -AC sitting in chair/recliner  -LM in bed  -BD    Post Treatment Position chair  -AC chair  -LM chair  -BD    In Chair reclined;call light within reach;encouraged to call for assist;exit alarm on  -AC notified nsg;reclined;sitting;call light within reach;encouraged  to call for assist;exit alarm on;legs elevated   needs within reach  -LM reclined;call light within reach;encouraged to call for assist;exit alarm on;legs elevated;RLE elevated  -BD    Recorded by [AC] Lucila Flannery OT [LM] Adriana Wilson, PT [BD] Swapna MARIE Morse, PT      01/31/18 0829 01/31/18 0807       Rehab Assessment/Intervention    Discipline occupational therapist  -ST physical therapist  -LM     Document Type therapy note (daily note)  -ST therapy note (daily note)  -LM     Subjective Information no complaints;agree to therapy  -ST agree to therapy;complains of;pain  -LM     Patient Effort, Rehab Treatment good  -ST good  -LM     Symptoms Noted During/After Treatment fatigue  -ST fatigue  -LM     Precautions/Limitations brace on when up;fall precautions;other (see comments)   KI/FICB; pt blind   -ST fall precautions;brace on when up;other (see comments)   legally blind, KI on when up, see WB status  -LM     Precautions/Limitations, Vision vision impairment, bilaterally;other (see comments)   pt blind; prefers lights on in room to aid with shadows  -ST vision impairment, bilaterally;other (see comments)   Keep lights on after treatment as pt does not like being in   -     Specific Treatment Considerations Verbal confirmation per Dr. Woody at 1345 on 1/31/18- pt to be WBAT at all times, transfers and ambulation.   -ST Verbal confirmation per Dr. Woody at 1345 on 1/31/18- pt to be WBAT at all times, transfers and ambulation.   -LM     Recorded by [ST] Jeana Peralta OTR [LM] Adriana Wilson, PT     Pain Assessment    Pain Assessment 0-10  -ST 0-10  -LM     Pain Score 3  -ST 3  -LM     Post Pain Score 3  -ST 3  -LM     Pain Type Acute pain  -ST Acute pain  -LM     Pain Location Hip  -ST Hip  -LM     Pain Orientation Right  -ST Right  -LM     Pain Intervention(s) Repositioned;Ambulation/increased activity  -ST Repositioned;Ambulation/increased activity  -LM     Recorded by [ST] Jeana Peralta,  OTR [LM] Adriana Wilson PT     Vision Assessment/Intervention    Visual Impairment  --   legally blind  -LM     Visual Acuity Comment pt is legally blind  -ST      Recorded by [ST] Jeana Peralta OTR [LM] Adriana Wilson PT     Cognitive Assessment/Intervention    Current Cognitive/Communication Assessment functional  -ST functional  -LM     Orientation Status oriented x 4  -ST oriented x 4  -LM     Follows Commands/Answers Questions 100% of the time;able to follow single-step instructions;needs cueing  -% of the time;able to follow single-step instructions;needs cueing  -LM     Personal Safety WNL/WFL  -ST WNL/WFL  -LM     Personal Safety Interventions fall prevention program maintained;gait belt;nonskid shoes/slippers when out of bed  -ST      Recorded by [ST] Jeana Peralta, PRIYAR [LM] Adriana Wilson PT     MMT (Manual Muscle Testing)    General MMT Assessment Detail  Pt demo improved quad set and SAQ but continues to demo poor control  -LM     Recorded by  [LM] Adriana Wilson PT     Mobility Assessment/Training    Extremity Weight-Bearing Status right lower extremity  -ST right lower extremity  -LM     Right Lower Extremity Weight-Bearing weight-bearing as tolerated  -ST touch down weight-bearing   WBAT for transfers only; TDWB at all other times  -LM     Recorded by [ST] Jeana Peralta, PRIYAR [LM] Adriana Wilson PT     Bed Mobility, Assessment/Treatment    Bed Mobility, Assistive Device  head of bed elevated  -LM     Bed Mobility, Scoot/Bridge, [place]  supervision required;verbal cues required  -LM     Bed Mob, Supine to Sit, [place]  minimum assist (75% patient effort);verbal cues required  -LM     Bed Mob, Sit to Supine, [place]  not tested   Pt left UIC  -LM     Bed Mobility, Safety Issues  decreased use of legs for bridging/pushing  -LM     Bed Mobility, Impairments  ROM decreased;strength decreased;impaired vision;pain  -LM     Bed Mobility, Comment EOB upon arrival with  PT   -ST Donned KI prior to OOB mobility due to poor but improved quad control. Verbal cues for sequencing. Pamela required to bring right LE off EOB.  -LM     Recorded by [ST] Jeana Peralta OTR [LM] Adriana Wilson, PT     Transfer Assessment/Treatment    Transfers, Bed-Chair [place] moderate assist (50% patient effort);2 person assist required;verbal cues required  -ST moderate assist (50% patient effort);2 person assist required;verbal cues required  -LM     Transfers, Bed-Chair-Bed, Assist Device rolling walker  -ST rolling walker  -LM     Transfers, Sit-Stand [place] moderate assist (50% patient effort);2 person assist required;verbal cues required  -ST moderate assist (50% patient effort);2 person assist required;verbal cues required  -LM     Transfers, Stand-Sit [place] moderate assist (50% patient effort);2 person assist required;verbal cues required  -ST moderate assist (50% patient effort);2 person assist required;verbal cues required  -LM     Transfers, Sit-Stand-Sit, Assist Device rolling walker  -ST rolling walker  -LM     Transfer, Maintain Weight Bearing Status  able to maintain weight bearing status   WBAT transfer only  -LM     Transfer, Safety Issues sequencing ability decreased;step length decreased;weight-shifting ability decreased  -ST sequencing ability decreased;step length decreased;weight-shifting ability decreased  -LM     Transfer, Impairments ROM decreased;strength decreased;impaired vision;pain  -ST ROM decreased;strength decreased;impaired vision;pain  -LM     Transfer, Comment pt requires increased time to complete sit/stand d/t complexity of visual deficit-[place] A for aiding with giving commands; cuing for hand placement, scooting to EOB and keeping sx LE forward d/t KI   -ST Increased time for cueing and verbalizing sequencing. Cues for proper hand placement throughout and Pamela to step out right LE prior to t/f. Pt exhibited tremoring of muscles in right thigh after  transfer from bed-chair.  -LM     Recorded by [ST] Jeana Peralta OTR [LM] Adriana Wilson PT     Gait Assessment/Treatment    Gait, [place] Level  not appropriate to assess  -LM     Recorded by  [LM] Adriana Wilson PT     Functional Mobility    Functional Mobility- Comment unable to progress steps d/t weakness and pain; PT completed bed mobility and ther-ex priior to OT arrival; d/t complexity of pt, pt requiring 2 skilled hands to assist and complete session together for safety; after PT left room, OT followed up with AE training and LBD education   -ST      Recorded by [ST] Jeana Peralta, OTR      Lower Body Dressing Assessment/Training    LB Dressing Assess/Train, Comment pt unable to tolerate trunk flexion to attempt completion of LBD tis date d/t pain; issued leg  however for aid with positioning LE for comfort; once instructed on use, pt able to demonstrate use   -ST      Recorded by [ST] Jeana Peralta OTR      Motor Skills/Interventions    Additional Documentation  Balance Skills Training (Group)  -LM     Recorded by  [LM] Adriana Wilson PT     Balance Skills Training    Sitting-Level of Assistance  Close supervision  -LM     Sitting-Balance Support  Right upper extremity supported;Left upper extremity supported;Feet supported  -LM     Sitting # of Minutes  10  -LM     Standing-Level of Assistance  Minimum assistance;x2  -LM     Static Standing Balance Support  assistive device  -LM     Recorded by  [LM] Adriana Wilson, PT     Therapy Exercises    Exercise Protocols  hip ORIF   IM hip screw  -LM     Hip ORIF Exercises  right:;10 reps;completed protocol;with assist;ankle pumps/circles;quad set;glut set;hip abduction;SAQ;LAQ;hip flexion;SLR   cues for technique; Pamela SLR, heel slide, LAQ  -LM     Recorded by  [LM] Adriana Wilson, PT     Positioning and Restraints    Pre-Treatment Position in bed  -ST in bed  -LM     Post Treatment Position chair  -ST chair  -LM     In Chair notified  nsg;reclined;call light within reach;encouraged to call for assist;exit alarm on;on mechanical lift sling  -ST notified nsg;reclined;sitting;call light within reach;encouraged to call for assist;exit alarm on;legs elevated;with OT;on mechanical lift sling  -LM     Recorded by [ST] Jeana Peralta, OTR [LM] Adriana Wilson, PT       User Key  (r) = Recorded By, (t) = Taken By, (c) = Cosigned By    Initials Name Effective Dates    AC Lucila Flannery, OT 06/23/15 -     ST Jeana Peralta, OTR 02/20/17 -     BD Swapna MARIE Morse, PT 06/13/16 -     LM Adriana Wilson, PT 06/09/17 -                 IP PT Goals       02/02/18 1132 02/01/18 1228 01/31/18 1119    Bed Mobility PT LTG    Bed Mobility PT LTG, Date Goal Reviewed 02/02/18  -LM  01/31/18  -LM    Bed Mobility PT LTG, Outcome goal ongoing  -LM goal ongoing  -BD goal ongoing  -LM    Transfer Training PT LTG    Transfer Training PT  LTG, Date Goal Reviewed 02/02/18  -LM  01/31/18  -LM    Transfer Training PT LTG, Outcome goal ongoing  -LM goal ongoing  -BD goal ongoing  -LM    Gait Training PT LTG    Gait Training Goal PT LTG, Date Goal Reviewed 02/02/18  -LM  01/31/18  -LM    Gait Training Goal PT LTG, Outcome goal ongoing  -LM goal ongoing  -BD goal ongoing  -LM      01/30/18 1643          Bed Mobility PT LTG    Bed Mobility PT LTG, Date Established 01/30/18  -LM      Bed Mobility PT LTG, Time to Achieve 5 days  -LM      Bed Mobility PT LTG, Activity Type supine to sit/sit to supine  -LM      Bed Mobility PT LTG, [place] Level conditional independence  -LM      Transfer Training PT LTG    Transfer Training PT LTG, Date Established 01/30/18  -LM      Transfer Training PT LTG, Time to Achieve 5 days  -LM      Transfer Training PT LTG, Activity Type sit to stand/stand to sit  -LM      Transfer Training PT LTG, [place] Level conditional independence  -LM      Transfer Training PT LTG, Assist Device walker, rolling  -LM      Gait Training PT LTG    Gait  Training Goal PT LTG, Date Established 01/30/18  -LM      Gait Training Goal PT LTG, Time to Achieve 5 days  -LM      Gait Training Goal PT LTG, [place] Level contact guard assist  -LM      Gait Training Goal PT LTG, Assist Device walker, rolling  -LM      Gait Training Goal PT LTG, Distance to Achieve 20 feet  -LM      Gait Training Goal PT LTG, Additional Goal maintaining TDWB status  -LM        User Key  (r) = Recorded By, (t) = Taken By, (c) = Cosigned By    Initials Name Provider Type    RYIS Morse, PT Physical Therapist    LM Adriana Wilson, PT Physical Therapist          Physical Therapy Education     Title: PT OT SLP Therapies (Active)     Topic: Physical Therapy (Active)     Point: Mobility training (Active)    Learning Progress Summary    Learner Readiness Method Response Comment Documented by Status   Patient Acceptance E,D NR Reviewed benefits of activity, WB status, HEP, correct gait mechanics, safety with transfers.  02/02/18 1132 Active    Acceptance E KAREN   02/01/18 1721 Done    Acceptance E,D NR   02/01/18 1227 Active    Acceptance E,D NR,VU Reviewed benefits of activity, safety with transfers, WB status, indications for use of KI, HEP.  01/31/18 1118 Done    Acceptance E,D NR Reviewed WB status, benefits of activity, safety with transfers.  01/30/18 1641 Active               Point: Home exercise program (Active)    Learning Progress Summary    Learner Readiness Method Response Comment Documented by Status   Patient Acceptance E,D NR Reviewed benefits of activity, WB status, HEP, correct gait mechanics, safety with transfers.  02/02/18 1132 Active    Acceptance E KAREN   02/01/18 1721 Done    Acceptance E,D NR   02/01/18 1227 Active    Acceptance E,D NR,VU Reviewed benefits of activity, safety with transfers, WB status, indications for use of KI, HEP.  01/31/18 1118 Done    Acceptance E,D NR Reviewed WB status, benefits of activity, safety with transfers.  01/30/18  1641 Active               Point: Body mechanics (Active)    Learning Progress Summary    Learner Readiness Method Response Comment Documented by Status   Patient Acceptance E,D NR Reviewed benefits of activity, WB status, HEP, correct gait mechanics, safety with transfers.  02/02/18 1132 Active    Acceptance E VU   02/01/18 1721 Done    Acceptance E,D NR   02/01/18 1227 Active    Acceptance E,D NR,VU Reviewed benefits of activity, safety with transfers, WB status, indications for use of KI, HEP.  01/31/18 1118 Done    Acceptance E,D NR Reviewed WB status, benefits of activity, safety with transfers.  01/30/18 1641 Active               Point: Precautions (Active)    Learning Progress Summary    Learner Readiness Method Response Comment Documented by Status   Patient Acceptance E,D NR Reviewed benefits of activity, WB status, HEP, correct gait mechanics, safety with transfers.  02/02/18 1132 Active    Acceptance E VU   02/01/18 1721 Done    Acceptance E,D NR   02/01/18 1227 Active    Acceptance E,D NR,VU Reviewed benefits of activity, safety with transfers, WB status, indications for use of KI, HEP.  01/31/18 1118 Done    Acceptance E,D NR Reviewed WB status, benefits of activity, safety with transfers.  01/30/18 1641 Active                      User Key     Initials Effective Dates Name Provider Type Discipline     06/13/16 -  Swapna Morse, PT Physical Therapist PT     02/06/17 -  Myles Ramírez, RN Registered Nurse Nurse     06/09/17 -  Adriana Wilson, PT Physical Therapist PT                    PT Recommendation and Plan  Anticipated Equipment Needs At Discharge: other (see comments) (defer to rehab)  Anticipated Discharge Disposition: inpatient rehabilitation facility (vs. SNF)  Planned Therapy Interventions: balance training, bed mobility training, gait training, home exercise program, patient/family education, ROM (Range of Motion), strengthening, transfer training  PT  Frequency: daily  Plan of Care Review  Plan Of Care Reviewed With: patient  Progress: progress toward functional goals as expected  Outcome Summary/Follow up Plan: Pt ambulated 10 feet with minAx2 and RW, limited by pain and fatigue. KI discontinued due to improved quad control and strength. If pt continues to tolerate ambulation, will increase PT frequency to 2x/day. Will progress with mobility as able.           Outcome Measures       02/02/18 1008 02/02/18 0944 02/01/18 1105    How much help from another person do you currently need...    Turning from your back to your side while in flat bed without using bedrails?  3  -LM 4  -BD    Moving from lying on back to sitting on the side of a flat bed without bedrails?  3  -LM 3  -BD    Moving to and from a bed to a chair (including a wheelchair)?  3  -LM 2  -BD    Standing up from a chair using your arms (e.g., wheelchair, bedside chair)?  3  -LM 3  -BD    Climbing 3-5 steps with a railing?  1  -LM 1  -BD    To walk in hospital room?  3  -LM 2  -BD    AM-PAC 6 Clicks Score  16  -LM 15  -BD    How much help from another is currently needed...    Putting on and taking off regular lower body clothing? 1  -AC      Bathing (including washing, rinsing, and drying) 2  -AC      Toileting (which includes using toilet bed pan or urinal) 1  -AC      Putting on and taking off regular upper body clothing 3  -AC      Taking care of personal grooming (such as brushing teeth) 3  -AC      Eating meals 3  -AC      Score 13  -AC      Functional Assessment    Outcome Measure Options AM-PAC 6 Clicks Daily Activity (OT)  -AC AM-PAC 6 Clicks Basic Mobility (PT)  -LM AM-PAC 6 Clicks Basic Mobility (PT)  -BD      01/31/18 0829 01/31/18 0807       How much help from another person do you currently need...    Turning from your back to your side while in flat bed without using bedrails?  3  -LM     Moving from lying on back to sitting on the side of a flat bed without bedrails?  3  -LM     Moving  to and from a bed to a chair (including a wheelchair)?  2  -LM     Standing up from a chair using your arms (e.g., wheelchair, bedside chair)?  2  -LM     Climbing 3-5 steps with a railing?  1  -LM     To walk in hospital room?  1  -LM     AM-PAC 6 Clicks Score  12  -LM     How much help from another is currently needed...    Putting on and taking off regular lower body clothing? 1  -ST      Bathing (including washing, rinsing, and drying) 1  -ST      Toileting (which includes using toilet bed pan or urinal) 1  -ST      Putting on and taking off regular upper body clothing 3  -ST      Taking care of personal grooming (such as brushing teeth) 3  -ST      Eating meals 3  -ST      Score 12  -ST      Functional Assessment    Outcome Measure Options  AM-PAC 6 Clicks Basic Mobility (PT)  -LM       User Key  (r) = Recorded By, (t) = Taken By, (c) = Cosigned By    Initials Name Provider Type    AC Lucila Flannery, OT Occupational Therapist    ST Jeana Peralta, OTR Occupational Therapist    YRIS Morse, PT Physical Therapist    LM Adriana Wilson, PT Physical Therapist           Time Calculation:         PT Charges       02/02/18 1139          Time Calculation    Start Time 0944  -LM      PT Received On 02/02/18  -LM      PT Goal Re-Cert Due Date 02/09/18  -LM      Time Calculation- PT    Total Timed Code Minutes- PT 23 minute(s)  -LM        User Key  (r) = Recorded By, (t) = Taken By, (c) = Cosigned By    Initials Name Provider Type    LM Adriana Wilson, PT Physical Therapist          Therapy Charges for Today     Code Description Service Date Service Provider Modifiers Qty    91887724108 HC GAIT TRAINING EA 15 MIN 2/2/2018 Adriana Wilson, PT GP 1    23999289131 HC PT THER PROC EA 15 MIN 2/2/2018 Adriana Wilson, PT GP 1    83129615022 HC PT THER SUPP EA 15 MIN 2/2/2018 Adriana Wilson, PT GP 2          PT G-Codes  Outcome Measure Options: AM-PAC 6 Clicks Daily Activity (OT)    Adriana Wilson  PT  2/2/2018

## 2018-02-02 NOTE — THERAPY TREATMENT NOTE
Acute Care - Occupational Therapy Treatment Note  [facility]     Patient Name: Martin Moctezuma  : 1945  MRN: [id number]  Today's Date: 2018  Onset of Illness/Injury or Date of Surgery Date: 18  Date of Referral to OT: 18  Referring Physician: MD Bong      Admit Date: 2018    Visit Dx:     ICD-10-CM ICD-9-CM   1. Closed fracture of right hip, initial encounter S72.001A 820.8   2. Closed displaced intertrochanteric fracture of right femur, initial encounter S72.141A 820.21   3. Impaired functional mobility, balance, gait, and endurance Z74.09 V49.89   4. Impaired mobility and ADLs Z74.09 799.89     Patient Active Problem List   Diagnosis   • Ataxia   • Coronary artery disease   • Diabetes mellitus   • Hypertension   • Diabetic retinopathy   • Legally blind   • Anemia   • Closed fracture of right hip   • Tobacco dependence   • Urinary retention             Adult Rehabilitation Note       18 1008 18 1105 18 0829    Rehab Assessment/Intervention    Discipline occupational therapist  -AC physical therapist  -BD occupational therapist  -ST    Document Type therapy note (daily note)  -AC therapy note (daily note)  -BD therapy note (daily note)  -ST    Subjective Information agree to therapy;complains of;pain  -AC agree to therapy;complains of;pain  -BD no complaints;agree to therapy  -ST    Patient Effort, Rehab Treatment adequate  -AC good  -BD good  -ST    Symptoms Noted During/After Treatment fatigue  -AC fatigue  -BD fatigue  -ST    Precautions/Limitations brace on when up;fall precautions   blind- sees shadows; WBAT per Dr. Díaz  -AC brace on when up;fall precautions;other (see comments)   insensate limb, WB status= PWB/ mobility/WBAT transfers only  -BD brace on when up;fall precautions;other (see comments)   KI/FICB; pt blind   -ST    Precautions/Limitations, Vision vision impairment, bilaterally  -AC vision impairment, bilaterally  -BD vision impairment,  bilaterally;other (see comments)   pt blind; prefers lights on in room to aid with shadows  -ST    Specific Treatment Considerations   Verbal confirmation per Dr. Woody at 1345 on 1/31/18- pt to be WBAT at all times, transfers and ambulation.   -ST    Recorded by [AC] Lucila Flannery, OT [BD] Swapna Morse, PT [ST] Jeana Peralta, OTR    Pain Assessment    Pain Assessment 0-10  -AC 0-10  -BD 0-10  -ST    Pain Score 1   8/10 with movement  -AC 3  -BD 3  -ST    Post Pain Score 1  -AC 3  -BD 3  -ST    Pain Type Acute pain  -AC Acute pain  -BD Acute pain  -ST    Pain Location Hip  -AC Hip  -BD Hip  -ST    Pain Orientation Right  -AC Right  -BD Right  -ST    Pain Intervention(s) Repositioned  -AC Repositioned;Ambulation/increased activity  -BD Repositioned;Ambulation/increased activity  -ST    Response to Interventions tolerated  -AC tolerated  -BD     Recorded by [AC] Lucila Flannery, OT [BD] Swapna Morse, PT [ST] Jeana Peralta, OTR    Vision Assessment/Intervention    Visual Impairment visual impairment, bilaterally   leggally blind  -AC visual impairment, bilaterally  -BD     Visual Acuity Comment  pt is legally blind  -BD pt is legally blind  -ST    Recorded by [AC] Lucila Flannery, OT [BD] Swapna Morse, PT [ST] Jeana Peralta, OTR    Cognitive Assessment/Intervention    Current Cognitive/Communication Assessment functional  -AC functional  -BD functional  -ST    Orientation Status oriented x 4  -AC oriented x 4  -BD oriented x 4  -ST    Follows Commands/Answers Questions 100% of the time  -% of the time;able to follow single-step instructions;needs increased time  -% of the time;able to follow single-step instructions;needs cueing  -ST    Personal Safety WNL/WFL  -AC WNL/WFL  -BD WNL/WFL  -ST    Personal Safety Interventions fall prevention program maintained;gait belt;nonskid shoes/slippers when out of bed  -AC fall prevention program maintained;gait belt;nonskid shoes/slippers  when out of bed  -BD fall prevention program maintained;gait belt;nonskid shoes/slippers when out of bed  -ST    Recorded by [AC] Lucila Flannery, OT [BD] Swapna Morse, PT [ST] Jeana Peralta, PRIYAR    Mobility Assessment/Training    Extremity Weight-Bearing Status right lower extremity  -AC right lower extremity  -BD right lower extremity  -ST    Right Lower Extremity Weight-Bearing weight-bearing as tolerated  -AC weight-bearing as tolerated  -BD weight-bearing as tolerated  -ST    Recorded by [AC] Lucila Flannery, OT [BD] Swapna Morse, PT [ST] Jeana Peralta, OTR    Bed Mobility, Assessment/Treatment    Bed Mobility, Assistive Device  head of bed elevated  -BD     Bed Mob, Supine to Sit, [place] --   reviewed use of leg  to assist leg in/out of bed  -AC minimum assist (75% patient effort);verbal cues required  -BD     Bed Mobility, Comment UIC  -AC  EOB upon arrival with PT   -ST    Recorded by [AC] Lucila Flannery, OT [BD] Swapna Morse, PT [ST] Jeana Peralta, OTR    Transfer Assessment/Treatment    Transfers, Bed-Chair [place]  moderate assist (50% patient effort);2 person assist required  -BD moderate assist (50% patient effort);2 person assist required;verbal cues required  -ST    Transfers, Bed-Chair-Bed, Assist Device  rolling walker  -BD rolling walker  -ST    Transfers, Sit-Stand [place] verbal cues required;minimum assist (75% patient effort)  -AC minimum assist (75% patient effort);2 person assist required  -BD moderate assist (50% patient effort);2 person assist required;verbal cues required  -ST    Transfers, Stand-Sit [place] verbal cues required;minimum assist (75% patient effort)  -AC minimum assist (75% patient effort);2 person assist required  -BD moderate assist (50% patient effort);2 person assist required;verbal cues required  -ST    Transfers, Sit-Stand-Sit, Assist Device rolling walker  -AC rolling walker  -BD rolling walker  -ST    Toilet Transfer,  [place] --   pt declined  -AC      Transfer, Maintain Weight Bearing Status  able to maintain weight bearing status  -BD     Transfer, Safety Issues sequencing ability decreased;weight-shifting ability decreased  -AC sequencing ability decreased;step length decreased;weight-shifting ability decreased  -BD sequencing ability decreased;step length decreased;weight-shifting ability decreased  -ST    Transfer, Impairments impaired balance;strength decreased  -AC ROM decreased;strength decreased  -BD ROM decreased;strength decreased;impaired vision;pain  -ST    Transfer, Comment VCs to step R LE  out when sitting  -AC Pt needs increased time to move but wants to try transfers incependently with AD  -BD pt requires increased time to complete sit/stand d/t complexity of visual deficit-[place] A for aiding with giving commands; cuing for hand placement, scooting to EOB and keeping sx LE forward d/t KI   -ST    Recorded by [AC] Lucila Flannery, OT [BD] Swapna Morse, PT [ST] Jeana Peralta, OTR    Gait Assessment/Treatment    Gait, [place] Level  other (see comments)   Pt transferrred to chair in room approximately 6 steps.  -BD     Gait, Assistive Device  rolling walker  -BD     Gait, Distance (Feet)  6  -BD     Gait, Gait Pattern Analysis  swing-to gait   toe touch wb R LE, and WBAT with TTWB  -BD     Gait, Comment  Pt is strong enough to use UE's to decrease WB on R LE  -BD     Recorded by  [BD] Swapna Morse, PT     Functional Mobility    Functional Mobility- Comment pt declined d/t recently ambulating with PT  -AC  unable to progress steps d/t weakness and pain; PT completed bed mobility and ther-ex priior to OT arrival; d/t complexity of pt, pt requiring 2 skilled hands to assist and complete session together for safety; after PT left room, OT followed up with AE training and LBD education   -ST    Recorded by [AC] Lucila Flannery, OT  [ST] Jeana Peralta, OTR    Lower Body Dressing Assessment/Training     LB Dressing Assess/Train, Clothing Type doffing:;slipper socks  -AC      LB Dressing Assess/Train, Position standing  -      LB Dressing Assess/Train, [place] maximum assist (25% patient effort)  -AC      LB Dressing Assess/Train, Impairments decreased flexibility;ROM decreased  -      LB Dressing Assess/Train, Comment attempted to doff flexing fwd at trunk, able to slide sock down to ankle, but unable to pull over foot  -AC  pt unable to tolerate trunk flexion to attempt completion of LBD tis date d/t pain; issued leg  however for aid with positioning LE for comfort; once instructed on use, pt able to demonstrate use   -ST    Recorded by [AC] Lucila Flannery, OT  [ST] Jeana Peralta, OTR    Toileting Assessment/Training    Toileting Assess/Train, Comment pt declining need to void  -AC      Recorded by [AC] Lucila Flannery, OT      Balance Skills Training    Standing-Level of Assistance Moderate assistance  -AC      Static Standing Balance Support assistive device  -AC      Recorded by [AC] Lucila Flannery OT      Therapy Exercises    Bilateral Lower Extremities  AROM:;supine;ankle pumps/circles  -BD     BUE Resistance manual resistance- moderate   2 sets x 10 B elbow f/e  -AC      Recorded by [AC] Lucila Flannery, OT [BD] Swapna Morse, PT     Positioning and Restraints    Pre-Treatment Position sitting in chair/recliner  -AC in bed  -BD in bed  -ST    Post Treatment Position chair  -AC chair  -BD chair  -ST    In Chair reclined;call light within reach;encouraged to call for assist;exit alarm on  -AC reclined;call light within reach;encouraged to call for assist;exit alarm on;legs elevated;RLE elevated  -BD notified nsg;reclined;call light within reach;encouraged to call for assist;exit alarm on;on mechanical lift sling  -ST    Recorded by [AC] Lucila Flannery, OT [BD] Swapna Morse, PT [ST] Jeana Peralta, OTR      01/31/18 0807          Rehab Assessment/Intervention    Discipline physical  therapist  -LM      Document Type therapy note (daily note)  -LM      Subjective Information agree to therapy;complains of;pain  -LM      Patient Effort, Rehab Treatment good  -LM      Symptoms Noted During/After Treatment fatigue  -LM      Precautions/Limitations fall precautions;brace on when up;other (see comments)   legally blind, KI on when up, see WB status  -LM      Precautions/Limitations, Vision vision impairment, bilaterally;other (see comments)   Keep lights on after treatment as pt does not like being in   -LM      Specific Treatment Considerations Verbal confirmation per Dr. Woody at 1345 on 1/31/18- pt to be WBAT at all times, transfers and ambulation.   -LM      Recorded by [LM] Adriana Wilson PT      Pain Assessment    Pain Assessment 0-10  -LM      Pain Score 3  -LM      Post Pain Score 3  -LM      Pain Type Acute pain  -LM      Pain Location Hip  -LM      Pain Orientation Right  -LM      Pain Intervention(s) Repositioned;Ambulation/increased activity  -LM      Recorded by [LM] Adriana Wilson PT      Vision Assessment/Intervention    Visual Impairment --   legally blind  -LM      Recorded by [LM] Adriana Wilson PT      Cognitive Assessment/Intervention    Current Cognitive/Communication Assessment functional  -LM      Orientation Status oriented x 4  -LM      Follows Commands/Answers Questions 100% of the time;able to follow single-step instructions;needs cueing  -LM      Personal Safety WNL/WFL  -LM      Recorded by [LM] Adriana Wilson PT      MMT (Manual Muscle Testing)    General MMT Assessment Detail Pt demo improved quad set and SAQ but continues to demo poor control  -LM      Recorded by [LM] Adriana Wilson PT      Mobility Assessment/Training    Extremity Weight-Bearing Status right lower extremity  -LM      Right Lower Extremity Weight-Bearing touch down weight-bearing   WBAT for transfers only; TDWB at all other times  -LM      Recorded by [LM] Adriana Wilson PT      Bed  Mobility, Assessment/Treatment    Bed Mobility, Assistive Device head of bed elevated  -LM      Bed Mobility, Scoot/Bridge, [place] supervision required;verbal cues required  -LM      Bed Mob, Supine to Sit, [place] minimum assist (75% patient effort);verbal cues required  -LM      Bed Mob, Sit to Supine, [place] not tested   Pt left UIC  -LM      Bed Mobility, Safety Issues decreased use of legs for bridging/pushing  -LM      Bed Mobility, Impairments ROM decreased;strength decreased;impaired vision;pain  -LM      Bed Mobility, Comment Donned KI prior to OOB mobility due to poor but improved quad control. Verbal cues for sequencing. Pamela required to bring right LE off EOB.  -LM      Recorded by [LM] Adriana Wilson, PT      Transfer Assessment/Treatment    Transfers, Bed-Chair [place] moderate assist (50% patient effort);2 person assist required;verbal cues required  -LM      Transfers, Bed-Chair-Bed, Assist Device rolling walker  -LM      Transfers, Sit-Stand [place] moderate assist (50% patient effort);2 person assist required;verbal cues required  -LM      Transfers, Stand-Sit [place] moderate assist (50% patient effort);2 person assist required;verbal cues required  -LM      Transfers, Sit-Stand-Sit, Assist Device rolling walker  -LM      Transfer, Maintain Weight Bearing Status able to maintain weight bearing status   WBAT transfer only  -LM      Transfer, Safety Issues sequencing ability decreased;step length decreased;weight-shifting ability decreased  -LM      Transfer, Impairments ROM decreased;strength decreased;impaired vision;pain  -LM      Transfer, Comment Increased time for cueing and verbalizing sequencing. Cues for proper hand placement throughout and Pamela to step out right LE prior to t/f. Pt exhibited tremoring of muscles in right thigh after transfer from bed-chair.  -LM      Recorded by [LM] Adriana Wilson, PT      Gait Assessment/Treatment    Gait, [place]  Level not appropriate to assess  -LM      Recorded by [LM] Adriana Wilson PT      Motor Skills/Interventions    Additional Documentation Balance Skills Training (Group)  -LM      Recorded by [LM] Adriana Wilson PT      Balance Skills Training    Sitting-Level of Assistance Close supervision  -LM      Sitting-Balance Support Right upper extremity supported;Left upper extremity supported;Feet supported  -LM      Sitting # of Minutes 10  -LM      Standing-Level of Assistance Minimum assistance;x2  -LM      Static Standing Balance Support assistive device  -LM      Recorded by [LM] Adriana Wilson PT      Therapy Exercises    Exercise Protocols hip ORIF   IM hip screw  -LM      Hip ORIF Exercises right:;10 reps;completed protocol;with assist;ankle pumps/circles;quad set;glut set;hip abduction;SAQ;LAQ;hip flexion;SLR   cues for technique; Pamela SLR, heel slide, LAQ  -LM      Recorded by [LM] Adriana Wilson PT      Positioning and Restraints    Pre-Treatment Position in bed  -LM      Post Treatment Position chair  -LM      In Chair notified nsg;reclined;sitting;call light within reach;encouraged to call for assist;exit alarm on;legs elevated;with OT;on mechanical lift sling  -LM      Recorded by [LM] Adriana Wilson PT        User Key  (r) = Recorded By, (t) = Taken By, (c) = Cosigned By    Initials Name Effective Dates    AC Lucila Flannery, OT 06/23/15 -     ST Jeana Peralta, OTR 02/20/17 -     BD Swapna Morse, PT 06/13/16 -     LM Adriana Wilson, PT 06/09/17 -                 OT Goals       02/02/18 1051 01/31/18 1453 01/30/18 1114    Bed Mobility OT LTG    Bed Mobility OT LTG, Time to Achieve   5 days  -ST    Bed Mobility OT LTG, Activity Type   supine to sit/sit to supine  -ST    Bed Mobility OT LTG, [place] Level   contact guard assist  -ST    Bed Mobility OT LTG, Assist Device   leg ;bed rails  -ST    Bed Mobility OT LTG, Outcome goal ongoing  -AC goal ongoing  -ST goal ongoing  -ST     Transfer Training OT LTG    Transfer Training OT LTG, Time to Achieve   5 days  -ST    Transfer Training OT LTG, [place] Level   contact guard assist  -ST    Transfer Training OT LTG, Assist Device   walker, rolling  -ST    Transfer Training OT LTG, Outcome goal ongoing  -AC goal ongoing  -ST goal ongoing  -ST    Toileting OT LTG    Toileting Goal OT LTG, Time to Achieve   5 days  -ST    Toileting Goal OT LTG, [place] Level   minimum assist (75% patient effort)  -ST    Toileting Goal OT LTG, Additional Goal   toileting/hygiene  -ST    Toileting Goal OT LTG, Outcome goal ongoing  -AC goal ongoing  -ST goal ongoing  -ST    LB Dressing OT LTG    LB Dressing Goal OT LTG, Time to Achieve   5 days  -ST    LB Dressing Goal OT LTG, [place] Level   minimum assist (75% patient effort)  -ST    LB Dressing Goal OT LTG, Outcome goal ongoing   max to doff socks  -AC goal ongoing  -ST goal ongoing  -ST      User Key  (r) = Recorded By, (t) = Taken By, (c) = Cosigned By    Initials Name Provider Type    AC Lucila Flannery, OT Occupational Therapist    ST Jeana Peralta OTR Occupational Therapist          Occupational Therapy Education     Title: PT OT SLP Therapies (Done)     Topic: Occupational Therapy (Done)     Point: ADL training (Done)    Description: Instruct learner(s) on proper safety adaptation and remediation techniques during self care or transfers.   Instruct in proper use of assistive devices.    Learning Progress Summary    Learner Readiness Method Response Comment Documented by Status   Patient Acceptance E NR,VU walker safety; use of leg  AC 02/02/18 1050 Done    Acceptance E VU  JR 02/01/18 1721 Done    Acceptance E,TB,D VU,DU benefits of activity, role of OT, transfers, rwx use, AE, ADL retraining, leg  use ST 01/31/18 1453 Done    Acceptance E,TB,D VU,DU role of OT, benefits of activity, bed mobility, transfer safety, WB status, rwx use, hand placement, POC ST 01/30/18 1112 Done                Point: Home exercise program (Done)    Description: Instruct learner(s) on appropriate technique for monitoring, assisting and/or progressing therapeutic exercises/activities.    Learning Progress Summary    Learner Readiness Method Response Comment Documented by Status   Patient Acceptance E [facility] 02/01/18 1721 Done    Acceptance E,TB,D VU,DU benefits of activity, role of OT, transfers, rwx use, AE, ADL retraining, leg  use ST 01/31/18 1453 Done    Acceptance E,TB,D VU,DU role of OT, benefits of activity, bed mobility, transfer safety, WB status, rwx use, hand placement, POC ST 01/30/18 1112 Done               Point: Precautions (Done)    Description: Instruct learner(s) on prescribed precautions during self-care and functional transfers.    Learning Progress Summary    Learner Readiness Method Response Comment Documented by Status   Patient Acceptance E [facility] 02/01/18 1721 Done    Acceptance E,TB,D VU,DU benefits of activity, role of OT, transfers, rwx use, AE, ADL retraining, leg  use ST 01/31/18 1453 Done    Acceptance E,TB,D VU,DU role of OT, benefits of activity, bed mobility, transfer safety, WB status, rwx use, hand placement, POC ST 01/30/18 1112 Done               Point: Body mechanics (Done)    Description: Instruct learner(s) on proper positioning and spine alignment during self-care, functional mobility activities and/or exercises.    Learning Progress Summary    Learner Readiness Method Response Comment Documented by Status   Patient Acceptance E [facility] 02/01/18 1721 Done    Acceptance E,TB,D VU,DU benefits of activity, role of OT, transfers, rwx use, AE, ADL retraining, leg  use ST 01/31/18 1453 Done    Acceptance E,TB,D VU,DU role of OT, benefits of activity, bed mobility, transfer safety, WB status, rwx use, hand placement, POC ST 01/30/18 1112 Done                      User Key     Initials Effective Dates Name Provider Type Discipline    AC 06/23/15 -  Lucila Flannery, OT  Occupational Therapist OT    ST 02/20/17 -  VANNESSA Menchaca Occupational Therapist OT    JR 02/06/17 -  Myles Ramírez, RN Registered Nurse Nurse                  OT Recommendation and Plan  Anticipated Equipment Needs At Discharge:  (TBD)  Anticipated Discharge Disposition: inpatient rehabilitation facility  Planned Therapy Interventions: ADL retraining, balance training, bed mobility training, home exercise program, strengthening, transfer training  Therapy Frequency: daily  Plan of Care Review  Plan Of Care Reviewed With: patient  Progress: progress toward functional goals is gradual  Outcome Summary/Follow up Plan: Pt min a sit to stand and stand to sit using RW.  Pt declined further mobility.  Pt max A to attempt to doff socks limited by pain and decreased flexibility, and impaired vision limiting use of AE.  Pt with good effort with UE strengthening.         Outcome Measures       02/02/18 1008 02/01/18 1105 01/31/18 0829    How much help from another person do you currently need...    Turning from your back to your side while in flat bed without using bedrails?  4  -BD     Moving from lying on back to sitting on the side of a flat bed without bedrails?  3  -BD     Moving to and from a bed to a chair (including a wheelchair)?  2  -BD     Standing up from a chair using your arms (e.g., wheelchair, bedside chair)?  3  -BD     Climbing 3-5 steps with a railing?  1  -BD     To walk in hospital room?  2  -BD     AM-PAC 6 Clicks Score  15  -BD     How much help from another is currently needed...    Putting on and taking off regular lower body clothing? 1  -AC  1  -ST    Bathing (including washing, rinsing, and drying) 2  -AC  1  -ST    Toileting (which includes using toilet bed pan or urinal) 1  -AC  1  -ST    Putting on and taking off regular upper body clothing 3  -AC  3  -ST    Taking care of personal grooming (such as brushing teeth) 3  -AC  3  -ST    Eating meals 3  -AC  3  -ST    Score 13  -AC  12  -ST     Functional Assessment    Outcome Measure Options AM-[facility] 6 Clicks Daily Activity (OT)  -AC AM-PAC 6 Clicks Basic Mobility (PT)  -BD       01/31/18 0807          How much help from another person do you currently need...    Turning from your back to your side while in flat bed without using bedrails? 3  -LM      Moving from lying on back to sitting on the side of a flat bed without bedrails? 3  -LM      Moving to and from a bed to a chair (including a wheelchair)? 2  -LM      Standing up from a chair using your arms (e.g., wheelchair, bedside chair)? 2  -LM      Climbing 3-5 steps with a railing? 1  -LM      To walk in hospital room? 1  -LM      AM-PAC 6 Clicks Score 12  -LM      Functional Assessment    Outcome Measure Options AM-PAC 6 Clicks Basic Mobility (PT)  -LM        User Key  (r) = Recorded By, (t) = Taken By, (c) = Cosigned By    Initials Name Provider Type    AC Lucila Flannery, OT Occupational Therapist     Jeana Peralta, OTR Occupational Therapist    YRIS Morse, PT Physical Therapist    LM Adriana Wilson, PT Physical Therapist           Time Calculation:         Time Calculation- OT       02/02/18 1057          Time Calculation- OT    OT Start Time 1008  -      Total Timed Code Minutes- OT 23 minute(s)  -      OT Received On 02/02/18  -      OT Goal Re-Cert Due Date 02/09/18  -        User Key  (r) = Recorded By, (t) = Taken By, (c) = Cosigned By    Initials Name Provider Type     Lucila Flannery, OT Occupational Therapist           Therapy Charges for Today     Code Description Service Date Service Provider Modifiers Qty    [id number]  OT THERAPEUTIC ACT EA 15 MIN 2/2/2018 Lucila Flannery OT GO 2               Lucila Flannery OT  2/2/2018

## 2018-02-02 NOTE — PLAN OF CARE
Problem: Patient Care Overview (Adult)  Goal: Plan of Care Review  Outcome: Ongoing (interventions implemented as appropriate)   02/02/18 9822   Patient Care Overview   Progress no change   Outcome Evaluation   Outcome Summary/Follow up Plan pt has been in better mood during night; vss; cooperative; straight cath'd x1; alert and oriented-however said he saw his dead family members last night; dressing with scant drainage on aquacell dressing.    Coping/Psychosocial Response Interventions   Plan Of Care Reviewed With patient     Goal: Adult Individualization and Mutuality  Outcome: Ongoing (interventions implemented as appropriate)    Goal: Discharge Needs Assessment  Outcome: Ongoing (interventions implemented as appropriate)      Problem: Perioperative Period (Adult)  Goal: Signs and Symptoms of Listed Potential Problems Will be Absent or Manageable (Perioperative Period)  Outcome: Ongoing (interventions implemented as appropriate)      Problem: Orthopaedic Fracture (Adult)  Goal: Signs and Symptoms of Listed Potential Problems Will be Absent or Manageable (Orthopaedic Fracture)  Outcome: Ongoing (interventions implemented as appropriate)      Problem: Fall Risk (Adult)  Goal: Identify Related Risk Factors and Signs and Symptoms  Outcome: Ongoing (interventions implemented as appropriate)    Goal: Absence of Falls  Outcome: Ongoing (interventions implemented as appropriate)

## 2018-02-03 VITALS
RESPIRATION RATE: 16 BRPM | WEIGHT: 143 LBS | BODY MASS INDEX: 21.67 KG/M2 | TEMPERATURE: 98.9 F | SYSTOLIC BLOOD PRESSURE: 164 MMHG | DIASTOLIC BLOOD PRESSURE: 76 MMHG | HEART RATE: 62 BPM | HEIGHT: 68 IN | OXYGEN SATURATION: 97 %

## 2018-02-03 LAB
ANION GAP SERPL CALCULATED.3IONS-SCNC: 9 MMOL/L (ref 3–11)
BUN BLD-MCNC: 34 MG/DL (ref 9–23)
BUN/CREAT SERPL: 24.3 (ref 7–25)
CALCIUM SPEC-SCNC: 8.2 MG/DL (ref 8.7–10.4)
CHLORIDE SERPL-SCNC: 96 MMOL/L (ref 99–109)
CO2 SERPL-SCNC: 25 MMOL/L (ref 20–31)
CREAT BLD-MCNC: 1.4 MG/DL (ref 0.6–1.3)
DEPRECATED RDW RBC AUTO: 44.4 FL (ref 37–54)
ERYTHROCYTE [DISTWIDTH] IN BLOOD BY AUTOMATED COUNT: 14.1 % (ref 11.3–14.5)
GFR SERPL CREATININE-BSD FRML MDRD: 50 ML/MIN/1.73
GLUCOSE BLD-MCNC: 303 MG/DL (ref 70–100)
GLUCOSE BLDC GLUCOMTR-MCNC: 207 MG/DL (ref 70–130)
GLUCOSE BLDC GLUCOMTR-MCNC: 350 MG/DL (ref 70–130)
HCT VFR BLD AUTO: 23.9 % (ref 38.9–50.9)
HGB BLD-MCNC: 7.9 G/DL (ref 13.1–17.5)
MCH RBC QN AUTO: 28.4 PG (ref 27–31)
MCHC RBC AUTO-ENTMCNC: 33.1 G/DL (ref 32–36)
MCV RBC AUTO: 86 FL (ref 80–99)
PLATELET # BLD AUTO: 211 10*3/MM3 (ref 150–450)
PMV BLD AUTO: 11.2 FL (ref 6–12)
POTASSIUM BLD-SCNC: 5.2 MMOL/L (ref 3.5–5.5)
RBC # BLD AUTO: 2.78 10*6/MM3 (ref 4.2–5.76)
SODIUM BLD-SCNC: 130 MMOL/L (ref 132–146)
WBC NRBC COR # BLD: 6.18 10*3/MM3 (ref 3.5–10.8)

## 2018-02-03 PROCEDURE — 25010000002 ENOXAPARIN PER 10 MG: Performed by: ORTHOPAEDIC SURGERY

## 2018-02-03 PROCEDURE — 99239 HOSP IP/OBS DSCHRG MGMT >30: CPT | Performed by: NURSE PRACTITIONER

## 2018-02-03 PROCEDURE — 82962 GLUCOSE BLOOD TEST: CPT

## 2018-02-03 PROCEDURE — 99024 POSTOP FOLLOW-UP VISIT: CPT | Performed by: ORTHOPAEDIC SURGERY

## 2018-02-03 PROCEDURE — 63710000001 INSULIN DETEMIR PER 5 UNITS: Performed by: PHYSICIAN ASSISTANT

## 2018-02-03 PROCEDURE — 94640 AIRWAY INHALATION TREATMENT: CPT

## 2018-02-03 PROCEDURE — 85027 COMPLETE CBC AUTOMATED: CPT | Performed by: PHYSICIAN ASSISTANT

## 2018-02-03 PROCEDURE — 80048 BASIC METABOLIC PNL TOTAL CA: CPT | Performed by: PHYSICIAN ASSISTANT

## 2018-02-03 RX ORDER — FUROSEMIDE 20 MG/1
20 TABLET ORAL DAILY
Start: 2018-02-03

## 2018-02-03 RX ORDER — TAMSULOSIN HYDROCHLORIDE 0.4 MG/1
0.4 CAPSULE ORAL DAILY
Qty: 30 CAPSULE
Start: 2018-02-03

## 2018-02-03 RX ORDER — NICOTINE 21 MG/24HR
1 PATCH, TRANSDERMAL 24 HOURS TRANSDERMAL
Start: 2018-02-03 | End: 2018-05-18

## 2018-02-03 RX ORDER — LANOLIN ALCOHOL/MO/W.PET/CERES
1000 CREAM (GRAM) TOPICAL DAILY
Status: DISCONTINUED | OUTPATIENT
Start: 2018-02-03 | End: 2018-02-03 | Stop reason: HOSPADM

## 2018-02-03 RX ADMIN — HYDROCODONE BITARTRATE AND ACETAMINOPHEN 1 TABLET: 5; 325 TABLET ORAL at 04:32

## 2018-02-03 RX ADMIN — CETIRIZINE HYDROCHLORIDE 10 MG: 10 TABLET, FILM COATED ORAL at 08:34

## 2018-02-03 RX ADMIN — CYANOCOBALAMIN TAB 1000 MCG 1000 MCG: 1000 TAB at 08:55

## 2018-02-03 RX ADMIN — PENTOXIFYLLINE 400 MG: 400 TABLET, FILM COATED, EXTENDED RELEASE ORAL at 12:08

## 2018-02-03 RX ADMIN — DORZOLAMIDE HYDROCHLORIDE 1 DROP: 20 SOLUTION OPHTHALMIC at 05:42

## 2018-02-03 RX ADMIN — INSULIN LISPRO 7 UNITS: 100 INJECTION, SOLUTION INTRAVENOUS; SUBCUTANEOUS at 08:35

## 2018-02-03 RX ADMIN — NICOTINE 1 PATCH: 21 PATCH, EXTENDED RELEASE TRANSDERMAL at 08:55

## 2018-02-03 RX ADMIN — FLUDROCORTISONE ACETATE 0.1 MG: 0.1 TABLET ORAL at 08:36

## 2018-02-03 RX ADMIN — FUROSEMIDE 20 MG: 20 TABLET ORAL at 08:34

## 2018-02-03 RX ADMIN — ASPIRIN 81 MG: 81 TABLET, COATED ORAL at 08:34

## 2018-02-03 RX ADMIN — BISOPROLOL FUMARATE 5 MG: 5 TABLET, COATED ORAL at 05:42

## 2018-02-03 RX ADMIN — SERTRALINE HYDROCHLORIDE 100 MG: 100 TABLET ORAL at 08:34

## 2018-02-03 RX ADMIN — LISINOPRIL 10 MG: 10 TABLET ORAL at 05:41

## 2018-02-03 RX ADMIN — PENTOXIFYLLINE 400 MG: 400 TABLET, FILM COATED, EXTENDED RELEASE ORAL at 08:38

## 2018-02-03 RX ADMIN — TAMSULOSIN HYDROCHLORIDE 0.4 MG: 0.4 CAPSULE ORAL at 08:34

## 2018-02-03 RX ADMIN — BUDESONIDE AND FORMOTEROL FUMARATE DIHYDRATE 2 PUFF: 80; 4.5 AEROSOL RESPIRATORY (INHALATION) at 08:02

## 2018-02-03 RX ADMIN — INSULIN LISPRO 4 UNITS: 100 INJECTION, SOLUTION INTRAVENOUS; SUBCUTANEOUS at 12:09

## 2018-02-03 RX ADMIN — BUPROPION HYDROCHLORIDE 150 MG: 150 TABLET, EXTENDED RELEASE ORAL at 08:36

## 2018-02-03 RX ADMIN — INSULIN DETEMIR 10 UNITS: 100 INJECTION, SOLUTION SUBCUTANEOUS at 08:38

## 2018-02-03 RX ADMIN — PANTOPRAZOLE SODIUM 40 MG: 40 TABLET, DELAYED RELEASE ORAL at 05:41

## 2018-02-03 NOTE — PROGRESS NOTES
Continued Stay Note  [facility]     Patient Name: Martin Moctezuma  MRN: [id number]  Today's Date: 2/3/2018    Admit Date: 1/29/2018          Discharge Plan       02/03/18 0901    Case Management/Social Work Plan    Plan [facility]    Patient/Family In Agreement With Plan yes    Additional Comments Plan is an acute rehab bed on the spinal cord unit at [facility] today.  Family to transport.  Nurse to call report to [phone].  CM will fax transfer summary to [phone].  PLease place a copy of transfer summary and any scripts in packet to be sent with the patient to the facility.  Tonya Vicente RN [phone]              Discharge Codes     None        Expected Discharge Date and Time     Expected Discharge Date Expected Discharge Time    Feb 3, 2018             Tonya Vicente RN

## 2018-02-03 NOTE — PLAN OF CARE
Problem: Patient Care Overview (Adult)  Goal: Plan of Care Review  Outcome: Ongoing (interventions implemented as appropriate)   02/03/18 0250   Patient Care Overview   Progress no change   Outcome Evaluation   Outcome Summary/Follow up Plan pt also refused IV and IV fluids   Coping/Psychosocial Response Interventions   Plan Of Care Reviewed With patient

## 2018-02-03 NOTE — PLAN OF CARE
Problem: Patient Care Overview (Adult)  Goal: Plan of Care Review  Outcome: Ongoing (interventions implemented as appropriate)   02/03/18 0244   Patient Care Overview   Progress no change   Outcome Evaluation   Outcome Summary/Follow up Plan vss; pt refused night time meds except levemir; distrustful and agitated; finally more content in chair; discharge plan for today to [facility] [place].    Coping/Psychosocial Response Interventions   Plan Of Care Reviewed With patient     Goal: Adult Individualization and Mutuality  Outcome: Ongoing (interventions implemented as appropriate)    Goal: Discharge Needs Assessment  Outcome: Ongoing (interventions implemented as appropriate)      Problem: Perioperative Period (Adult)  Goal: Signs and Symptoms of Listed Potential Problems Will be Absent or Manageable (Perioperative Period)  Outcome: Ongoing (interventions implemented as appropriate)      Problem: Orthopaedic Fracture (Adult)  Goal: Signs and Symptoms of Listed Potential Problems Will be Absent or Manageable (Orthopaedic Fracture)  Outcome: Ongoing (interventions implemented as appropriate)      Problem: Fall Risk (Adult)  Goal: Identify Related Risk Factors and Signs and Symptoms  Outcome: Ongoing (interventions implemented as appropriate)    Goal: Absence of Falls  Outcome: Ongoing (interventions implemented as appropriate)

## 2018-02-03 NOTE — DISCHARGE INSTRUCTIONS
PLEASE REMOVE STAPLES 12 - 14 DAYS AFTER SURGERY (FEBRUARY 10- February 12)      AQUACEL DRESSING TO REMAIN IN PLACE UNTIL STAPLES ARE REMOVED, THEN PLACE A DRY DRESSING AND KEEP DRESSING IN PLACE AT ALL TIMES.      DRAW BMP IN 2 DAYS.

## 2018-02-03 NOTE — PROGRESS NOTES
"Orthopaedic Surgery Progress Note      LOS: 5 days   Patient Care Team:  Provider Not In System as PCP - General    POD 5    Subjective     Interval History:   Patient resting this morning.  Scheduled to go to [facility] later today.    Objective     Vital Signs:  Temp (24hrs), Av.6 °F (37 °C), Min:98.3 °F (36.8 °C), Max:98.9 °F (37.2 °C)    /76 (BP Location: Right arm, Patient Position: Lying)  Pulse 62  Temp 98.9 °F (37.2 °C) (Oral)   Resp 16  Ht 172.7 cm (68\")  Wt 64.9 kg (143 lb)  SpO2 97%  BMI 21.74 kg/m2    Labs:  Lab Results (last 24 hours)     Procedure Component Value Units Date/Time    POC Glucose Once [[id number]]  (Abnormal) Collected:  18 1115    Specimen:  Blood Updated:  18 1120     Glucose 185 (H) mg/dL     Narrative:       Meter: [id number] : [id number] Rm Fernandez    Iron Profile [[id number]]  (Abnormal) Collected:  18    Specimen:  Blood Updated:  18 1304     Iron 26 (L) mcg/dL      TIBC 216 (L) mcg/dL      Iron Saturation 12 (L) %     Ferritin [[id number]]  (Normal) Collected:  18    Specimen:  Blood Updated:  18 1304     Ferritin 232.00 ng/mL     Vitamin B12 [[id number]]  (Normal) Collected:  18    Specimen:  Blood Updated:  18 1304     Vitamin B-12 230 pg/mL     Folate [[id number]]  (Normal) Collected:  18    Specimen:  Blood Updated:  18 1304     Folate 12.14 ng/mL       Results may be falsely increased if patient taking Biotin.       Narrative:         Folate Reference Ranges:    Deficient:            Less than 1.2 ng/mL  Indeterminant:        1.2-3.1 ng/mL  Normal:               3.2-20.0 ng/mL    POC Glucose Once [[id number]]  (Abnormal) Collected:  18 1307    Specimen:  Blood Updated:  18 1308     Glucose 172 (H) mg/dL     Narrative:       Verify with Lab Meter: [id number] : [id number] Kaley Brown    POC Glucose Once [[id number]]  (Abnormal) " Collected:  02/02/18 1605    Specimen:  Blood Updated:  02/02/18 1606     Glucose 50 (L) mg/dL     Narrative:       Meter: [id number] : [id number] Roche Stephanie    POC Glucose Once [[id number]]  (Normal) Collected:  02/02/18 1615    Specimen:  Blood Updated:  02/02/18 1616     Glucose 127 mg/dL     Narrative:       Meter: [id number] : [id number] Kaley Brown    Urinalysis With / Culture If Indicated - Urine, Clean Catch [[id number]]  (Abnormal) Collected:  02/02/18 1525    Specimen:  Urine from Urine, Clean Catch Updated:  02/02/18 1745     Color, UA Yellow     Appearance, UA Clear     pH, UA 5.5     Specific Gravity, UA 1.016     Glucose, UA Negative     Ketones, UA Negative     Bilirubin, UA Negative     Blood, UA Negative     Protein, UA Negative     Leuk Esterase, UA Small (1+) (A)     Nitrite, UA Negative     Urobilinogen, UA 1.0 E.U./dL    Urinalysis, Microscopic Only - Urine, Clean Catch [[id number]]  (Abnormal) Collected:  02/02/18 1525    Specimen:  Urine from Urine, Clean Catch Updated:  02/02/18 1806     RBC, UA None Seen /HPF      WBC, UA 6-12 (A) /HPF      Bacteria, UA Trace /HPF      Squamous Epithelial Cells, UA 3-6 (A) /HPF      Hyaline Casts, UA 0-6 /LPF      Methodology Manual Light Microscopy    POC Glucose Once [[id number]]  (Abnormal) Collected:  02/02/18 2019    Specimen:  Blood Updated:  02/02/18 2031     Glucose 292 (H) mg/dL     Narrative:       Meter: [id number] : [id number] Meena Bernard    CBC (No Diff) [[id number]]  (Abnormal) Collected:  02/03/18 0548    Specimen:  Blood Updated:  02/03/18 0640     WBC 6.18 10*3/mm3      RBC 2.78 (L) 10*6/mm3      Hemoglobin 7.9 (L) g/dL      Hematocrit 23.9 (L) %      MCV 86.0 fL      MCH 28.4 pg      MCHC 33.1 g/dL      RDW 14.1 %      RDW-SD 44.4 fl      MPV 11.2 fL      Platelets 211 10*3/mm3     Basic Metabolic Panel [[id number]]  (Abnormal) Collected:  02/03/18 0548    Specimen:  Blood Updated:  02/03/18 0705     Glucose 303 (H) mg/dL       BUN 34 (H) mg/dL      Creatinine 1.40 (H) mg/dL      Sodium 130 (L) mmol/L      Potassium 5.2 mmol/L      Chloride 96 (L) mmol/L      CO2 25.0 mmol/L      Calcium 8.2 (L) mg/dL      eGFR Non African Amer 50 (L) mL/min/1.73      BUN/Creatinine Ratio 24.3     Anion Gap 9.0 mmol/L     Narrative:       [employer] Guidelines    Stage     Description        GFR  1         Normal or High     90+  2         Mild decrease      60-89  3         Moderate decrease  30-59  4         Severe decrease    15-29  5         Kidney failure     <15    POC Glucose Once [[id number]]  (Abnormal) Collected:  02/03/18 0750    Specimen:  Blood Updated:  02/03/18 0752     Glucose 350 (H) mg/dL     Narrative:       Meter: [id number] : [id number] Derek Wang    Urine Culture - Urine, Urine, Clean Catch [[id number]]  (Normal) Collected:  02/02/18 1525    Specimen:  Urine from Urine, Clean Catch Updated:  02/03/18 0810     Urine Culture No growth at 24 hours          Physical Exam:  Surgical dressings are in place  Calf is soft and nontender    Assessment/Plan     POD #5 s/p trochanteric nail for right proximal femur fracture      PT and OT--patient may be weightbearing as tolerated on the right lower extremity at all times.      SCD's for DVTpx.  Pt refusing chemical dvt px        Discontinue fascia iliaca block prior to discharge.      Acute blood loss anemia--Hct currently 24 and trending upward.  Patient asymptomatic.       D/c planning--patient being transferred [facility] later today.      Bowel protocol      Urinary retention improved      Upon discharge, patient will need staples removed 12-14 days after surgery.  Continue Aquacel dressings until its time to remove the staples.  After staple removal, patient will need a dry dressing over his incisions at all times.  Continue Lovenox for 6 weeks total.  Follow-up with me in 3 weeks approximately.  Patient may weight-bear as tolerated on the  right lower extremity.  No hip precautions necessary.  Titus Woody MD  02/03/18  10:28 AM

## 2018-02-03 NOTE — DISCHARGE PLACEMENT REQUEST
"Emmanuel Shah (72 y.o. Male)  Tonya Vicente, RN  [phone]     Date of Birth Social Security Number Address Home Phone MRN    1945  [facility]  [address] [place], [place] [id number] [phone] [id number]    Christianity Marital Status          Rastafarian        Admission Date Admission Type Admitting Provider Attending Provider Department, Room/Bed    1/29/18 Emergency Alissa Dong II, Alissa Stockton II, DO [facility] 3G, S360/1    Discharge Date Discharge Disposition Discharge Destination         Rehab Facility or Unit (DC - External)             Attending Provider: Alissa Dong II, DO     Allergies:  No Known Allergies    Isolation:  None   Infection:  None   Code Status:  Conditional    Ht:  172.7 cm (68\")   Wt:  64.9 kg (143 lb)    Admission Cmt:  None   Principal Problem:  Closed fracture of right hip [S72.001A]                 Active Insurance as of 1/29/2018     Primary Coverage     Payor Plan Insurance Group Employer/Plan Group    MEDICARE MEDICARE A & B      Payor Plan Address Payor Plan Phone Number Effective From Effective To    [address] [phone] 12/1/1990     [place] [address]       Subscriber Name Subscriber Birth Date Member ID       EMMANUEL SHAH 1945 [id number]           Secondary Coverage     Payor Plan Insurance Group Employer/Plan Group    AAR MED SUPP AAR HE[employer] C[employer] OPTIONS      Payor Plan Address Payor Plan Phone Number Effective From Effective To    [facility] [phone] 1/1/2016     [address]       [place] [address]       Subscriber Name Subscriber Birth Date Member ID       EMMANUEL SHAH 1945 [id number]                 Emergency Contacts      (Rel.) Home Phone Work Phone Mobile Phone    Connie Shah (Spouse) [phone] -- [phone]                 Discharge Summary      DARNELL Crum at 2/3/2018  8:31 AM              [facility] Medicine Services  DISCHARGE " SUMMARY    Patient Name: Martin Moctezuma  : 1945  MRN: [id number]    Date of Admission: 2018  Date of Discharge:  2018  Primary Care Physician: Provider Not In System    Consults     Date and Time Order Name Status Description    2018 1217 Inpatient Consult to Orthopedic Surgery Completed         Hospital Course     Presenting Problem:   Closed fracture of right hip, initial encounter [S72.001A]    Active Hospital Problems (** Indicates Principal Problem)    Diagnosis Date Noted   • **Closed fracture of right hip [S72.001A] 2018   • Urinary retention [R33.9] 2018   • Tobacco dependence [F17.200] 2018   • Anemia [D64.9] 10/20/2016   • Coronary artery disease [I25.10] 10/20/2016   • Diabetes mellitus [E11.9] 10/20/2016   • Hypertension [I10] 10/20/2016      Resolved Hospital Problems    Diagnosis Date Noted Date Resolved   No resolved problems to display.          Hospital Course:  Martin Moctezuma is a 72 y.o. male history of CAD status post CABG, diabetes, hypertension, anemia, and vision impairment who presented to the emergency department after he tripped and fell at home.  He was found have a closed right intertrochanteric femur fracture.  On 18 he underwent treatment of the right intertrochanteric femur fracture using a intramedullary hip screw by Dr. Magdaleno.  He tolerated procedure well and is having typical postoperative course. Dr. Woody has recommended weightbearing as tolerated.  He needs to follow-up with Dr. Magdaleno in 3 weeks.  He also recommended 6 weeks of Lovenox (DVT prophylaxis dosing).  Unfortunately patient has been refusing.  Patient is worried about retinal hemorrhage.  Patient is aware of risk of DVT/PE and adamantly refuses. Patient will need staples removed 12-14 days after surgery.  Continue Aquacel dressings until its time to remove the staples.  After staple removal, patient will need a dry dressing over his incisions at all times.      He's had a mild increase of his creatinine (1.4) from his baseline of 1.1 last year.  Likely prerenal.  He did have trial of IV fluids which didn't change his creatinine much.  Diuretics and ACE inhibitor have continued and creatinine has remained stable throughout hospitalization.  Suspect this is his new baseline.  I did decrease his Lasix to once daily versus twice a day. He does have diastolic dysfunction with preserved ejection fraction.     He does have acute on chronic anemia.  Globes and is stable.  B12 borderline low.  Start oral replacement.  Recheck CBC in a few days.     Concerning his diabetes he is somewhat brittle.  He did have some low blood sugar yesterday (50's) and due to that I'm not being very aggressive with his insulin regimen at this time.  Will need continued titration.    He did experience urinary retention this admission that resolved with addition of Flomax.  He has mild hyponatremia with a normal serum osmolality.  Repeat BMP in a few days.     PT/OT have seen and recommend inpatient rehab. This will take place at [facility].     Physical therapy Physical therapy and occupational therapy been following along and recommended inpatient rehabilitation.  This will take place at [facility]  Procedure(s):  HIP TROCANTERIC NAILING WITH INTRAMEDULLARY HIP SCREW       Day of Discharge     HPI:   Reports normal urination, denies retention, normal BM, pain is relatively controlled, sometimes the pain medication is a hit or miss    Review of Systems  Gen- No fevers, chills  CV- No chest pain, palpitations  Resp- No cough, dyspnea  GI- No N/V/D, abd pain      Otherwise ROS is negative except as mentioned in the HPI.    Vital Signs:   Temp:  [98.3 °F (36.8 °C)-98.9 °F (37.2 °C)] 98.9 °F (37.2 °C)  Heart Rate:  [62-70] 62  Resp:  [16-22] 16  BP: (101-178)/(47-79) 164/76     Physical Exam:  Gen-no acute distress, sitting up in chair  CV-RRR, S1 S2 normal, no m/r/g  Resp-CTAB,  normal WOB  Abd-soft, NT, ND, +BS  Ext-no edema, PPP  Neuro-A&Ox3, no focal deficits  Psych-appropriate mood      Pertinent  and/or Most Recent Results       Results from last 7 days  Lab Units 02/03/18  0548 02/02/18  0513 02/01/18  0530 01/31/18  0518 01/30/18  0625 01/29/18  1021   WBC 10*3/mm3 6.18  --   --  10.00 9.69 9.61   HEMOGLOBIN g/dL 7.9* 7.4* 8.0* 8.4* 8.7* 9.9*   HEMATOCRIT % 23.9* 22.9* 24.5* 26.3* 26.8* 29.7*   PLATELETS 10*3/mm3 211  --   --  145* 143* 160   SODIUM mmol/L 130* 131* 128* 132 131* 134   POTASSIUM mmol/L 5.2 4.4 4.9 5.1 4.7 4.5   CHLORIDE mmol/L 96* 100 100 107 103 104   CO2 mmol/L 25.0 25.0 20.0 21.0 25.0 25.0   BUN mg/dL 34* 31* 26* 26* 25* 26*   CREATININE mg/dL 1.40* 1.40* 1.40* 1.30 1.30 1.30   GLUCOSE mg/dL 303* 65* 310* 202* 148* 203*   CALCIUM mg/dL 8.2* 8.2* 8.0* 8.2* 7.6* 8.9       Results from last 7 days  Lab Units 01/29/18  1021   PROTIME Seconds 10.7   INR  0.98         Results from last 7 days  Lab Units 01/29/18  1021   HEMOGLOBIN A1C % 8.80*     Brief Urine Lab Results  (Last result in the past 365 days)      Color   Clarity   Blood   Leuk Est   Nitrite   Protein   CREAT   Urine HCG        02/02/18 1525 Yellow Clear Negative Small (1+)(A) Negative Negative               Microbiology Results Abnormal     Procedure Component Value - Date/Time    Urine Culture - Urine, Urine, Clean Catch [[id number]]  (Normal) Collected:  02/02/18 1525    Lab Status:  Preliminary result Specimen:  Urine from Urine, Clean Catch Updated:  02/03/18 0810     Urine Culture No growth at 24 hours          Imaging Results (all)     Procedure Component Value Units Date/Time    FL > 1 Hour [[id number]] Resulted:  01/29/18 1512     Updated:  01/29/18 1512    Narrative:       This procedure was auto-finalized with no dictation required.    XR Pelvis 1 or 2 View [[id number]] Collected:  01/29/18 1053     Updated:  01/29/18 1513    Narrative:       EXAMINATION: XR FEMUR 2 VW, RIGHT-, XR PELVIS 1 OR 2  VW-01/29/2018:      INDICATION: Fall/pain.      COMPARISON: NONE.     FINDINGS: Two views of the right femur and single view of the pelvis  reveals a nondisplaced intertrochanteric fracture of the right femur.  There is vascular calcification seen within the pelvic vessels.  Osteopenia of the bony structures. Spurring of the acetabulum. Mild  joint space narrowing seen of the right hip. Degenerative changes seen  within the lower lumbar spine and sacroiliac joints bilaterally. The  remainder of the femur is grossly unremarkable. Osteopenia identified of  the bony structures. Vascular calcification seen within the vessels of  the lower extremity. Surgical clips seen in the soft tissues.           Impression:       Osteopenia identified of the bony structures with  nondisplaced fracture seen of the intertrochanteric portion of the right  femur.     D:  01/29/2018  E:  01/29/2018     This report was finalized on 1/29/2018 3:11 PM by Dr. Lazara Cordero MD.       XR Femur 2 View Right [[id number]] Collected:  01/29/18 1053     Updated:  01/29/18 1513    Narrative:       EXAMINATION: XR FEMUR 2 VW, RIGHT-, XR PELVIS 1 OR 2 VW-01/29/2018:      INDICATION: Fall/pain.      COMPARISON: NONE.     FINDINGS: Two views of the right femur and single view of the pelvis  reveals a nondisplaced intertrochanteric fracture of the right femur.  There is vascular calcification seen within the pelvic vessels.  Osteopenia of the bony structures. Spurring of the acetabulum. Mild  joint space narrowing seen of the right hip. Degenerative changes seen  within the lower lumbar spine and sacroiliac joints bilaterally. The  remainder of the femur is grossly unremarkable. Osteopenia identified of  the bony structures. Vascular calcification seen within the vessels of  the lower extremity. Surgical clips seen in the soft tissues.           Impression:       Osteopenia identified of the bony structures with  nondisplaced fracture seen of the  intertrochanteric portion of the right  femur.     D:  01/29/2018  E:  01/29/2018     This report was finalized on 1/29/2018 3:11 PM by Dr. Lazara Cordero MD.       XR Pelvis 1 or 2 View [[id number]] Collected:  01/30/18 0957     Updated:  01/30/18 1400    Narrative:       EXAMINATION: XR PELVIS 1 OR 2 VW-      INDICATION: Post-Op Hip Arthroplasty; S72.001A-Fracture of unspecified  part of neck of right femur, initial encounter for closed fracture;  S72.141A-Displaced intertrochanteric fracture of right femur, initial  encounter for closed fracture.      COMPARISON: 01/29/2018.     FINDINGS: There is a sliding nail prosthesis stabilizing an  intertrochanteric fracture of the right femur. The position appears  excellent in the AP projection. The limited view of the bony pelvis is  normal.           Impression:       Expected postoperative findings.     D:  01/30/2018  E:  01/30/2018     This report was finalized on 1/30/2018 1:58 PM by Dr. Abilio Dexter MD.       XR Hip 1 View Without Pelvis Right (Surgery Only) [[id number]] Collected:  01/30/18 0858     Updated:  01/30/18 1400    Narrative:       EXAMINATION: XR HIP 1 VIEW WO PELVIS RIGHT-      INDICATION: Postop hip arthroplasty; S72.001A-Fracture of unspecified  part of neck of right femur, initial encounter for closed fracture;  S72.141A-Displaced intertrochanteric fracture of right femur, initial  encounter for closed fracture.      COMPARISON: None.     FINDINGS: Single images of the right hip demonstrates a sliding nail  prosthesis stabilizing an intertrochanteric fracture of the right femur.            Impression:       Expected postoperative findings.     D:  01/30/2018  E:  01/30/2018     This report was finalized on 1/30/2018 1:58 PM by Dr. Abilio Dexter MD.       FL C Arm During Surgery [[id number]] Collected:  01/30/18 0906     Updated:  01/30/18 1401    Narrative:       EXAMINATION: FLUOROSCOPY C-ARM DURING SURGERY-01/29/2018:     INDICATION: RIGHT  TROCHANTERIC NAIL; S72.001A-Fracture of unspecified  part of neck of right femur, initial encounter for closed fracture;  S72.141A-Displaced intertrochanteric fracture of right femur, initial  encounter for closed fracture.         TECHNIQUE: 52 seconds of fluoroscopic time was used to assist in this  procedure.     COMPARISON: NONE.     FINDINGS:  Nine intraoperative images were obtained during placement of  a sliding nail prosthesis.       Impression:       Fluoroscopy and intraoperative imaging was utilized to  assist in this procedure.     D:  01/30/2018  E:  01/30/2018            This report was finalized on 1/30/2018 1:58 PM by Dr. Abilio Dexter MD.                Order Current Status    Urine Culture - Urine, Urine, Clean Catch Preliminary result        Discharge Details      Martin Moctezuma WOLFGANG   Home Medication Instructions GIL:[id number]    Printed on:02/03/18 0831   Medication Information                      aspirin 81 MG EC tablet  Take 81 mg by mouth Daily.             bisoprolol (ZEBeta) 5 MG tablet  Take 5 mg by mouth Daily.             buPROPion SR (WELLBUTRIN SR) 150 MG 12 hr tablet  Take 150 mg by mouth 2 (two) times a day.             cholecalciferol (VITAMIN D3) 1000 UNITS tablet  Take 1,000 Units by mouth Daily.             docusate sodium (COLACE) 100 MG capsule  Take 1 capsule by mouth 2 (Two) Times a Day As Needed for constipation.             dorzolamide (TRUSOPT) 2 % ophthalmic solution  1 drop 3 (Three) Times a Day.             enoxaparin (LOVENOX) 40 MG/0.4ML solution syringe  Inject 0.4 mL under the skin Daily. X 6 weeks             fludrocortisone 0.1 MG tablet  Take 0.1 mg by mouth Daily.             fluticasone-salmeterol (ADVAIR) 250-50 MCG/DOSE DISKUS  Inhale 2 (Two) Times a Day.             furosemide (LASIX) 20 MG tablet  Take 1 tablet by mouth Daily.             HYDROcodone-acetaminophen (NORCO) 5-325 MG per tablet  Take 1 tablet by mouth Every 4 (Four) Hours As Needed for Severe  Pain (7-10).             insulin detemir (LEVEMIR) 100 UNIT/ML injection  Inject 4 Units under the skin Every Night.             insulin detemir (LEVEMIR) 100 UNIT/ML injection  Inject 10 Units under the skin in the AM.             insulin lispro (humaLOG) 100 UNIT/ML injection  Inject 5 Units under the skin 3 (Three) Times a Day With Meals.             insulin lispro (humaLOG) 100 UNIT/ML injection  Inject 0-9 Units under the skin 4 (Four) Times a Day With Meals & at Bedtime.             latanoprost (XALATAN) 0.005 % ophthalmic solution  1 drop Every Night.             lisinopril (PRINIVIL,ZESTRIL) 10 MG tablet  Take 10 mg by mouth daily.             Loratadine 10 MG capsule  Take  by mouth.             nicotine (NICODERM CQ) 21 MG/24HR patch  Place 1 patch on the skin Daily.             nitroglycerin (NITROSTAT) 0.4 MG SL tablet  Place 0.4 mg under the tongue Every 5 (Five) Minutes As Needed for Chest Pain. Take no more than 3 doses in 15 minutes.             pantoprazole (PROTONIX) 40 MG EC tablet  Take 40 mg by mouth Daily.             Pediatric Multivitamins-Iron (FLINTSTONES PLUS IRON) chewable tablet  Chew 1 tablet Daily.             pentoxifylline (TRENtal) 400 MG CR tablet  Take 400 mg by mouth 3 (Three) Times a Day With Meals.             polyethylene glycol (MIRALAX) packet  Take 17 g by mouth Daily.             rosuvastatin (CRESTOR) 20 MG tablet  Take 40 mg by mouth Daily.             sertraline (ZOLOFT) 100 MG tablet  Take 100 mg by mouth daily.             tamsulosin (FLOMAX) 0.4 MG capsule 24 hr capsule  Take 1 capsule by mouth Daily.             vitamin B-12 (VITAMIN B-12) 1000 MCG tablet  Take 1 tablet by mouth Daily.                   Discharge Disposition:  [facility]    Discharge Diet: cardiac, diabetic      Discharge Activity: weight bearing as tolerated, no hip precautions necessary      Future Appointments  Date Time Provider Department Center   2/22/2018 11:20 AM Titus Woody,  MD [facility] None       Additional Instructions for the Follow-ups that You Need to Schedule     Discharge Follow-up with Specialty: Dr. Díaz in 3 weeks    As directed    Specialty:  Dr. Díaz in 3 weeks                     Time Spent on Discharge:  45 minutes    DARNELL Crum  02/03/18  8:31 AM         Electronically signed by DARNELL Crum at 2/3/2018  8:47 AM

## 2018-02-03 NOTE — DISCHARGE SUMMARY
[facility] Medicine Services  DISCHARGE SUMMARY    Patient Name: Martin Moctezuma  : 1945  MRN: [id number]    Date of Admission: 2018  Date of Discharge:  2018  Primary Care Physician: Provider Not In System    Consults     Date and Time Order Name Status Description    2018 1217 Inpatient Consult to Orthopedic Surgery Completed         Hospital Course     Presenting Problem:   Closed fracture of right hip, initial encounter [S72.001A]    Active Hospital Problems (** Indicates Principal Problem)    Diagnosis Date Noted   • **Closed fracture of right hip [S72.001A] 2018   • Urinary retention [R33.9] 2018   • Tobacco dependence [F17.200] 2018   • Anemia [D64.9] 10/20/2016   • Coronary artery disease [I25.10] 10/20/2016   • Diabetes mellitus [E11.9] 10/20/2016   • Hypertension [I10] 10/20/2016      Resolved Hospital Problems    Diagnosis Date Noted Date Resolved   No resolved problems to display.          Hospital Course:  Martin Moctezuma is a 72 y.o. male history of CAD status post CABG, diabetes, hypertension, anemia, and vision impairment who presented to the emergency department after he tripped and fell at home.  He was found have a closed right intertrochanteric femur fracture.  On 18 he underwent treatment of the right intertrochanteric femur fracture using a intramedullary hip screw by Dr. Magdaleno.  He tolerated procedure well and is having typical postoperative course. Dr. Woody has recommended weightbearing as tolerated.  He needs to follow-up with Dr. Magdaleno in 3 weeks.  He also recommended 6 weeks of Lovenox (DVT prophylaxis dosing).  Unfortunately patient has been refusing.  Patient is worried about retinal hemorrhage.  Patient is aware of risk of DVT/PE and adamantly refuses. Patient will need staples removed 12-14 days after surgery.  Continue Aquacel dressings until its time to remove the staples.  After staple removal,  patient will need a dry dressing over his incisions at all times.     He's had a mild increase of his creatinine (1.4) from his baseline of 1.1 last year.  Likely prerenal.  He did have trial of IV fluids which didn't change his creatinine much.  Diuretics and ACE inhibitor have continued and creatinine has remained stable throughout hospitalization.  Suspect this is his new baseline.  I did decrease his Lasix to once daily versus twice a day. He does have diastolic dysfunction with preserved ejection fraction.     He does have acute on chronic anemia.  Globes and is stable.  B12 borderline low.  Start oral replacement.  Recheck CBC in a few days.     Concerning his diabetes he is somewhat brittle.  He did have some low blood sugar yesterday (50's) and due to that I'm not being very aggressive with his insulin regimen at this time.  Will need continued titration.    He did experience urinary retention this admission that resolved with addition of Flomax.  He has mild hyponatremia with a normal serum osmolality.  Repeat BMP in a few days.     PT/OT have seen and recommend inpatient rehab. This will take place at [facility].     Physical therapy Physical therapy and occupational therapy been following along and recommended inpatient rehabilitation.  This will take place at [facility]  Procedure(s):  HIP TROCANTERIC NAILING WITH INTRAMEDULLARY HIP SCREW       Day of Discharge     HPI:   Reports normal urination, denies retention, normal BM, pain is relatively controlled, sometimes the pain medication is a hit or miss    Review of Systems  Gen- No fevers, chills  CV- No chest pain, palpitations  Resp- No cough, dyspnea  GI- No N/V/D, abd pain      Otherwise ROS is negative except as mentioned in the HPI.    Vital Signs:   Temp:  [98.3 °F (36.8 °C)-98.9 °F (37.2 °C)] 98.9 °F (37.2 °C)  Heart Rate:  [62-70] 62  Resp:  [16-22] 16  BP: (101-178)/(47-79) 164/76     Physical Exam:  Gen-no acute distress,  sitting up in chair  CV-RRR, S1 S2 normal, no m/r/g  Resp-CTAB, normal WOB  Abd-soft, NT, ND, +BS  Ext-no edema, PPP  Neuro-A&Ox3, no focal deficits  Psych-appropriate mood      Pertinent  and/or Most Recent Results       Results from last 7 days  Lab Units 02/03/18  0548 02/02/18  0513 02/01/18  0530 01/31/18  0518 01/30/18  0625 01/29/18  1021   WBC 10*3/mm3 6.18  --   --  10.00 9.69 9.61   HEMOGLOBIN g/dL 7.9* 7.4* 8.0* 8.4* 8.7* 9.9*   HEMATOCRIT % 23.9* 22.9* 24.5* 26.3* 26.8* 29.7*   PLATELETS 10*3/mm3 211  --   --  145* 143* 160   SODIUM mmol/L 130* 131* 128* 132 131* 134   POTASSIUM mmol/L 5.2 4.4 4.9 5.1 4.7 4.5   CHLORIDE mmol/L 96* 100 100 107 103 104   CO2 mmol/L 25.0 25.0 20.0 21.0 25.0 25.0   BUN mg/dL 34* 31* 26* 26* 25* 26*   CREATININE mg/dL 1.40* 1.40* 1.40* 1.30 1.30 1.30   GLUCOSE mg/dL 303* 65* 310* 202* 148* 203*   CALCIUM mg/dL 8.2* 8.2* 8.0* 8.2* 7.6* 8.9       Results from last 7 days  Lab Units 01/29/18  1021   PROTIME Seconds 10.7   INR  0.98         Results from last 7 days  Lab Units 01/29/18  1021   HEMOGLOBIN A1C % 8.80*     Brief Urine Lab Results  (Last result in the past 365 days)      Color   Clarity   Blood   Leuk Est   Nitrite   Protein   CREAT   Urine HCG        02/02/18 1525 Yellow Clear Negative Small (1+)(A) Negative Negative               Microbiology Results Abnormal     Procedure Component Value - Date/Time    Urine Culture - Urine, Urine, Clean Catch [[id number]]  (Normal) Collected:  02/02/18 1525    Lab Status:  Preliminary result Specimen:  Urine from Urine, Clean Catch Updated:  02/03/18 0810     Urine Culture No growth at 24 hours          Imaging Results (all)     Procedure Component Value Units Date/Time    FL > 1 Hour [[id number]] Resulted:  01/29/18 1512     Updated:  01/29/18 1512    Narrative:       This procedure was auto-finalized with no dictation required.    XR Pelvis 1 or 2 View [[id number]] Collected:  01/29/18 1053     Updated:  01/29/18 1513    Narrative:        EXAMINATION: XR FEMUR 2 VW, RIGHT-, XR PELVIS 1 OR 2 VW-01/29/2018:      INDICATION: Fall/pain.      COMPARISON: NONE.     FINDINGS: Two views of the right femur and single view of the pelvis  reveals a nondisplaced intertrochanteric fracture of the right femur.  There is vascular calcification seen within the pelvic vessels.  Osteopenia of the bony structures. Spurring of the acetabulum. Mild  joint space narrowing seen of the right hip. Degenerative changes seen  within the lower lumbar spine and sacroiliac joints bilaterally. The  remainder of the femur is grossly unremarkable. Osteopenia identified of  the bony structures. Vascular calcification seen within the vessels of  the lower extremity. Surgical clips seen in the soft tissues.           Impression:       Osteopenia identified of the bony structures with  nondisplaced fracture seen of the intertrochanteric portion of the right  femur.     D:  01/29/2018  E:  01/29/2018     This report was finalized on 1/29/2018 3:11 PM by Dr. Lazara Cordero MD.       XR Femur 2 View Right [[id number]] Collected:  01/29/18 1053     Updated:  01/29/18 1513    Narrative:       EXAMINATION: XR FEMUR 2 VW, RIGHT-, XR PELVIS 1 OR 2 VW-01/29/2018:      INDICATION: Fall/pain.      COMPARISON: NONE.     FINDINGS: Two views of the right femur and single view of the pelvis  reveals a nondisplaced intertrochanteric fracture of the right femur.  There is vascular calcification seen within the pelvic vessels.  Osteopenia of the bony structures. Spurring of the acetabulum. Mild  joint space narrowing seen of the right hip. Degenerative changes seen  within the lower lumbar spine and sacroiliac joints bilaterally. The  remainder of the femur is grossly unremarkable. Osteopenia identified of  the bony structures. Vascular calcification seen within the vessels of  the lower extremity. Surgical clips seen in the soft tissues.           Impression:       Osteopenia identified of the  bony structures with  nondisplaced fracture seen of the intertrochanteric portion of the right  femur.     D:  01/29/2018  E:  01/29/2018     This report was finalized on 1/29/2018 3:11 PM by Dr. Lazara Cordero MD.       XR Pelvis 1 or 2 View [[id number]] Collected:  01/30/18 0957     Updated:  01/30/18 1400    Narrative:       EXAMINATION: XR PELVIS 1 OR 2 VW-      INDICATION: Post-Op Hip Arthroplasty; S72.001A-Fracture of unspecified  part of neck of right femur, initial encounter for closed fracture;  S72.141A-Displaced intertrochanteric fracture of right femur, initial  encounter for closed fracture.      COMPARISON: 01/29/2018.     FINDINGS: There is a sliding nail prosthesis stabilizing an  intertrochanteric fracture of the right femur. The position appears  excellent in the AP projection. The limited view of the bony pelvis is  normal.           Impression:       Expected postoperative findings.     D:  01/30/2018  E:  01/30/2018     This report was finalized on 1/30/2018 1:58 PM by Dr. Abilio Dexter MD.       XR Hip 1 View Without Pelvis Right (Surgery Only) [[id number]] Collected:  01/30/18 0858     Updated:  01/30/18 1400    Narrative:       EXAMINATION: XR HIP 1 VIEW WO PELVIS RIGHT-      INDICATION: Postop hip arthroplasty; S72.001A-Fracture of unspecified  part of neck of right femur, initial encounter for closed fracture;  S72.141A-Displaced intertrochanteric fracture of right femur, initial  encounter for closed fracture.      COMPARISON: None.     FINDINGS: Single images of the right hip demonstrates a sliding nail  prosthesis stabilizing an intertrochanteric fracture of the right femur.            Impression:       Expected postoperative findings.     D:  01/30/2018  E:  01/30/2018     This report was finalized on 1/30/2018 1:58 PM by Dr. Abilio Dexter MD.       FL C Arm During Surgery [[id number]] Collected:  01/30/18 0906     Updated:  01/30/18 1401    Narrative:       EXAMINATION: FLUOROSCOPY  C-ARM DURING SURGERY-01/29/2018:     INDICATION: RIGHT TROCHANTERIC NAIL; S72.001A-Fracture of unspecified  part of neck of right femur, initial encounter for closed fracture;  S72.141A-Displaced intertrochanteric fracture of right femur, initial  encounter for closed fracture.         TECHNIQUE: 52 seconds of fluoroscopic time was used to assist in this  procedure.     COMPARISON: NONE.     FINDINGS:  Nine intraoperative images were obtained during placement of  a sliding nail prosthesis.       Impression:       Fluoroscopy and intraoperative imaging was utilized to  assist in this procedure.     D:  01/30/2018  E:  01/30/2018            This report was finalized on 1/30/2018 1:58 PM by Dr. Abilio Dexter MD.                Order Current Status    Urine Culture - Urine, Urine, Clean Catch Preliminary result        Discharge Details      Martin Moctezuma WOLFGANG   Home Medication Instructions GIL:[id number]    Printed on:02/03/18 0831   Medication Information                      aspirin 81 MG EC tablet  Take 81 mg by mouth Daily.             bisoprolol (ZEBeta) 5 MG tablet  Take 5 mg by mouth Daily.             buPROPion SR (WELLBUTRIN SR) 150 MG 12 hr tablet  Take 150 mg by mouth 2 (two) times a day.             cholecalciferol (VITAMIN D3) 1000 UNITS tablet  Take 1,000 Units by mouth Daily.             docusate sodium (COLACE) 100 MG capsule  Take 1 capsule by mouth 2 (Two) Times a Day As Needed for constipation.             dorzolamide (TRUSOPT) 2 % ophthalmic solution  1 drop 3 (Three) Times a Day.             enoxaparin (LOVENOX) 40 MG/0.4ML solution syringe  Inject 0.4 mL under the skin Daily. X 6 weeks             fludrocortisone 0.1 MG tablet  Take 0.1 mg by mouth Daily.             fluticasone-salmeterol (ADVAIR) 250-50 MCG/DOSE DISKUS  Inhale 2 (Two) Times a Day.             furosemide (LASIX) 20 MG tablet  Take 1 tablet by mouth Daily.             HYDROcodone-acetaminophen (NORCO) 5-325 MG per tablet  Take 1 tablet  by mouth Every 4 (Four) Hours As Needed for Severe Pain (7-10).             insulin detemir (LEVEMIR) 100 UNIT/ML injection  Inject 4 Units under the skin Every Night.             insulin detemir (LEVEMIR) 100 UNIT/ML injection  Inject 10 Units under the skin in the AM.             insulin lispro (humaLOG) 100 UNIT/ML injection  Inject 5 Units under the skin 3 (Three) Times a Day With Meals.             insulin lispro (humaLOG) 100 UNIT/ML injection  Inject 0-9 Units under the skin 4 (Four) Times a Day With Meals & at Bedtime.             latanoprost (XALATAN) 0.005 % ophthalmic solution  1 drop Every Night.             lisinopril (PRINIVIL,ZESTRIL) 10 MG tablet  Take 10 mg by mouth daily.             Loratadine 10 MG capsule  Take  by mouth.             nicotine (NICODERM CQ) 21 MG/24HR patch  Place 1 patch on the skin Daily.             nitroglycerin (NITROSTAT) 0.4 MG SL tablet  Place 0.4 mg under the tongue Every 5 (Five) Minutes As Needed for Chest Pain. Take no more than 3 doses in 15 minutes.             pantoprazole (PROTONIX) 40 MG EC tablet  Take 40 mg by mouth Daily.             Pediatric Multivitamins-Iron (FLINTSTONES PLUS IRON) chewable tablet  Chew 1 tablet Daily.             pentoxifylline (TRENtal) 400 MG CR tablet  Take 400 mg by mouth 3 (Three) Times a Day With Meals.             polyethylene glycol (MIRALAX) packet  Take 17 g by mouth Daily.             rosuvastatin (CRESTOR) 20 MG tablet  Take 40 mg by mouth Daily.             sertraline (ZOLOFT) 100 MG tablet  Take 100 mg by mouth daily.             tamsulosin (FLOMAX) 0.4 MG capsule 24 hr capsule  Take 1 capsule by mouth Daily.             vitamin B-12 (VITAMIN B-12) 1000 MCG tablet  Take 1 tablet by mouth Daily.                   Discharge Disposition:  [facility]    Discharge Diet: cardiac, diabetic      Discharge Activity: weight bearing as tolerated, no hip precautions necessary      Future Appointments  Date Time Provider Department  [place]   2/22/2018 11:20 AM Titus Woody MD [facility] None       Additional Instructions for the Follow-ups that You Need to Schedule     Discharge Follow-up with Specialty: Dr. Díaz in 3 weeks    As directed    Specialty:  Dr. Díaz in 3 weeks                     Time Spent on Discharge:  45 minutes    DARNELL Crum  02/03/18  8:31 AM

## 2018-02-04 ENCOUNTER — HOSPITAL ENCOUNTER (EMERGENCY)
Facility: HOSPITAL | Age: 73
Discharge: HOME | End: 2018-02-05
Attending: EMERGENCY MEDICINE | Admitting: EMERGENCY MEDICINE

## 2018-02-04 ENCOUNTER — APPOINTMENT (OUTPATIENT)
Dept: GENERAL RADIOLOGY | Facility: HOSPITAL | Age: 73
End: 2018-02-04

## 2018-02-04 DIAGNOSIS — Z98.890 HISTORY OF HIP SURGERY: Primary | ICD-10-CM

## 2018-02-04 DIAGNOSIS — I10 ESSENTIAL HYPERTENSION: ICD-10-CM

## 2018-02-04 DIAGNOSIS — E11.9 TYPE 2 DIABETES MELLITUS WITHOUT COMPLICATION, WITH LONG-TERM CURRENT USE OF INSULIN (HCC): ICD-10-CM

## 2018-02-04 DIAGNOSIS — S89.91XA INJURY OF RIGHT KNEE, INITIAL ENCOUNTER: ICD-10-CM

## 2018-02-04 DIAGNOSIS — Z79.4 TYPE 2 DIABETES MELLITUS WITHOUT COMPLICATION, WITH LONG-TERM CURRENT USE OF INSULIN (HCC): ICD-10-CM

## 2018-02-04 LAB — BACTERIA SPEC AEROBE CULT: NORMAL

## 2018-02-04 PROCEDURE — 99284 EMERGENCY DEPT VISIT MOD MDM: CPT

## 2018-02-04 PROCEDURE — 73560 X-RAY EXAM OF KNEE 1 OR 2: CPT

## 2018-02-04 RX ORDER — ATORVASTATIN CALCIUM 80 MG/1
80 TABLET, FILM COATED ORAL DAILY
COMMUNITY
End: 2018-05-18

## 2018-02-04 RX ORDER — METAXALONE 800 MG/1
400 TABLET ORAL 3 TIMES DAILY PRN
COMMUNITY
End: 2018-04-17

## 2018-02-04 RX ORDER — POTASSIUM CHLORIDE 750 MG/1
10 TABLET, FILM COATED, EXTENDED RELEASE ORAL
COMMUNITY
End: 2021-01-15

## 2018-02-05 VITALS
TEMPERATURE: 97.4 F | HEIGHT: 68 IN | SYSTOLIC BLOOD PRESSURE: 128 MMHG | BODY MASS INDEX: 21.67 KG/M2 | OXYGEN SATURATION: 97 % | DIASTOLIC BLOOD PRESSURE: 72 MMHG | HEART RATE: 70 BPM | RESPIRATION RATE: 16 BRPM | WEIGHT: 143 LBS

## 2018-02-05 NOTE — DISCHARGE INSTRUCTIONS
Patient reports twisting type injury to knee while being transferred from the wheelchair into his bed while at rehabilitation at [facility].  Plain films of the right knee show no acute bony abnormality.  If pain persists, patient may need MRI to rule out any type of ligamentous injury.  Ice as needed for swelling.  Continue with Norco 5 mg by mouth every 4-6 hours as needed for moderate pain.  Patient is to return if any worsening symptoms.  Exam is stable at this time.

## 2018-02-05 NOTE — ED PROVIDER NOTES
Subjective   HPI Comments: Mr. Martin Moctezuma is a 72 y.o. Male who presents to the ED with c/o right knee pain. He reports that he had a right hip trochanteric nailing with intra-medullary hip screw 6 days ago and he has been at [facility] since then. However, while he was getting into bed this evening, he was sitting on the edge and a nurse was helping him move his right leg onto the bed when she accidentally dropped it and he felt a pop below his right knee. He currently complains of right leg pain from around his mid-thigh to his ankle. He was given Norco and Robaxin at 1950 with moderate relief. No other acute complaints at this time.    Patient is a 72 y.o. male presenting with lower extremity pain.   History provided by:  Patient  Lower Extremity Issue   Location:  Knee  Time since incident:  3 hours  Knee location:  R knee  Pain details:     Severity:  Mild    Onset quality:  Sudden    Duration:  3 hours    Timing:  Constant  Chronicity:  New  Foreign body present:  No foreign bodies  Relieved by: Norco and Robaxin.      Review of Systems   Musculoskeletal: Positive for arthralgias (Right knee pain).   All other systems reviewed and are negative.      Past Medical History:   Diagnosis Date   • Coronary artery disease    • Diabetes mellitus    • Diabetic retinopathy    • Hypertension    • Injury of back    • Legally blind    • Myocardial infarction    • Stroke        No Known Allergies    Past Surgical History:   Procedure Laterality Date   • CARDIAC SURGERY      cardiac stents   • CARDIAC SURGERY      CABG   • CATARACT EXTRACTION     • CORONARY ANGIOPLASTY WITH STENT PLACEMENT     • EYE SURGERY     • HIP TROCANTERIC NAILING WITH INTRAMEDULLARY HIP SCREW Right 1/29/2018    Procedure: HIP TROCANTERIC NAILING WITH INTRAMEDULLARY HIP SCREW;  Surgeon: Titus Woody MD;  Location: [facility] OR;  Service:    • TOE AMPUTATION         History reviewed. No pertinent family history.    Social History     Social  History   • Marital status:      Spouse name: N/A   • Number of children: N/A   • Years of education: N/A     Social History Main Topics   • Smoking status: Current Every Day Smoker     Packs/day: 1.00   • Smokeless tobacco: None   • Alcohol use No   • Drug use: No   • Sexual activity: Not Asked     Other Topics Concern   • None     Social History Narrative         Objective   Physical Exam   Constitutional: He is oriented to person, place, and time. He appears well-developed and well-nourished. No distress.   Patient appears a little drowsy 2/2 recent oral pain medication but he is able to answer questions appropriately.    HENT:   Head: Normocephalic and atraumatic.   Nose: Nose normal.   Eyes: Conjunctivae and EOM are normal. Pupils are equal, round, and reactive to light. No scleral icterus.   Neck: Normal range of motion. Neck supple.   Cardiovascular: Normal rate, regular rhythm, normal heart sounds and intact distal pulses.    No murmur heard.  Patient has strong bilateral DP and PT pulses.   Pulmonary/Chest: Effort normal and breath sounds normal. No respiratory distress.   Abdominal: Soft. Bowel sounds are normal. There is no tenderness.   Musculoskeletal: Normal range of motion.   Patient has mild soft tissue swelling to his right knee as well as TTP to the medial aspect of the right knee. He also has pain with flexion of his right knee. He also has trace pedal edema to his bilateral lower extremities. No laxity with right knee exam.  Pt is s/p right great toe amputation.   Neurological: He is alert and oriented to person, place, and time. He has normal strength. No cranial nerve deficit or sensory deficit.   Overall functional decline.   Skin: Skin is warm and dry. He is not diaphoretic.   Patient's surgical incision looks good to right hip. Patient has good capillary refill. No erythema or warmth to RLE.   Psychiatric: He has a normal mood and affect. His behavior is normal.   Nursing note and  vitals reviewed.      Procedures         ED Course  ED Course   Comment By Time   Plain films of the right knee show no acute bony abnormality.  There is mild soft tissue edema, but no evidence of laxity or effusion on exam.  Patient is stable to return to [facility].  If pain persists, patient may need MRI of the right knee to rule out any type of ligamentous injury.  He needs to continue with his Norco as directed for pain.  Vital signs are stable and he is ready for discharge. Danita Daniels PA-C 02/04 233     No results found for this or any previous visit (from the past 24 hour(s)).  Note: In addition to lab results from this visit, the labs listed above may include labs taken at another facility or during a different encounter within the last 24 hours. Please correlate lab times with ED admission and discharge times for further clarification of the services performed during this visit.    XR Knee 1 or 2 View Right   Final Result     Diffuse soft tissue edema. Otherwise, no acute findings identified.      THIS DOCUMENT HAS BEEN ELECTRONICALLY SIGNED BY SIMON HARMAN MD        Vitals:    02/04/18 2200 02/04/18 2230 02/04/18 2259 02/04/18 2300   BP: 133/57 133/62  131/55   Pulse:       Resp:       Temp:       TempSrc:       SpO2: 100% 99% 99%    Weight:       Height:         Medications - No data to display  ECG/EMG Results (last 24 hours)     ** No results found for the last 24 hours. **                          MDM    Final diagnoses:   History of hip surgery   Injury of right knee, initial encounter   Essential hypertension   Type 2 diabetes mellitus without complication, with long-term current use of insulin       Documentation assistance provided by iris Erwin.  Information recorded by the scribe was done at my direction and has been verified and validated by me.     Mile Erwin  02/04/18 2223       Mile Erwin  02/04/18 2320       Danita Daniels PA-C  02/05/18 0002

## 2018-02-06 ENCOUNTER — TELEPHONE (OUTPATIENT)
Dept: ORTHOPEDIC SURGERY | Facility: CLINIC | Age: 73
End: 2018-02-06

## 2018-02-06 DIAGNOSIS — S72.141D CLOSED DISPLACED INTERTROCHANTERIC FRACTURE OF RIGHT FEMUR WITH ROUTINE HEALING, SUBSEQUENT ENCOUNTER: Primary | ICD-10-CM

## 2018-02-06 RX ORDER — HYDROCODONE BITARTRATE AND ACETAMINOPHEN 5; 325 MG/1; MG/1
1 TABLET ORAL EVERY 6 HOURS PRN
Qty: 50 TABLET | Refills: 0 | Status: SHIPPED | OUTPATIENT
Start: 2018-02-06 | End: 2018-05-18

## 2018-02-06 NOTE — TELEPHONE ENCOUNTER
Remind me to print off tomorrow.  I tried tonight but couldn't get it to print on Rx paper.    PARAS

## 2018-02-06 NOTE — TELEPHONE ENCOUNTER
THIS PT HAD SURGERY WITH DR CARPENTER ON 1/29. HE SAID HE'S NO LONGER AT [facility] DUE TO HIS INSURANCE. HE'S ASKING FOR A REFILL ON HIS PAIN MEDICATION BECAUSE HE'S OUT OF HIS INITIAL PRESCRIPTION.

## 2018-02-13 ENCOUNTER — TELEPHONE (OUTPATIENT)
Dept: ORTHOPEDIC SURGERY | Facility: CLINIC | Age: 73
End: 2018-02-13

## 2018-02-13 NOTE — TELEPHONE ENCOUNTER
Ok.  I have reviewed his ER note.  They were worried about his right knee.  I would be worried about his hip making sure that there is no fracture below the nail.  He needs an x-ray of his entire femur.  If he still having knee pain as a result, an MRI of his knee can be ordered.  An MRI of his hip would be of little use.  Please call and let him know about this plan.    Thanks.    PARAS

## 2018-02-13 NOTE — TELEPHONE ENCOUNTER
Explain to him that an MRI would not be helpful because the metal in his hip would obscure a lot of the images

## 2018-02-13 NOTE — TELEPHONE ENCOUNTER
He wants to know if his knee x-rays looked ok as well.  He is also having spasms in this leg.  Would a muscle relaxer help with this?  Megan

## 2018-02-13 NOTE — TELEPHONE ENCOUNTER
THIS IS A JRT PT THAT HAD SURGERY WHILE IN THE HOSPITAL. HE HAS NOT BEEN SEEN IN THE OFFICE FOR A POST OP VISIT YET. PT STATED HIS LEG WAS DROPPED WHILE AT [facility]. THIS IS THE SAME SIDE THAT HIS HIP SURGERY WAS ON. HE WENT TO THE ER AFTER THIS HAPPENED AND HAD A XRAY, BUT THE PT FEELS THAT HE NEEDS A MRI BECAUSE HE'S STILL HAVING PAIN. PLEASE ADVISE.

## 2018-02-15 ENCOUNTER — TELEPHONE (OUTPATIENT)
Dept: ORTHOPEDIC SURGERY | Facility: CLINIC | Age: 73
End: 2018-02-15

## 2018-02-15 NOTE — TELEPHONE ENCOUNTER
Dr. Woody,    I spoke with him earlier and he said he didn't want an MRI but now he has decided he thinks it might be a good idea to get one of the knee since it is bothering him more. Can you please put an order in for this?  Megan

## 2018-02-15 NOTE — TELEPHONE ENCOUNTER
The knee bothers him a lot more than hip. He does not want an MRI at this point.  His PT came to see him yesterday and told him it was fairly normal to have the swelling.  He has not been elevating properly so I explained how to do so and he is going to try this to help with the swelling.  He will call us if things get worse.  Megan

## 2018-02-16 NOTE — TELEPHONE ENCOUNTER
He is coming in on Monday to see Ana for an xray and discuss ordering an MRI on the right knee.  Megan

## 2018-02-16 NOTE — TELEPHONE ENCOUNTER
Ok so do you want to see him for the xray here or just order one and him go get it somewhere else?  Megan

## 2018-02-19 ENCOUNTER — OFFICE VISIT (OUTPATIENT)
Dept: ORTHOPEDIC SURGERY | Facility: CLINIC | Age: 73
End: 2018-02-19

## 2018-02-19 VITALS
WEIGHT: 143.08 LBS | HEIGHT: 68 IN | SYSTOLIC BLOOD PRESSURE: 136 MMHG | BODY MASS INDEX: 21.68 KG/M2 | DIASTOLIC BLOOD PRESSURE: 58 MMHG | HEART RATE: 71 BPM

## 2018-02-19 DIAGNOSIS — Z98.890 POST-OPERATIVE STATE: ICD-10-CM

## 2018-02-19 DIAGNOSIS — S86.812A PATELLAR TENDON RUPTURE, LEFT, INITIAL ENCOUNTER: Primary | ICD-10-CM

## 2018-02-19 PROCEDURE — 99024 POSTOP FOLLOW-UP VISIT: CPT | Performed by: PHYSICIAN ASSISTANT

## 2018-02-19 PROCEDURE — 99213 OFFICE O/P EST LOW 20 MIN: CPT | Performed by: PHYSICIAN ASSISTANT

## 2018-02-19 RX ORDER — HYDROCODONE BITARTRATE AND ACETAMINOPHEN 5; 325 MG/1; MG/1
1 TABLET ORAL EVERY 6 HOURS PRN
Qty: 30 TABLET | Refills: 0 | Status: SHIPPED | OUTPATIENT
Start: 2018-02-19 | End: 2018-03-12 | Stop reason: SDUPTHER

## 2018-02-19 NOTE — PROGRESS NOTES
"    [facility] Note    Subjective     Pain of the Right Hip (/HIP TROCANTERIC NAILING WITH INTRAMEDULLARY HIP SCREW - Right 1/29/18) and Pain of the Right Femur      HPI    Martin Moctezuma is a 72 y.o. male. Patient presents today with complaint of right knee pain that started after his leg was \"dropped\" and he felt a \"pop\" in the knee (2/4/2018).  He was seen in the ED and knee  imaging was reported as negative.  He was initially having the pain from mid-thigh into his ankle but now the pain is just below his knee.  No other issues or complaints.  He denies numbness or tingling into the extremity.  He also denies any fever, chills or night sweats.  Patient is currently taking Norco one every 6 hours.  He was given a prescription of 50 (2/6/18) and now only has 9 left. He is requesting a refill.      Past Medical History:   Diagnosis Date   • Coronary artery disease    • Diabetes mellitus    • Diabetic retinopathy    • Hypertension    • Injury of back    • Legally blind    • Myocardial infarction    • Stroke       Past Surgical History:   Procedure Laterality Date   • CARDIAC SURGERY      cardiac stents   • CARDIAC SURGERY      CABG   • CATARACT EXTRACTION     • CORONARY ANGIOPLASTY WITH STENT PLACEMENT     • EYE SURGERY     • HIP SURGERY Right     HIP TROCANTERIC NAILING WITH INTRAMEDULLARY HIP SCREW    • HIP TROCANTERIC NAILING WITH INTRAMEDULLARY HIP SCREW Right 1/29/2018    Procedure: HIP TROCANTERIC NAILING WITH INTRAMEDULLARY HIP SCREW;  Surgeon: Titus Woody MD;  Location: [facility];  Service:    • TOE AMPUTATION        Family History   Problem Relation Age of Onset   • Hypertension Mother    • Stroke Father      Social History     Social History   • Marital status:      Spouse name: N/A   • Number of children: N/A   • Years of education: N/A     Occupational History   • Not on file.     Social History Main Topics   • Smoking status: Current Every Day Smoker     Packs/day: " 1.00     Start date: 1967   • Smokeless tobacco: Never Used   • Alcohol use No   • Drug use: No   • Sexual activity: Defer     Other Topics Concern   • Not on file     Social History Narrative      Current Outpatient Prescriptions on File Prior to Visit   Medication Sig Dispense Refill   • aspirin 81 MG EC tablet Take 81 mg by mouth Daily.     • atorvastatin (LIPITOR) 80 MG tablet Take 80 mg by mouth Daily.     • bisoprolol (ZEBeta) 5 MG tablet Take 5 mg by mouth Daily.     • buPROPion SR (WELLBUTRIN SR) 150 MG 12 hr tablet Take 150 mg by mouth 2 (two) times a day.     • cholecalciferol (VITAMIN D3) 1000 UNITS tablet Take 1,000 Units by mouth Daily.     • docusate sodium (COLACE) 100 MG capsule Take 1 capsule by mouth 2 (Two) Times a Day As Needed for constipation. 60 capsule 0   • dorzolamide (TRUSOPT) 2 % ophthalmic solution 1 drop 3 (Three) Times a Day.     • enoxaparin (LOVENOX) 40 MG/0.4ML solution syringe Inject 0.4 mL under the skin Daily. X 6 weeks 11.2 mL    • fludrocortisone 0.1 MG tablet Take 0.1 mg by mouth Daily.     • fluticasone-salmeterol (ADVAIR) 250-50 MCG/DOSE DISKUS Inhale 2 (Two) Times a Day.     • furosemide (LASIX) 20 MG tablet Take 1 tablet by mouth Daily.     • HYDROcodone-acetaminophen (NORCO) 5-325 MG per tablet Take 1 tablet by mouth Every 6 (Six) Hours As Needed for Severe Pain . 50 tablet 0   • insulin detemir (LEVEMIR) 100 UNIT/ML injection Inject 10 Units under the skin Daily.  12   • insulin lispro (humaLOG) 100 UNIT/ML injection Inject 0-9 Units under the skin 4 (Four) Times a Day With Meals & at Bedtime.  12   • latanoprost (XALATAN) 0.005 % ophthalmic solution 1 drop Every Night.     • lisinopril (PRINIVIL,ZESTRIL) 10 MG tablet Take 10 mg by mouth daily.     • Loratadine 10 MG capsule Take  by mouth.     • metaxalone (SKELAXIN) 800 MG tablet Take 400 mg by mouth 3 (Three) Times a Day As Needed for Muscle Spasms.     • Multiple Vitamins-Minerals (MULTIVITAMIN ADULT PO) Take  by  "mouth.     • nicotine (NICODERM CQ) 21 MG/24HR patch Place 1 patch on the skin Daily.     • nitroglycerin (NITROSTAT) 0.4 MG SL tablet Place 0.4 mg under the tongue Every 5 (Five) Minutes As Needed for Chest Pain. Take no more than 3 doses in 15 minutes.     • pantoprazole (PROTONIX) 40 MG EC tablet Take 40 mg by mouth Daily.     • Pediatric Multivitamins-Iron (FLINTSTONES PLUS IRON) chewable tablet Chew 1 tablet Daily.     • pentoxifylline (TRENtal) 400 MG CR tablet Take 400 mg by mouth 3 (Three) Times a Day With Meals.     • polyethylene glycol (MIRALAX) packet Take 17 g by mouth Daily.     • potassium chloride (K-DUR) 10 MEQ CR tablet Take 10 mEq by mouth.     • rosuvastatin (CRESTOR) 20 MG tablet Take 40 mg by mouth Daily.     • sertraline (ZOLOFT) 100 MG tablet Take 100 mg by mouth daily.     • tamsulosin (FLOMAX) 0.4 MG capsule 24 hr capsule Take 1 capsule by mouth Daily. 30 capsule    • vitamin B-12 (VITAMIN B-12) 1000 MCG tablet Take 1 tablet by mouth Daily.     • [DISCONTINUED] insulin detemir (LEVEMIR) 100 UNIT/ML injection Inject 4 Units under the skin Every Night.  12   • [DISCONTINUED] insulin lispro (humaLOG) 100 UNIT/ML injection Inject 5 Units under the skin 3 (Three) Times a Day With Meals.  12     No current facility-administered medications on file prior to visit.       No Known Allergies     The following portions of the patient's history were reviewed and updated as appropriate: allergies, current medications, past family history, past medical history, past social history, past surgical history and problem list.    Review of Systems   Constitutional: Positive for appetite change.   Musculoskeletal: Positive for arthralgias.        Objective      Physical Exam  /58  Pulse 71  Ht 172.7 cm (67.99\")  Wt 64.9 kg (143 lb 1.3 oz)  BMI 21.76 kg/m2    Body mass index is 21.76 kg/(m^2).    General  Mental Status - alert  General Appearance - cooperative, well groomed, not in acute " distress  Orientation - Oriented X3  Build & Nutrition - well developed and well nourished  Posture - normal posture  Gait - normal gait     Integumentary  Global Assessment  Examination of related systems reveals - no lymphadenopathy  General Characteristics  Overall examination of the patient's skin reveals - no rashes, no evidence of scars, no suspicious lesions and no bruises.  Color - normal coloration of skin.    Ortho Exam  Right lower extremity  Skin: Staples are still in place from his previous hip surgery.  With regard to these incision sites there is no erythema, warmth or drainage.  No evidence of infection.  Staples will be removed today.  With regards to his knee skin is intact without redness, warmth or swelling.  He does have a visible defect along the patellar tendon when compared to the contralateral side.  Tenderness: Minimal tenderness over the incision sites at the hip.  No palpable mid thigh pain.  Positive over patellar tendon.  No medial or joint line tenderness.  Testing: Patient is able to perform knee extension but with increased pain.  Neurovascular: Grossly intact L2-S1    Imaging/Studies  Right femur films ordered today.  Images were reviewed with Dr. Herrera.  Hardware is in place without any evidence of loosening or complication.  Questionable cortical irregularity posterior cortex just distal to the tip of the intramedullary nail, will continue to watch.  See chart for official results.    Assessment:  1. Patellar tendon rupture, left, initial encounter    2. Post-operative state        Plan:  1. Discussion was had with the patient regarding exam, assessment and treatment options.  2. Based on his partial patellar tendon tear he was placed in a T ROM knee brace locked in extension that he will wear full-time, until his next follow-up appointment.  3. Hip staples were removed today and replaced with Steri-Strips.  4. Norco was refilled today by Dr Herrera after discussion with   Bong and after completion of C[facility] query and thorough review of the [facility] report, the patient was prescribed a limited course of narcotic pain medication.  The risks, benefits, and alternatives of controlled substance usage were discussed with the patient prior to providing the prescription.  5. Patient follow-up in 4 weeks for repeat evaluation.  6. Questions and concerns were answered.      Medical Decision Making  Management Options : prescription/IM medicine  Data/Risk: radiology tests    Ana Sampson PA-C  02/19/18  1:04 PM

## 2018-02-20 ENCOUNTER — TELEPHONE (OUTPATIENT)
Dept: ORTHOPEDIC SURGERY | Facility: CLINIC | Age: 73
End: 2018-02-20

## 2018-02-20 NOTE — TELEPHONE ENCOUNTER
TONIO -CARETENDERS OT-WOULD LIKE CLARIFICATION ON THE INSTRUCTIONS FOR PT BRACE. INFORMED HER WHAT IT SAID IN THE OFFICE VISIT. TONIO STATES THAT PT THINKS THE BRACE CAN BE REMOVED FOR CHANGING CLOTHES. PLEASE CALL TONIO BACK WITH CLARIFICATIONS. TONIO IS AT PT HOUSE NOW. THANKS.

## 2018-02-22 NOTE — TELEPHONE ENCOUNTER
I spoke with Kaci today when she called back while she is at the patients [place].  She states that the patient has the brace over his blue jeans with the 3rd distal buckle undone.    I explained to her to loosen the back of the buckle on that strap slightly and to put on some thin pants to wear under the brace ( since the patient is diabetic and we don't want it to cause any irritation)  while keeping the leg in full extension. I spoke with the patient and explained that he needs to wear the brace to help him heal.  He has a ulcered area around his ankle and the brace migrates down on his leg.  I told him if he can keep the top two straps snug this should help with the migrating.  I told the patient and Kaci to call back if they had any further questions or problems.  Brandie

## 2018-03-07 ENCOUNTER — HOSPITAL ENCOUNTER (EMERGENCY)
Facility: HOSPITAL | Age: 73
Discharge: HOME | End: 2018-03-07
Attending: EMERGENCY MEDICINE | Admitting: EMERGENCY MEDICINE

## 2018-03-07 VITALS
HEIGHT: 69 IN | OXYGEN SATURATION: 97 % | HEART RATE: 74 BPM | TEMPERATURE: 98 F | SYSTOLIC BLOOD PRESSURE: 130 MMHG | BODY MASS INDEX: 20.88 KG/M2 | WEIGHT: 141 LBS | DIASTOLIC BLOOD PRESSURE: 67 MMHG | RESPIRATION RATE: 18 BRPM

## 2018-03-07 DIAGNOSIS — F41.9 ANXIETY: Primary | ICD-10-CM

## 2018-03-07 DIAGNOSIS — E86.9 VOLUME DEPLETION: ICD-10-CM

## 2018-03-07 LAB
ALBUMIN SERPL-MCNC: 3.7 G/DL (ref 3.2–4.8)
ALBUMIN/GLOB SERPL: 1.3 G/DL (ref 1.5–2.5)
ALP SERPL-CCNC: 116 U/L (ref 25–100)
ALT SERPL W P-5'-P-CCNC: 10 U/L (ref 7–40)
ANION GAP SERPL CALCULATED.3IONS-SCNC: 2 MMOL/L (ref 3–11)
AST SERPL-CCNC: 17 U/L (ref 0–33)
BASOPHILS # BLD AUTO: 0.05 10*3/MM3 (ref 0–0.2)
BASOPHILS NFR BLD AUTO: 0.7 % (ref 0–1)
BILIRUB SERPL-MCNC: 0.3 MG/DL (ref 0.3–1.2)
BUN BLD-MCNC: 15 MG/DL (ref 9–23)
BUN/CREAT SERPL: 13.6 (ref 7–25)
CALCIUM SPEC-SCNC: 8.5 MG/DL (ref 8.7–10.4)
CHLORIDE SERPL-SCNC: 108 MMOL/L (ref 99–109)
CO2 SERPL-SCNC: 25 MMOL/L (ref 20–31)
CREAT BLD-MCNC: 1.1 MG/DL (ref 0.6–1.3)
DEPRECATED RDW RBC AUTO: 46.4 FL (ref 37–54)
EOSINOPHIL # BLD AUTO: 0.25 10*3/MM3 (ref 0–0.3)
EOSINOPHIL NFR BLD AUTO: 3.3 % (ref 0–3)
ERYTHROCYTE [DISTWIDTH] IN BLOOD BY AUTOMATED COUNT: 14.7 % (ref 11.3–14.5)
GFR SERPL CREATININE-BSD FRML MDRD: 66 ML/MIN/1.73
GLOBULIN UR ELPH-MCNC: 2.9 GM/DL
GLUCOSE BLD-MCNC: 166 MG/DL (ref 70–100)
HCT VFR BLD AUTO: 33.6 % (ref 38.9–50.9)
HGB BLD-MCNC: 10.6 G/DL (ref 13.1–17.5)
IMM GRANULOCYTES # BLD: 0.01 10*3/MM3 (ref 0–0.03)
IMM GRANULOCYTES NFR BLD: 0.1 % (ref 0–0.6)
LYMPHOCYTES # BLD AUTO: 1.15 10*3/MM3 (ref 0.6–4.8)
LYMPHOCYTES NFR BLD AUTO: 15 % (ref 24–44)
MCH RBC QN AUTO: 27.5 PG (ref 27–31)
MCHC RBC AUTO-ENTMCNC: 31.5 G/DL (ref 32–36)
MCV RBC AUTO: 87 FL (ref 80–99)
MONOCYTES # BLD AUTO: 0.58 10*3/MM3 (ref 0–1)
MONOCYTES NFR BLD AUTO: 7.6 % (ref 0–12)
NEUTROPHILS # BLD AUTO: 5.64 10*3/MM3 (ref 1.5–8.3)
NEUTROPHILS NFR BLD AUTO: 73.3 % (ref 41–71)
PLATELET # BLD AUTO: 296 10*3/MM3 (ref 150–450)
PMV BLD AUTO: 9.9 FL (ref 6–12)
POTASSIUM BLD-SCNC: 4.6 MMOL/L (ref 3.5–5.5)
PROT SERPL-MCNC: 6.6 G/DL (ref 5.7–8.2)
RBC # BLD AUTO: 3.86 10*6/MM3 (ref 4.2–5.76)
SODIUM BLD-SCNC: 135 MMOL/L (ref 132–146)
WBC NRBC COR # BLD: 7.68 10*3/MM3 (ref 3.5–10.8)

## 2018-03-07 PROCEDURE — 99284 EMERGENCY DEPT VISIT MOD MDM: CPT

## 2018-03-07 PROCEDURE — 80053 COMPREHEN METABOLIC PANEL: CPT | Performed by: PHYSICIAN ASSISTANT

## 2018-03-07 PROCEDURE — 85025 COMPLETE CBC W/AUTO DIFF WBC: CPT | Performed by: PHYSICIAN ASSISTANT

## 2018-03-07 RX ADMIN — SODIUM CHLORIDE 500 ML: 9 INJECTION, SOLUTION INTRAVENOUS at 17:41

## 2018-03-08 NOTE — DISCHARGE INSTRUCTIONS
Dehydration  Dehydration is a condition in which there is not enough fluid or water in the body. This happens when you lose more fluids than you take in. Important organs, such as the kidneys, brain, and heart, cannot function without a proper amount of fluids. Any loss of fluids from the body can lead to dehydration.  People age 65 or older have a higher risk of dehydration than younger adults because in older age, the body:  · Is less able to conserve water.  · Does not respond to temperature changes as well.  · Does not get thirsty as easily or quickly.  Dehydration can range from mild to severe. This condition should be treated right away to prevent it from becoming severe.  What are the causes?  Dehydration may be caused by:  · Vomiting.  · Diarrhea.  · Excessive sweating, such as from heat exposure or exercise.  · Not drinking enough fluid, especially:  ¨ When ill.  ¨ While doing activity that requires a lot of energy.  · Excessive urination.  · Fever.  · Infection.  · Certain medicines, such as medicines that cause the body to lose excess fluid (diuretics).  · Inability to access safe drinking water.  · Poorly controlled blood sugars.  · Reduced physical ability to get adequate water and food.  What increases the risk?  This condition is more likely to develop in people who:  · Have a long-term (chronic) illness, such as:  ¨ An illness that may increase urination, such as diabetes.  ¨ Kidney, heart, or lung disease.  ¨ Neurological or psychological disorders, such as dementia.  · Are age 65 or older.  · Are disabled.  · Live in a place with high altitude.  What are the signs or symptoms?  Symptoms of mild dehydration may include:   · Thirst.  · Dry lips.  · Slightly dry mouth.  · Dry, warm skin.  · Dizziness.  Symptoms of moderate dehydration may include:   · Very dry mouth.  · Muscle cramps.  · Dark urine. Urine may be the color of tea.  · Decreased urine production.  · Decreased tear  production.  · Heartbeat that is irregular or faster than normal (palpitations).  · Headache.  · Light-headedness, especially when you stand up from a sitting position.  · Fainting (syncope).  Symptoms of severe dehydration may include:   · Changes in skin, such as:  ¨ Cold and clammy skin.  ¨ Blotchy (mottled) or pale skin.  ¨ Skin that does not quickly return to normal after being lightly pinched and released (poor skin turgor).  · Changes in body fluids, such as:  ¨ Extreme thirst.  ¨ No tear production.  ¨ Inability to sweat when body temperature is high, such as in hot weather.  ¨ Very little urine production.  · Changes in vital signs, such as:  ¨ Weak pulse.  ¨ Pulse that is more than 100 beats a minute when sitting still.  ¨ Rapid breathing.  ¨ Low blood pressure.  · Other changes, such as:  ¨ Sunken eyes.  ¨ Cold hands and feet.  ¨ Confusion.  ¨ Lack of energy (lethargy).  ¨ Difficulty waking up from sleep.  ¨ Short-term weight loss.  ¨ Unconsciousness.  How is this diagnosed?  This condition is diagnosed based on your symptoms and a physical exam. Blood and urine tests may be done to help confirm the diagnosis.  How is this treated?  Treatment for this condition depends on the severity. Mild or moderate dehydration can often be treated at home. Treatment should be started right away. Do not wait until dehydration becomes severe. Severe dehydration is an emergency and it needs to be treated in a hospital.  Treatment for mild dehydration may include:   · Drinking more fluids.  · Replacing salts and minerals in your blood (electrolytes).  Treatment for moderate dehydration may include:   · Drinking an oral rehydration solution (ORS). This is a drink that helps you replace fluids and electrolytes (rehydrate). It is found at pharmacies and retail stores.  Treatment for severe dehydration may include:   · Receiving fluids through an IV tube.  · Receiving an electrolyte solution through a tube passed through your  nose and into your stomach (nasogastric tube or NG tube).  · Correcting abnormalities in electrolytes.  · Treating the underlying cause of dehydration.  Follow these instructions at home:  · If directed by your health care provider, drink an ORS:  ¨ Make an ORS by following instructions on the package.  ¨ Start by drinking small amounts, about ½ cup (120 mL) every 5-10 minutes.  ¨ Slowly increase how much you drink until you have taken the amount recommended by your health care provider.  · Drink enough clear fluid to keep your urine clear or pale yellow. If you were told to drink an ORS, finish the ORS first, then start slowly drinking other clear fluids. Drink fluids such as:  ¨ Water. Do not drink only water. Doing that can lead to having too little salt (sodium) in the body (hyponatremia).  ¨ Ice chips.  ¨ Fruit juice that you have added water to (diluted fruit juice).  ¨ Low-calorie sports drinks.  · Avoid:  ¨ Alcohol.  ¨ Drinks that contain a lot of sugar. These include high-calorie sports drinks, fruit juice that is not diluted, and soda.  ¨ Caffeine.  ¨ Foods that are greasy or contain a lot of fat or sugar.  · Take over-the-counter and prescription medicines only as told by your health care provider.  · Do not take sodium tablets. This can lead to having too much sodium in the body (hypernatremia).  · Eat foods that contain a healthy balance of electrolytes, such as bananas, oranges, potatoes, tomatoes, and spinach.  · Keep all follow-up visits as told by your health care provider. This is important.  Contact a health care provider if:  · You have abdominal pain that:  ¨ Gets worse.  ¨ Stays in one area (localizes).  · You have a rash.  · You have a stiff neck.  · You are sleepier, more irritable, or more difficult to wake up than usual.  · You feel weak, dizzy, or very thirsty.  Get help right away if:  · You have:  ¨ Symptoms of severe dehydration.  ¨ A fever.  ¨ A severe headache.  ¨ Vomiting or diarrhea  that gets worse or does not go away.  ¨ Diarrhea for more than 24 hours.  ¨ Blood or green matter (bile) in your vomit.  ¨ Blood in your stool. This may cause stool to look black and tarry.  ¨ Trouble breathing.  · You cannot drink fluids without vomiting.  · Your symptoms get worse with treatment.  · You have not urinated in 6-8 hours.  · You have urinated only a small amount of very dark urine over 6-8 hours.  · You faint.  · Your heart rate while sitting still is over 100 beats a minute.  This information is not intended to replace advice given to you by your health care provider. Make sure you discuss any questions you have with your health care provider.  Document Released: 03/09/2005 Document Revised: 07/07/2017 Document Reviewed: 02/10/2017  Elsevier Interactive Patient Education © 2017 [employer]

## 2018-03-08 NOTE — ED PROVIDER NOTES
Subjective   Patient states that his home health nurse came by today thought that he looked dehydrated and thought she come to the emergency department for IV fluids.  He's not really sure if this is based off of these had no diarrhea and 1 episode of vomiting 3 days ago.  He's been able to drink fluids but states that she was insistent that he come to the ER to be evaluated.  No abdominal pain.  He has run really no other complaints.  Patient is on a diuretics what is possible is a little bit dehydrated.  Patient reports that on the chart they put that he was anxious because they picked him up he was tearful.  Patient reports he has anxiety problems but seems to be improved.      Patient is a 72 y.o. male presenting with general illness.   History provided by:  Patient   used: No    Illness   Severity:  Mild  Onset quality:  Gradual  Timing:  Constant  Progression:  Unchanged  Chronicity:  New  Associated symptoms: vomiting (one episode 3 days ago.)    Associated symptoms: no abdominal pain, no chest pain, no congestion, no cough, no diarrhea, no ear pain, no fever, no headaches, no loss of consciousness, no myalgias, no nausea, no rash, no rhinorrhea, no shortness of breath and no sore throat        Review of Systems   Constitutional: Negative for chills and fever.   HENT: Negative for congestion, ear pain, rhinorrhea and sore throat.    Respiratory: Negative for cough and shortness of breath.    Cardiovascular: Negative for chest pain.   Gastrointestinal: Positive for vomiting (one episode 3 days ago.). Negative for abdominal pain, diarrhea and nausea.   Genitourinary: Negative for dysuria, frequency and urgency.   Musculoskeletal: Negative for myalgias.   Skin: Negative for rash.   Neurological: Negative for loss of consciousness and headaches.   Psychiatric/Behavioral: Negative.    All other systems reviewed and are negative.      Past Medical History:   Diagnosis Date   • Anxiety    •  Coronary artery disease    • Depression    • Diabetes mellitus    • Diabetic retinopathy    • Hypertension    • Injury of back    • Legally blind    • Myocardial infarction    • Stroke        No Known Allergies    Past Surgical History:   Procedure Laterality Date   • CARDIAC SURGERY      cardiac stents   • CARDIAC SURGERY      CABG   • CATARACT EXTRACTION     • CORONARY ANGIOPLASTY WITH STENT PLACEMENT     • EYE SURGERY     • HIP SURGERY Right     HIP TROCANTERIC NAILING WITH INTRAMEDULLARY HIP SCREW    • HIP TROCANTERIC NAILING WITH INTRAMEDULLARY HIP SCREW Right 1/29/2018    Procedure: HIP TROCANTERIC NAILING WITH INTRAMEDULLARY HIP SCREW;  Surgeon: Titus Woody MD;  Location: [facility];  Service:    • TOE AMPUTATION         Family History   Problem Relation Age of Onset   • Hypertension Mother    • Stroke Father        Social History     Social History   • Marital status:      Social History Main Topics   • Smoking status: Current Every Day Smoker     Packs/day: 1.00     Start date: 1967   • Smokeless tobacco: Never Used   • Alcohol use No   • Drug use: No   • Sexual activity: Defer     Other Topics Concern   • Not on file           Objective   Physical Exam   Constitutional: He is oriented to person, place, and time. He appears well-developed and well-nourished.   HENT:   Head: Normocephalic and atraumatic.   Right Ear: External ear normal.   Left Ear: External ear normal.   Nose: Nose normal.   Mouth/Throat: Oropharynx is clear and moist.   Eyes: Conjunctivae are normal. No scleral icterus.   Neck: Normal range of motion. No thyromegaly present.   Cardiovascular: Normal rate, regular rhythm and normal heart sounds.    Pulmonary/Chest: Effort normal and breath sounds normal. No respiratory distress. He has no wheezes. He has no rales. He exhibits no tenderness.   Abdominal: Soft. Bowel sounds are normal. He exhibits no distension. There is no tenderness.   Musculoskeletal: Normal range of  motion.   Lymphadenopathy:     He has no cervical adenopathy.   Neurological: He is alert and oriented to person, place, and time. He has normal reflexes. He displays normal reflexes. No cranial nerve deficit. Coordination normal.   Skin: Skin is warm and dry.   Psychiatric: He has a normal mood and affect. His behavior is normal. Judgment and thought content normal.   Nursing note and vitals reviewed.      Procedures         ED Course  ED Course   Comment By Time   On discussion with the wife and the patient about his laboratory data.  He was given a small bolus of fluid.  He is not appear to have any vomiting or diarrhea since he's been here.  He is calm down is not anxious his wife is here now landed he is asked repetitively to be discharged. MATIAS Baldwin 03/07 2008        No results found for this or any previous visit (from the past 24 hour(s)).  Note: In addition to lab results from this visit, the labs listed above may include labs taken at another facility or during a different encounter within the last 24 hours. Please correlate lab times with ED admission and discharge times for further clarification of the services performed during this visit.    No orders to display     Vitals:    03/07/18 1830 03/07/18 1900 03/07/18 1959 03/07/18 2000   BP: 141/74 113/56  130/67   Patient Position:       Pulse: 68  74    Resp:   18    Temp:       TempSrc:       SpO2: 99% 99% 97%    Weight:       Height:         Medications   sodium chloride 0.9 % bolus 500 mL (0 mL Intravenous Stopped 3/7/18 1818)     ECG/EMG Results (last 24 hours)     ** No results found for the last 24 hours. **                [facility]      Final diagnoses:   Anxiety   Volume depletion            MATIAS Baldwin  03/12/18 0714

## 2018-03-12 DIAGNOSIS — S72.141D CLOSED DISPLACED INTERTROCHANTERIC FRACTURE OF RIGHT FEMUR WITH ROUTINE HEALING, SUBSEQUENT ENCOUNTER: Primary | ICD-10-CM

## 2018-03-12 RX ORDER — HYDROCODONE BITARTRATE AND ACETAMINOPHEN 5; 325 MG/1; MG/1
1 TABLET ORAL EVERY 6 HOURS PRN
Qty: 30 TABLET | Refills: 0 | Status: SHIPPED | OUTPATIENT
Start: 2018-03-12 | End: 2018-05-18

## 2018-03-19 ENCOUNTER — OFFICE VISIT (OUTPATIENT)
Dept: ORTHOPEDIC SURGERY | Facility: CLINIC | Age: 73
End: 2018-03-19

## 2018-03-19 DIAGNOSIS — Z98.890 STATUS POST HIP SURGERY: Primary | ICD-10-CM

## 2018-03-19 DIAGNOSIS — S86.811D PATELLAR TENDON RUPTURE, RIGHT, SUBSEQUENT ENCOUNTER: ICD-10-CM

## 2018-03-19 PROCEDURE — 99212 OFFICE O/P EST SF 10 MIN: CPT | Performed by: PHYSICIAN ASSISTANT

## 2018-03-19 PROCEDURE — 99024 POSTOP FOLLOW-UP VISIT: CPT | Performed by: PHYSICIAN ASSISTANT

## 2018-03-19 RX ORDER — MIRTAZAPINE 7.5 MG/1
TABLET, FILM COATED ORAL
COMMUNITY
Start: 2018-02-26 | End: 2018-05-18

## 2018-03-19 RX ORDER — ISOSORBIDE MONONITRATE 30 MG/1
30 TABLET, EXTENDED RELEASE ORAL DAILY
COMMUNITY
Start: 2018-02-21 | End: 2018-05-18 | Stop reason: SDUPTHER

## 2018-03-19 NOTE — PROGRESS NOTES
[facility] [place] [facility]opa[facility] Clinic Note    Subjective     Post-op (4 week - Right  HIP TROCANTERIC NAILING WITH INTRAMEDULLARY HIP SCREW  1/29/18)       SHARYN Moctezuma is a 72 y.o. male. Patient presents today for follow-up evaluation of right hip trochanteric nailing with intramedullary hip screw on 1/29/18 and for follow-up of his right knee partial patellar tendon rupture.  He's doing well no complaints with regards to his hip.  States the pain in his knee has resolved.  His only major complaint is the brace rubs his ankle and along the tibial tuberosity.  He denies any numbness or tingling into the extremity.  No reported fever, chills, night sweats or other constitutional symptoms.  He is currently using a walker to help assist with ambulation with the T ROM knee brace locked in extension.        Objective      Physical Exam  There were no vitals taken for this visit.    There is no height or weight on file to calculate BMI.        Ortho Exam  Right hip  Skin: Surgical incision sites are well-healed without any redness, warmth or drainage.  No evidence of infection.  Tenderness: No palpable tenderness over the incision sites or throughout the hip today.    Right knee  Skin: Grossly intact thigh redness, warmth, swelling.  No evidence of lesions.  Tenderness: No tenderness medial lateral joint lines.  Previously noted tenderness to the patellar tendon has resolved.  There was slight defect noted along the patellar tendon compared to the contralateral side.  Motion: Extension 0°, flexion 60° and then able to extend out from there.  He does note pain with flexion past 60°.  Testing: Able to perform a straight leg raise.  Neurovascular: Grossly intact L2-S1.    Imaging  Ordered right hip films today.  Reviewed by Dr. Woody.  No evidence of hardware failure, loosening or complication.  Fracture is healing well.  See chart for official report.      Assessment:  1. Status post hip surgery    2. Patellar  tendon rupture, right, subsequent encounter        Plan:  1. Discussion had with patient regarding exam, assessment and treatment options.  2. Patient will continue wearing the brace but now he will begin opening it.  Today it was set 0-30°.  Every week he can open it up by 20° as long as he remains pain free and still has the ability to extend the knee straight out.  3. He'll continue working with the physical therapist to strengthen the lower extremity.  4. May continue weightbearing as tolerated as long as he has the brace on and is using his walker.  5. He'll follow back up in 4 weeks for repeat evaluation sooner if there is any issues or concerns.  6. Questions and concerns were answered.          Ana Sampson PA-C  03/20/18  9:39 AM

## 2018-04-17 ENCOUNTER — OFFICE VISIT (OUTPATIENT)
Dept: ORTHOPEDIC SURGERY | Facility: CLINIC | Age: 73
End: 2018-04-17

## 2018-04-17 DIAGNOSIS — S86.811D PATELLAR TENDON RUPTURE, RIGHT, SUBSEQUENT ENCOUNTER: ICD-10-CM

## 2018-04-17 DIAGNOSIS — Z47.89 AFTERCARE FOLLOWING SURGERY OF THE MUSCULOSKELETAL SYSTEM: Primary | ICD-10-CM

## 2018-04-17 PROCEDURE — 99024 POSTOP FOLLOW-UP VISIT: CPT | Performed by: PHYSICIAN ASSISTANT

## 2018-04-17 PROCEDURE — 99212 OFFICE O/P EST SF 10 MIN: CPT | Performed by: PHYSICIAN ASSISTANT

## 2018-04-17 RX ORDER — CYCLOBENZAPRINE HCL 5 MG
5 TABLET ORAL NIGHTLY PRN
Qty: 15 TABLET | Refills: 0 | Status: SHIPPED | OUTPATIENT
Start: 2018-04-17 | End: 2021-01-15

## 2018-04-17 RX ORDER — LORATADINE 10 MG/1
10 TABLET ORAL DAILY
Refills: 7 | COMMUNITY
Start: 2018-03-27

## 2018-04-17 NOTE — PROGRESS NOTES
[facility] [place] Orth[facility] Surgery Clinic Note    Subjective     CC: Post-op Follow-up (4 week f/u; 3 months Right HIP TROCANTERIC NAILING WITH INTRAMEDULLARY HIP SCREW  1/29/18) and Follow-up (4 weeks Right knee partial patellar tendon rupture)      HPI    Martin Moctezuma is a 72 y.o. male. Patient returns today for follow-up evaluation of right hip trochanteric nailing with intramedullary hip screw on 29 January and for follow-up of his right knee partial patellar tendon rupture.  He has been in a T ROM brace with weekly openings to help regain range of motion.  He reports walking around his house without the brace on and having no issues with regards to pain or instability (hip or knee).  He does note some mild discomfort approximately mid thigh.  No numbness or tingling.       ROS:    Constiutional:Pt denies fever, chills, nausea, or vomiting.  MSK:as above    Objective      Past Medical History  Past Medical History:   Diagnosis Date   • Anxiety    • Coronary artery disease    • Depression    • Diabetes mellitus    • Diabetic retinopathy    • Hypertension    • Injury of back    • Legally blind    • Myocardial infarction    • Stroke          Physical Exam  There were no vitals taken for this visit.    There is no height or weight on file to calculate BMI.    Patient is well nourished and well developed.        Ortho Exam  Right hip  Skin: Surgical incision sites are well-healed without any redness, warmth or drainage.  No evidence of infection.  Compartments throughout thigh soft and compressible.  Tenderness: No palpable tenderness over the incision sites or throughout the hip today. Soreness noted mid-thigh. No palpable lumps, masses.      Right knee  Skin: Grossly intact thigh redness, warmth, swelling.  No evidence of lesions.  Tenderness: No tenderness medial lateral joint lines.    Motion: Extension 0°, flexion 110°.  Demonstrates ability to perform straight leg raise and also extending from seated position.  No  pain when performing these maneuvers.  Neurovascular: Grossly intact L2-S1.    Imaging/Labs/EMG Reviewed:  Ordered right hip films today.  Reviewed by Dr. Woody.  No evidence of hardware failure, loosening or complication.  Fracture appears to be healed.  Please see chart for official report.    Assessment:  1. Aftercare following surgery of the musculoskeletal system    2. Patellar tendon rupture, right, subsequent encounter        Plan:  1. Discussion was had the patient regarding exam, imaging, assessment and treatment options.  2. At this time he may discontinue use of his brace.  3. He is to continue working with physical therapy to strengthen his lower extremities.    4. This should include balance and gait training as well.  He currently uses a walker for ambulatory assistance recommend that he continues use of this.  5. Patient stated that he was taking a muscle relaxant night and he requested refill of this.  Upon further review it was noted that he had been taking Skelaxin.  According to the patient he no longer has this medication and is therefore not taking it.  At this time I did prescribe him 5 mg Flexeril to use at night as needed.  He was only prescribed 15 tablets.  He understands that any further need of this medication will have to be obtained through his PCP.  6. At this time he'll follow up with clinic on an as-needed basis.  7. Questions and concerns were answered.          Medical Decision Making  Management Options : prescription/IM medicine  Data/Risk: radiology tests    Ana Sampson PA-C  04/19/18  9:15 AM

## 2018-05-18 ENCOUNTER — OFFICE VISIT (OUTPATIENT)
Dept: CARDIOLOGY | Facility: HOSPITAL | Age: 73
End: 2018-05-18

## 2018-05-18 ENCOUNTER — HOSPITAL ENCOUNTER (OUTPATIENT)
Dept: CARDIOLOGY | Facility: HOSPITAL | Age: 73
Discharge: HOME | End: 2018-05-18
Attending: NURSE PRACTITIONER | Admitting: NURSE PRACTITIONER

## 2018-05-18 VITALS
SYSTOLIC BLOOD PRESSURE: 91 MMHG | WEIGHT: 126 LBS | RESPIRATION RATE: 16 BRPM | HEIGHT: 69 IN | TEMPERATURE: 98.1 F | BODY MASS INDEX: 18.66 KG/M2 | DIASTOLIC BLOOD PRESSURE: 50 MMHG | OXYGEN SATURATION: 98 % | HEART RATE: 72 BPM

## 2018-05-18 DIAGNOSIS — I25.5 ISCHEMIC CARDIOMYOPATHY: ICD-10-CM

## 2018-05-18 DIAGNOSIS — Z79.4 TYPE 2 DIABETES MELLITUS WITH COMPLICATION, WITH LONG-TERM CURRENT USE OF INSULIN (HCC): ICD-10-CM

## 2018-05-18 DIAGNOSIS — I25.708 CORONARY ARTERY DISEASE OF BYPASS GRAFT OF NATIVE HEART WITH STABLE ANGINA PECTORIS (HCC): Primary | ICD-10-CM

## 2018-05-18 DIAGNOSIS — I10 ESSENTIAL HYPERTENSION: ICD-10-CM

## 2018-05-18 DIAGNOSIS — E78.5 HYPERLIPIDEMIA, UNSPECIFIED HYPERLIPIDEMIA TYPE: ICD-10-CM

## 2018-05-18 DIAGNOSIS — E11.8 TYPE 2 DIABETES MELLITUS WITH COMPLICATION, WITH LONG-TERM CURRENT USE OF INSULIN (HCC): ICD-10-CM

## 2018-05-18 DIAGNOSIS — Z72.0 TOBACCO ABUSE: ICD-10-CM

## 2018-05-18 DIAGNOSIS — R06.09 DOE (DYSPNEA ON EXERTION): ICD-10-CM

## 2018-05-18 PROCEDURE — 99204 OFFICE O/P NEW MOD 45 MIN: CPT | Performed by: NURSE PRACTITIONER

## 2018-05-18 PROCEDURE — 93005 ELECTROCARDIOGRAM TRACING: CPT | Performed by: NURSE PRACTITIONER

## 2018-05-18 PROCEDURE — 93010 ELECTROCARDIOGRAM REPORT: CPT | Performed by: INTERNAL MEDICINE

## 2018-05-18 PROCEDURE — 99406 BEHAV CHNG SMOKING 3-10 MIN: CPT | Performed by: NURSE PRACTITIONER

## 2018-05-18 RX ORDER — ISOSORBIDE MONONITRATE 60 MG/1
60 TABLET, EXTENDED RELEASE ORAL DAILY
Qty: 90 TABLET | Refills: 0 | Status: SHIPPED | OUTPATIENT
Start: 2018-05-18 | End: 2021-01-15

## 2018-05-18 RX ORDER — ISOSORBIDE MONONITRATE 60 MG/1
60 TABLET, EXTENDED RELEASE ORAL DAILY
Qty: 30 TABLET | Refills: 1 | Status: SHIPPED | OUTPATIENT
Start: 2018-05-18 | End: 2018-05-18 | Stop reason: SDUPTHER

## 2018-05-18 RX ORDER — INSULIN GLARGINE 100 [IU]/ML
INJECTION, SOLUTION SUBCUTANEOUS
COMMUNITY
End: 2020-12-11 | Stop reason: SDUPTHER

## 2018-05-18 NOTE — PATIENT INSTRUCTIONS
Hold lasix for now and take as needed for increased shortness of breath, swelling or weight gain of 3lbs in one day    Increase imdur to 60mg daily.  If you feel more dizzy or blood pressure gets too low (less than 100) then go back to the 30mg and call Dr. Vernon's office for samples of Ranexa

## 2018-05-18 NOTE — TELEPHONE ENCOUNTER
Last seen today, 5/18/18, and isosorbide was increased to 60mg. Patient's insurance requires 90-day supply, this was sent to [facility] on [address].    Ale Honrer, EjD

## 2018-05-18 NOTE — PROGRESS NOTES
"Encounter Date:05/18/2018      Patient ID: Martin Moctezuma is a 72 y.o. male.        Subjective:     Chief Complaint: Establish Care CAD     History of Present Illness  Mr. Moctezuma is a 73 YO M with CAD s/p CABG, PAD, DM, HTN, HLP, diabetic retinopathy with legal blindness and CVA who [facility] as a self [facility] for chest pain. Patient has been followed by Dr. Vernon at [facility] and 3 of 4 bypasses are occluded per last [facility] in November per notes (actual procedure note unavailable at this time). EF was 45% at the time per records. Patient reports on Wednesday he \"snuck out\" of the house to walk and get cigarettes and developed midsternal chest discomfort. Pain was relieved by rest after 5-10 minutes but he had to stop 3 times. He denies shortness of breath or palpitations. He called his cardiologist, Dr. Vernon, and couldn't get a hold of him so he came here. He has had no further episodes like the one on Wednesday but reports having exertional chest pain about twice a week for awhile now. Always relieved by rest. He has taken nitro twice in the last 6 months. He did not take any Wednesday because he didn't have it with him. He had a history of retinal hemorrhage and reports that he could not have interventions in the past because he was told not to take blood thinners. He is on ASA. Patient smokes about 17 cigarettes a day and drinks 7-8 cups of coffee a day with about 16-32 oz of water. He reports dizziness and frequent falls. He has suffered 5 fractures in the past year due to falling. He is very dizzy when he stands and has lost about 17 lbs. He is depressed due to legal blindness.     Patient Active Problem List   Diagnosis   • Ataxia   • Coronary artery disease   • Diabetes mellitus   • Hypertension   • Diabetic retinopathy   • Legally blind   • Anemia   • Closed fracture of right hip   • Tobacco dependence   • Urinary retention         Past Surgical History:   Procedure Laterality Date "   • CARDIAC SURGERY  01/12/2012    cardiac stents   • CARDIAC SURGERY  08/28/2013    CABG   • CATARACT EXTRACTION     • CORONARY ANGIOPLASTY WITH STENT PLACEMENT  01/25/2012    [facility] in [place]   • EYE SURGERY     • HIP SURGERY Right     HIP TROCANTERIC NAILING WITH INTRAMEDULLARY HIP SCREW    • HIP TROCANTERIC NAILING WITH INTRAMEDULLARY HIP SCREW Right 1/29/2018    Procedure: HIP TROCANTERIC NAILING WITH INTRAMEDULLARY HIP SCREW;  Surgeon: Titus Woody MD;  Location: [facility];  Service:    • TOE AMPUTATION Right     1st toe       No Known Allergies      Current Outpatient Prescriptions:   •  aspirin 81 MG EC tablet, Take 81 mg by mouth Daily., Disp: , Rfl:   •  bisoprolol (ZEBeta) 5 MG tablet, Take 5 mg by mouth Daily., Disp: , Rfl:   •  cyclobenzaprine (FLEXERIL) 5 MG tablet, Take 1 tablet by mouth At Night As Needed for Muscle Spasms., Disp: 15 tablet, Rfl: 0  •  docusate sodium (COLACE) 100 MG capsule, Take 1 capsule by mouth 2 (Two) Times a Day As Needed for constipation., Disp: 60 capsule, Rfl: 0  •  dorzolamide (TRUSOPT) 2 % ophthalmic solution, Apply 1 drop to eye 2 (Two) Times a Day., Disp: , Rfl:   •  fludrocortisone 0.1 MG tablet, Take 0.1 mg by mouth Daily., Disp: , Rfl:   •  fluticasone-salmeterol (ADVAIR) 250-50 MCG/DOSE DISKUS, Inhale 2 (Two) Times a Day., Disp: , Rfl:   •  furosemide (LASIX) 20 MG tablet, Take 1 tablet by mouth Daily., Disp: , Rfl:   •  insulin glargine (LANTUS) 100 UNIT/ML injection, Inject  under the skin. 10 units in the AM and 6 in the PM, Disp: , Rfl:   •  insulin lispro (humaLOG) 100 UNIT/ML injection, Inject 0-9 Units under the skin 4 (Four) Times a Day With Meals & at Bedtime., Disp: , Rfl: 12  •  isosorbide mononitrate (IMDUR) 30 MG 24 hr tablet, Take 30 mg by mouth Daily., Disp: , Rfl:   •  latanoprost (XALATAN) 0.005 % ophthalmic solution, 1 drop Every Night., Disp: , Rfl:   •  loratadine (CLARITIN) 10 MG tablet, Take 10 mg by mouth Daily.,  Disp: , Rfl: 7  •  nitroglycerin (NITROSTAT) 0.4 MG SL tablet, Place 0.4 mg under the tongue Every 5 (Five) Minutes As Needed for Chest Pain. Take no more than 3 doses in 15 minutes., Disp: , Rfl:   •  pantoprazole (PROTONIX) 40 MG EC tablet, Take 40 mg by mouth Daily., Disp: , Rfl:   •  Pediatric Multivitamins-Iron (FLINTSTONES PLUS IRON) chewable tablet, Chew 1 tablet Daily., Disp: , Rfl:   •  pentoxifylline (TRENtal) 400 MG CR tablet, Take 400 mg by mouth 3 (Three) Times a Day With Meals., Disp: , Rfl:   •  polyethylene glycol (MIRALAX) packet, Take 17 g by mouth Daily., Disp: , Rfl:   •  potassium chloride (K-DUR) 10 MEQ CR tablet, Take 10 mEq by mouth., Disp: , Rfl:   •  rosuvastatin (CRESTOR) 20 MG tablet, Take 40 mg by mouth Daily., Disp: , Rfl:   •  sertraline (ZOLOFT) 100 MG tablet, Take 100 mg by mouth daily., Disp: , Rfl:   •  tamsulosin (FLOMAX) 0.4 MG capsule 24 hr capsule, Take 1 capsule by mouth Daily., Disp: 30 capsule, Rfl:   •  vitamin B-12 (VITAMIN B-12) 1000 MCG tablet, Take 1 tablet by mouth Daily., Disp: , Rfl:     The following portions of the chart were reviewed and updated as appropriate: Allergies, current medications, past family history, social history, past medical history.     Review of Systems   Constitution: Positive for weakness, malaise/fatigue and weight loss. Negative for chills, decreased appetite, diaphoresis, fever, night sweats and weight gain.   HENT: Positive for hearing loss. Negative for congestion and nosebleeds.    Eyes: Negative for blurred vision, visual disturbance and visual halos.   Cardiovascular: Positive for chest pain, dyspnea on exertion and irregular heartbeat. Negative for claudication, cyanosis, leg swelling, near-syncope, orthopnea, palpitations, paroxysmal nocturnal dyspnea and syncope.   Respiratory: Positive for shortness of breath, snoring and sputum production. Negative for cough, hemoptysis, sleep disturbances due to breathing and wheezing.   "  Endocrine: Positive for polyuria. Negative for cold intolerance, heat intolerance, polydipsia and polyphagia.   Hematologic/Lymphatic: Does not bruise/bleed easily.   Skin: Negative for dry skin, itching and rash.   Musculoskeletal: Positive for falls, joint pain, muscle cramps and muscle weakness. Negative for joint swelling and myalgias.   Gastrointestinal: Positive for constipation and diarrhea. Negative for bloating, abdominal pain, dysphagia, heartburn, melena, nausea and vomiting.   Genitourinary: Positive for nocturia. Negative for dysuria, flank pain and hematuria.   Neurological: Positive for excessive daytime sleepiness, dizziness, light-headedness and loss of balance. Negative for difficulty with concentration and headaches.   Psychiatric/Behavioral: Positive for depression. Negative for altered mental status. The patient has insomnia and is nervous/anxious.    Allergic/Immunologic: Positive for environmental allergies.           Objective:     Vitals:    05/18/18 0925 05/18/18 0932 05/18/18 0934   BP: 123/58 127/60 91/50   BP Location: Right arm Left arm Left arm   Patient Position: Sitting Sitting Standing   Pulse: 73 72 72   Resp: 16     Temp: 98.1 °F (36.7 °C)     TempSrc: Temporal Artery      SpO2: 98%     Weight: 57.2 kg (126 lb)     Height: 175.3 cm (69\")           Physical Exam   Constitutional: He is oriented to person, place, and time. He appears well-developed and well-nourished. No distress.   HENT:   Head: Normocephalic.   Eyes: Conjunctivae are normal. Pupils are equal, round, and reactive to light.   Neck: Neck supple. No JVD present. Carotid bruit is present (bilateral). No thyromegaly present.   Cardiovascular: Normal rate, regular rhythm and intact distal pulses.  Exam reveals decreased pulses. Exam reveals no gallop and no friction rub.    Murmur (systolic murmur grade II/IV) heard.  Pulmonary/Chest: Effort normal. No respiratory distress. He has decreased breath sounds in the right " middle field, the right lower field, the left middle field and the left lower field. He has no wheezes. He has no rales. He exhibits no tenderness.   Abdominal: Soft. Bowel sounds are normal.   Musculoskeletal: Normal range of motion. He exhibits no edema.   Neurological: He is alert and oriented to person, place, and time.   Skin: Skin is warm and dry.   Psychiatric: He has a normal mood and affect. His behavior is normal. Thought content normal.   Vitals reviewed.      Lab and Diagnostic Review:    Reviewed records from  cardiology.  Last left heart catheter report received was in 2013 which she received a percutaneous intervention to his diagonal and left circumflex..  However, according to office note there was a heart catheter done on 11/14/17 which showed a patent LIMA to LAD.  SVG to RCA and SVG to OM 2 occluded SVG to first diagonal also occluded with EF of 45%.     EKG shows NSR, LAD, septal infarct. No acute ST/T wave changes    Assessment and Plan:         1. Coronary artery disease of bypass graft of native heart with stable angina pectoris  - Known 3 of 4 grafts occluded  - Increase imdur to 60mg daily. If angina persists or if he is unable to tolerate due to hypotension/dizziness, recommend Ranexa  - Long discussion regarding angina and educational handouts provided. Discussed medical management vs percutaneous intervention. At this time I do not know if PCI is an option. Due to recent stress in January 2016 and cath in 6 months ago will not do stress test. I offered him medical management and referral to interventionalist. He would like to continue to see Dr. Vernon  - ECG 12 Lead; Future    2. Essential hypertension  - Prone to hypotension. He is off ACEI, on low dose BB    3. Hyperlipidemia, unspecified hyperlipidemia type  - On statin therapy    4. Type 2 diabetes mellitus with complication, with long-term current use of insulin  - Significant circulatory and opthalmologic complications    5.  Ischemic cardiomyopathy  - LVEF 45% per records  - Patient appears to be dehydrated. Advised to hold lasix and take as needed or take every other day  - Increase fluid content  - Recent ED visit with volume depletion    6. Tobacco abuse  - Smoking cessation counseling provided for 6 minutes.  Discussed risks of ongoing tobacco abuse including cardiovascular disease, stroke, lung cancer, high blood pressure and death    Patient would like to follow up his established cardiologist Dr. Vernon and plans to call him today for an appointment    FU with [facility]    It has been a pleasure to participate in the care of this patient.  Patient was instructed to call the [facility] with any questions, concerns, or worsening symptoms.      * Please note that portions of this note were completed with a voice recognition program. Efforts were made to edit the dictation but occasionally words are transcribed.

## 2019-05-16 ENCOUNTER — TELEPHONE (OUTPATIENT)
Dept: ORTHOPEDIC SURGERY | Facility: CLINIC | Age: 74
End: 2019-05-16

## 2019-05-16 DIAGNOSIS — Z47.89 AFTERCARE FOLLOWING SURGERY OF THE MUSCULOSKELETAL SYSTEM: Primary | ICD-10-CM

## 2019-05-16 DIAGNOSIS — Z98.890 STATUS POST HIP SURGERY: ICD-10-CM

## 2019-05-16 NOTE — TELEPHONE ENCOUNTER
PATIENT CALLED BACK AND STATED THAT HE WANTED TO USE [facility]. HE ALSO STATED THAT HE WOULD LIKE TO HAVE LYNDA. HE HAD HER BEFORE. HE CAN BE REACHED [phone].

## 2019-05-16 NOTE — TELEPHONE ENCOUNTER
Left message for the patient and told him the PT order is in his chart and if chooses to go to a [facility] facility they can look at the order, but if he wants to go outside of [facility] to call me back with the facility name and I can fax them the order. Brandie

## 2019-05-16 NOTE — TELEPHONE ENCOUNTER
PATIENT CAME BY OFFICE TO SEE IF HE COULD GET AN ORDER FOR PHYSICAL THERAPY. HE STATES THAT HE WILL BE GETTING A GUIDE DOG SOON AND FEELS HE NEEDS TO STRENGTHEN HIS KNEE AND HIP BEFORE DOING SO. HE CAN BE REACHED [phone].

## 2019-05-28 ENCOUNTER — TELEPHONE (OUTPATIENT)
Dept: ORTHOPEDIC SURGERY | Facility: CLINIC | Age: 74
End: 2019-05-28

## 2019-05-28 NOTE — TELEPHONE ENCOUNTER
Nancy from [facility] called and reported a non-accident fall. The patient is okay, she just wanted to report it.

## 2020-06-23 ENCOUNTER — HOSPITAL ENCOUNTER (EMERGENCY)
Facility: HOSPITAL | Age: 75
Discharge: LEFT BEFORE DISCHARGE | End: 2020-06-23
Attending: EMERGENCY MEDICINE | Admitting: EMERGENCY MEDICINE

## 2020-06-23 VITALS
WEIGHT: 142 LBS | TEMPERATURE: 98.2 F | DIASTOLIC BLOOD PRESSURE: 92 MMHG | SYSTOLIC BLOOD PRESSURE: 189 MMHG | BODY MASS INDEX: 21.52 KG/M2 | OXYGEN SATURATION: 98 % | HEART RATE: 66 BPM | RESPIRATION RATE: 18 BRPM | HEIGHT: 68 IN

## 2020-06-23 LAB — GLUCOSE BLDC GLUCOMTR-MCNC: 117 MG/DL (ref 70–130)

## 2020-06-23 PROCEDURE — 82962 GLUCOSE BLOOD TEST: CPT

## 2020-06-23 PROCEDURE — 99211 OFF/OP EST MAY X REQ PHY/QHP: CPT

## 2020-06-23 RX ORDER — SODIUM CHLORIDE 0.9 % (FLUSH) 0.9 %
10 SYRINGE (ML) INJECTION AS NEEDED
Status: DISCONTINUED | OUTPATIENT
Start: 2020-06-23 | End: 2020-06-23 | Stop reason: HOSPADM

## 2020-06-23 NOTE — ED PROVIDER NOTES
Patient told nurses he want to leave prior to medical evaluation, understanding is place himself in a risk by doing so.  Patient was not fully evaluated by myself as he decided to leave against our advice prior to our work-up and full examination and interview.  Patient left AMA/eloped.     Thomas Perez, DO  06/23/20 0238

## 2020-07-14 ENCOUNTER — OFFICE VISIT (OUTPATIENT)
Dept: ORTHOPEDIC SURGERY | Facility: CLINIC | Age: 75
End: 2020-07-14

## 2020-07-14 VITALS — OXYGEN SATURATION: 97 % | HEIGHT: 68 IN | BODY MASS INDEX: 21.52 KG/M2 | HEART RATE: 80 BPM | WEIGHT: 141.98 LBS

## 2020-07-14 DIAGNOSIS — G89.29 CHRONIC PAIN OF RIGHT KNEE: ICD-10-CM

## 2020-07-14 DIAGNOSIS — M54.2 CERVICALGIA: ICD-10-CM

## 2020-07-14 DIAGNOSIS — Z98.890 STATUS POST HIP SURGERY: Primary | ICD-10-CM

## 2020-07-14 DIAGNOSIS — Z47.89 AFTERCARE FOLLOWING SURGERY OF THE MUSCULOSKELETAL SYSTEM: ICD-10-CM

## 2020-07-14 DIAGNOSIS — M25.561 CHRONIC PAIN OF RIGHT KNEE: ICD-10-CM

## 2020-07-14 PROCEDURE — 99214 OFFICE O/P EST MOD 30 MIN: CPT | Performed by: ORTHOPAEDIC SURGERY

## 2020-07-14 NOTE — PROGRESS NOTES
"    [facility] [place] [facility] [place]        Subjective     CC: Follow-up of the Right Knee (Patellar tendon rupture) and Follow-up (15 months- s/p Right HIP TROCANTERIC NAILING WITH INTRAMEDULLARY HIP SCREW  1/29/18)      SHARYN Moctezuma is a 74 y.o. male.  Patient here for new problem today regarding his cervical spine.  In January 2018, he had an inner troches fracture treated with an IM hip screw.  He is here with a family member.  He tells me he has the sensation that the knee is going to give way.  He has fallen multiple times on the right knee.  He was diagnosed a while back with a partial patella tendon rupture.  Patient is also having some issues with his neck in terms of tightness.  Denies numbness or tingling.  Denies bowel or bladder disturbance.  He has just started physical therapy.        ROS:    Constiutional:Pt denies fever, chills, nausea, or vomiting.  MSK:as above        Objective      Past Medical History  Past Medical History:   Diagnosis Date   • Anxiety    • Coronary artery disease    • Depression    • Diabetes mellitus (CMS/[facility])    • Diabetes type 1, controlled (CMS/HCC)    • Diabetic retinopathy (CMS/HCC)    • Hypertension    • Injury of back    • Legally blind    • Myocardial infarction (CMS/HCC)    • Stroke (CMS/HCC)          Physical Exam  Pulse 80   Ht 172.7 cm (67.99\")   Wt 64.4 kg (141 lb 15.6 oz)   SpO2 97%   BMI 21.59 kg/m²     Body mass index is 21.59 kg/m².    Patient is well nourished and well developed.        Ortho Exam  Patient has stiffness in the cervical spine.  He has no weakness with biceps, triceps, deltoid, wrist extensors wrist flexors or dorsal interossei.  He is tender palpation about the parascapular's on the right.    Right lower extremity: Patient has no pain with passive range of motion of the hip.  He has 5 out of 5 quad strength.  No palpable defects noted in the quad or patella tendon.  He has fairly significant quad atrophy " noted.    Imaging/Labs/EMG Reviewed:  Imaging Results (Last 24 Hours)     Procedure Component Value Units Date/Time    XR Spine Cervical 2 View [[id number]] Resulted:  07/14/20 1426     Updated:  07/14/20 1427    Narrative:       Cervical Spine X-Ray    Indication: Pain    Views: AP and Lateral    Comparison: None    Findings:  No fracture  No bony lesions  Soft tissues normal  Significant loss of disc space height is noted throughout the cervical   spine.  Alignment is grossly normal.  We are able to see clearly to C7.    Impression: No acute bony abnormality noted.  Degenerative changes   throughout the cervical spine.          XR Femur 2 View Right [[id number]] Resulted:  07/14/20 1425     Updated:  07/14/20 1426    Narrative:       Right Femur X-Ray    Indication: Pain    Views:  AP and Lateral views    Comparison: Right hip 4/17/2018    Findings:  Patient is status post ORIF of a intertrochanteric femur fracture using an   intramedullary hip screw device.  The implant is intact.  The fracture has   healed.  There is no evidence of hip screw cut out.  There are multiple   surgical clips noted in the medial and distal thigh from prior surgery.    There are calcifications noted throughout the vasculature of the right   lower extremity as well.  No bony lesion  Normal soft tissues  Normal joint spaces    Impression: Status post intramedullary hip screw for intertrochanteric   femur fracture.  Fracture has healed.  The implant is intact.  No acute   bony abnormalities noted.  Multiple surgical clips noted in the soft   tissue of the thigh along with calcification of the vasculature.                  Assessment    Assessment:  1. Status post hip surgery    2. Aftercare following surgery of the musculoskeletal system    3. Chronic pain of right knee    4. Cervicalgia        Plan:  1. Recommend over the counter anti-inflammatories for pain and/or swelling  2. Cervicalgia--agree with physical therapy.  3. Thigh pain after  short IM nail for intertrochanteric femur fracture--we discussed treatment options and alternatives.  At this point, does not appear that the patient has a fracture below the nail.  His implant remains intact.  His partial-thickness patella tendon rupture does not appear to be significant because of his instability.  He has very good strength with extensor mechanism testing.  I believe he is got some quad atrophy and some thigh pain secondary to his stiff implant.  I have recommended he work on quadricep strengthening at physical therapy.  Follow-up with me as needed      Titus Woody MD  07/14/20  14:47

## 2020-10-12 ENCOUNTER — TELEPHONE (OUTPATIENT)
Dept: ENDOCRINOLOGY | Facility: CLINIC | Age: 75
End: 2020-10-12

## 2020-10-12 DIAGNOSIS — E11.3553 TYPE 2 DIABETES MELLITUS WITH STABLE PROLIFERATIVE RETINOPATHY OF BOTH EYES, WITH LONG-TERM CURRENT USE OF INSULIN (HCC): Primary | ICD-10-CM

## 2020-10-12 DIAGNOSIS — Z79.4 TYPE 2 DIABETES MELLITUS WITH STABLE PROLIFERATIVE RETINOPATHY OF BOTH EYES, WITH LONG-TERM CURRENT USE OF INSULIN (HCC): Primary | ICD-10-CM

## 2020-10-12 RX ORDER — INSULIN LISPRO 100 [IU]/ML
INJECTION, SOLUTION INTRAVENOUS; SUBCUTANEOUS
Qty: 7 PEN | Refills: 3 | Status: SHIPPED | OUTPATIENT
Start: 2020-10-12

## 2020-11-20 ENCOUNTER — TELEPHONE (OUTPATIENT)
Dept: ENDOCRINOLOGY | Facility: CLINIC | Age: 75
End: 2020-11-20

## 2020-11-20 NOTE — TELEPHONE ENCOUNTER
PATIENT IS REQUESTING TO HAVE A LETTER FOR HIS DIABETIC SHOES SENT TO [facility]. HE HAS AN APPT SCHEDULED WITH [facility] ON Monday AND WILL NOT BE ABLE TO GET HIS SHOES WITHOUT THE APPROPRIATE INFORMATION.     CALL BACK [phone]

## 2020-11-20 NOTE — TELEPHONE ENCOUNTER
LMOM advising pt. That we sent paperwork to [facility] (formerly [facility])[3] this am. JONNIE

## 2020-12-11 RX ORDER — INSULIN GLARGINE 100 [IU]/ML
INJECTION, SOLUTION SUBCUTANEOUS
Qty: 30 ML | Refills: 1 | Status: SHIPPED | OUTPATIENT
Start: 2020-12-11

## 2020-12-18 NOTE — PROGRESS NOTES
"Orthopaedic Surgery Progress Note      LOS: 4 days   Patient Care Team:  Provider Not In System as PCP - General    POD 4    Subjective     Interval History:   Patient is refusing chemical DVT prophylaxis due to his eyesight.  His urination and constipation have improved.  His pain is relatively well managed.      Objective     Vital Signs:  Temp (24hrs), Av.7 °F (37.1 °C), Min:98.2 °F (36.8 °C), Max:99.4 °F (37.4 °C)    /66 (BP Location: Right arm, Patient Position: Sitting)  Pulse 69  Temp 98.3 °F (36.8 °C) (Temporal Artery )   Resp 16  Ht 172.7 cm (68\")  Wt 64.9 kg (143 lb)  SpO2 92%  BMI 21.74 kg/m2    Labs:  Lab Results (last 24 hours)     Procedure Component Value Units Date/Time    POC Glucose Once [[id number]]  (Abnormal) Collected:  18 155    Specimen:  Blood Updated:  18 155     Glucose 191 (H) mg/dL     Narrative:       Meter: [id number] : [id number] Derek Perla    POC Glucose Once [[id number]]  (Abnormal) Collected:  18    Specimen:  Blood Updated:  18     Glucose 229 (H) mg/dL     Narrative:       Verify with Lab Meter: [id number] : [id number] Kike Palomino    Hemoglobin & Hematocrit, Blood [[id number]]  (Abnormal) Collected:  18    Specimen:  Blood Updated:  18     Hemoglobin 7.4 (L) g/dL      Hematocrit 22.9 (L) %     Basic Metabolic Panel [[id number]]  (Abnormal) Collected:  18    Specimen:  Blood Updated:  18 0646     Glucose 65 (L) mg/dL      BUN 31 (H) mg/dL      Creatinine 1.40 (H) mg/dL      Sodium 131 (L) mmol/L      Potassium 4.4 mmol/L      Chloride 100 mmol/L      CO2 25.0 mmol/L      Calcium 8.2 (L) mg/dL      eGFR Non African Amer 50 (L) mL/min/1.73      BUN/Creatinine Ratio 22.1     Anion Gap 6.0 mmol/L     Narrative:       [employer] Guidelines    Stage     Description        GFR  1         Normal or High     90+  2         Mild decrease      60-89  3         Moderate " decrease  30-59  4         Severe decrease    15-29  5         Kidney failure     <15    POC Glucose Once [[id number]]  (Normal) Collected:  02/02/18 0755    Specimen:  Blood Updated:  02/02/18 0756     Glucose 93 mg/dL     Narrative:       Meter: [id number] : [id number] Nadine Juarez    POC Glucose Once [[id number]]  (Abnormal) Collected:  02/02/18 1115    Specimen:  Blood Updated:  02/02/18 1120     Glucose 185 (H) mg/dL     Narrative:       Meter: [id number] : [id number] Russrubin Rebecca          Physical Exam:    Stable.      POD #4 s/p trochanteric nail for right proximal femur fracture      PT and OT--patient may be weightbearing as tolerated on the right lower extremity at all times.      SCD's for DVTpx.  Pt refusing chemical dvt px        Discontinue fascia iliaca block prior to discharge.      Acute blood loss anemia--Hct currently 23.  Patient asymptomatic.       D/c planning--pt will need SNF.   Referral has been made to [facility].        Bowel protocol      Urinary retention improved      Upon discharge, patient will need staples removed 12-14 days after surgery.  Continue Aquacel dressings until its time to remove the staples.  After staple removal, patient will need a dry dressing over his incisions at all times.  Continue Lovenox for 6 weeks total.  Follow-up with me in 3 weeks approximately.  Patient may weight-bear as tolerated on the right lower extremity.  No hip precautions necessary.      Titus Woody MD  02/02/18  12:23 PM       none

## 2021-01-15 ENCOUNTER — OFFICE VISIT (OUTPATIENT)
Dept: GASTROENTEROLOGY | Facility: CLINIC | Age: 76
End: 2021-01-15

## 2021-01-15 VITALS
TEMPERATURE: 97.7 F | HEIGHT: 68 IN | DIASTOLIC BLOOD PRESSURE: 87 MMHG | BODY MASS INDEX: 20.7 KG/M2 | SYSTOLIC BLOOD PRESSURE: 186 MMHG | HEART RATE: 78 BPM | WEIGHT: 136.6 LBS

## 2021-01-15 DIAGNOSIS — R19.7 DIARRHEA, UNSPECIFIED TYPE: Primary | ICD-10-CM

## 2021-01-15 PROCEDURE — 99213 OFFICE O/P EST LOW 20 MIN: CPT | Performed by: NURSE PRACTITIONER

## 2021-01-15 RX ORDER — ECHINACEA PURPUREA EXTRACT 125 MG
1 TABLET ORAL TAKE AS DIRECTED
COMMUNITY
Start: 2020-12-29

## 2021-01-15 RX ORDER — LISINOPRIL 5 MG/1
1 TABLET ORAL DAILY
COMMUNITY
Start: 2020-11-13

## 2021-01-15 RX ORDER — CHOLESTYRAMINE 4 G/9G
4 POWDER, FOR SUSPENSION ORAL 2 TIMES DAILY WITH MEALS
Qty: 378 G | Refills: 11 | Status: SHIPPED | OUTPATIENT
Start: 2021-01-15

## 2021-01-15 RX ORDER — POTASSIUM CHLORIDE 20 MEQ/1
1 TABLET, EXTENDED RELEASE ORAL DAILY
COMMUNITY
Start: 2020-11-30

## 2021-01-15 RX ORDER — VENLAFAXINE HYDROCHLORIDE 75 MG/1
1 CAPSULE, EXTENDED RELEASE ORAL DAILY
COMMUNITY
Start: 2020-10-11

## 2021-01-15 NOTE — PROGRESS NOTES
"GASTROENTEROLOGY OFFICE NOTE  Martin Moctezuma  [id number]  1945    CARE TEAM  Patient Care Team:  Shanti Stubbs APRN as PCP - General (Nurse Practitioner)    Referring Provider: Shanti Stubbs APRN    Chief Complaint   Patient presents with   • Gas   • Diarrhea        HISTORY OF PRESENT ILLNESS:  Mr. Moctezuma is a 75-year-old male seen today with complaints of diarrhea and extreme gas.  He reports he has 3-4 loose bowel movements daily, he takes an OTC antidiarrheal medication which stops his diarrhea and he will not have any bowel movement for several days but passes a lot of gas.  He denies blood in his stool or associated abdominal cramping or pain.      He reports that he was diagnosed with colitis in 2012 in [place] by Dr. Willett.  He does not recall what type of colitis he does remember being treated with a very small pill that was too expensive for him to afford and he was given samples.  He recalls taking 3 pills daily, Entocort sounds familiar to him but he is not sure.  His diarrhea resolved at that time but within a couple of years his symptoms returned.  He reports that he has had at least 5 colonoscopies over the last several years for the same complaints but \"they never find anything\".  His last colonoscopy was in 2019 at [facility], a few polyps were removed but he reports they found nothing else.      PAST MEDICAL HISTORY  Past Medical History:   Diagnosis Date   • Anxiety    • Coronary artery disease    • Depression    • Diabetes mellitus (CMS/[facility])    • Diabetes type 1, controlled (CMS/[facility])    • Diabetic retinopathy (CMS/[facility])    • Hypertension    • Injury of back    • Legally blind    • Myocardial infarction (CMS/[facility])    • Stroke (CMS/[facility])         PAST SURGICAL HISTORY  Past Surgical History:   Procedure Laterality Date   • CARDIAC SURGERY  01/12/2012    cardiac stents   • CARDIAC SURGERY  08/28/2013    CABG   • CATARACT EXTRACTION     • CORONARY ANGIOPLASTY WITH STENT PLACEMENT  01/25/2012    " [facility] in [place]   • EYE SURGERY     • HIP SURGERY Right     HIP TROCANTERIC NAILING WITH INTRAMEDULLARY HIP SCREW    • HIP TROCHANTERIC NAILING WITH INTRAMEDULLARY HIP SCREW Right 1/29/2018    Procedure: HIP TROCANTERIC NAILING WITH INTRAMEDULLARY HIP SCREW;  Surgeon: Titus Woody MD;  Location: [facility];  Service:    • TOE AMPUTATION Right     1st toe        MEDICATIONS:    Current Outpatient Medications:   •  aspirin 81 MG EC tablet, Take 81 mg by mouth Daily., Disp: , Rfl:   •  bisoprolol (ZEBeta) 5 MG tablet, Take 5 mg by mouth Daily., Disp: , Rfl:   •  cholestyramine (QUESTRAN) 4 GM/DOSE powder, Take 1 packet by mouth 2 (Two) Times a Day With Meals. mixed with a liquid, Disp: 378 g, Rfl: 11  •  docusate sodium (COLACE) 100 MG capsule, Take 1 capsule by mouth 2 (Two) Times a Day As Needed for constipation., Disp: 60 capsule, Rfl: 0  •  dorzolamide (TRUSOPT) 2 % ophthalmic solution, Apply 1 drop to eye 2 (Two) Times a Day., Disp: , Rfl:   •  fludrocortisone 0.1 MG tablet, Take 0.1 mg by mouth Daily., Disp: , Rfl:   •  fluticasone-salmeterol (ADVAIR) 250-50 MCG/DOSE DISKUS, Inhale 2 (Two) Times a Day., Disp: , Rfl:   •  furosemide (LASIX) 20 MG tablet, Take 1 tablet by mouth Daily., Disp: , Rfl:   •  Insulin Lispro, 1 Unit Dial, (HumaLOG KwikPen) 100 UNIT/ML solution pen-injector, 5 units at breakfast , 8 units at lunch and supper, Disp: 7 pen, Rfl: 3  •  Lantus SoloStar 100 UNIT/ML injection pen, INJECT SUBCUTANEOUSLY 15  UNITS EVERY MORNING AND 10  UNITS EVERY EVENING, Disp: 30 mL, Rfl: 1  •  latanoprost (XALATAN) 0.005 % ophthalmic solution, 1 drop Every Night., Disp: , Rfl:   •  lisinopril (PRINIVIL,ZESTRIL) 5 MG tablet, Take 1 tablet by mouth Daily., Disp: , Rfl:   •  loratadine (CLARITIN) 10 MG tablet, Take 10 mg by mouth Daily., Disp: , Rfl: 7  •  nitroglycerin (NITROSTAT) 0.4 MG SL tablet, Place 0.4 mg under the tongue Every 5 (Five) Minutes As Needed for Chest Pain. Take no  more than 3 doses in 15 minutes., Disp: , Rfl:   •  pantoprazole (PROTONIX) 40 MG EC tablet, Take 40 mg by mouth Daily., Disp: , Rfl:   •  Pediatric Multivitamins-Iron (FLINTSTONES PLUS IRON) chewable tablet, Chew 1 tablet Daily., Disp: , Rfl:   •  potassium chloride (K-DUR,KLOR-CON) 20 MEQ CR tablet, Take 1 tablet by mouth Daily., Disp: , Rfl:   •  rosuvastatin (CRESTOR) 20 MG tablet, Take 40 mg by mouth Daily., Disp: , Rfl:   •  sodium chloride 0.65 % nasal spray, 1 spray into the nostril(s) as directed by provider Take As Directed., Disp: , Rfl:   •  tamsulosin (FLOMAX) 0.4 MG capsule 24 hr capsule, Take 1 capsule by mouth Daily., Disp: 30 capsule, Rfl:   •  venlafaxine XR (EFFEXOR-XR) 75 MG 24 hr capsule, Take 1 capsule by mouth Daily., Disp: , Rfl:   •  vitamin B-12 (VITAMIN B-12) 1000 MCG tablet, Take 1 tablet by mouth Daily., Disp: , Rfl:     ALLERGIES  No Known Allergies    FAMILY HISTORY:  Family History   Problem Relation Age of Onset   • Hypertension Mother    • Carpal tunnel syndrome Mother    • Heart valve disorder Mother         heart valve replacement 2008   • Stroke Father    • Childhood respiratory disease Father    • Tuberculosis Father    • Lung disease Father    • Lung cancer Sister    • Alzheimer's disease Sister    • Inflammatory bowel disease Brother    • No Known Problems Sister    • Colon cancer Neg Hx    • Colon polyps Neg Hx        SOCIAL HISTORY  Social History     Socioeconomic History   • Marital status:      Spouse name: Not on file   • Number of children: Not on file   • Years of education: Not on file   • Highest education level: Not on file   Tobacco Use   • Smoking status: Current Every Day Smoker     Packs/day: 1.00     Years: 50.00     Pack years: 50.00     Types: Cigarettes     Start date: 1967   • Smokeless tobacco: Never Used   Substance and Sexual Activity   • Alcohol use: No   • Drug use: No   • Sexual activity: Defer   Social History Narrative    Caffeine: 7-8 or  more cups coffee daily       REVIEW OF SYSTEMS  Review of Systems   Constitutional: Negative for activity change, appetite change, chills, diaphoresis, fatigue, fever, unexpected weight gain and unexpected weight loss.   HENT: Negative for congestion, dental problem, drooling, ear discharge, ear pain, facial swelling, hearing loss, mouth sores, nosebleeds, postnasal drip, rhinorrhea, sinus pressure, sneezing, sore throat, swollen glands, tinnitus, trouble swallowing and voice change.    Respiratory: Negative for apnea, cough, choking, chest tightness, shortness of breath, wheezing and stridor.    Cardiovascular: Negative for chest pain, palpitations and leg swelling.   Gastrointestinal: Positive for diarrhea. Negative for abdominal distention, abdominal pain, anal bleeding, blood in stool, constipation, nausea, rectal pain, vomiting, GERD and indigestion.   Endocrine: Negative for cold intolerance, heat intolerance, polydipsia, polyphagia and polyuria.   Musculoskeletal: Negative for arthralgias, back pain, gait problem, joint swelling, myalgias, neck pain, neck stiffness and bursitis.   Skin: Negative for color change, dry skin, rash and skin lesions.   Allergic/Immunologic: Negative for environmental allergies, food allergies and immunocompromised state.   Neurological: Negative for dizziness, tremors, seizures, syncope, facial asymmetry, speech difficulty, weakness, light-headedness, numbness, headache, memory problem and confusion.   Hematological: Negative for adenopathy. Does not bruise/bleed easily.   Psychiatric/Behavioral: Negative for agitation, behavioral problems, decreased concentration, dysphoric mood, hallucinations, self-injury, sleep disturbance, suicidal ideas, negative for hyperactivity, depressed mood and stress. The patient is not nervous/anxious.          PHYSICAL EXAM   BP (!) 186/87 (BP Location: Right arm, Patient Position: Sitting, Cuff Size: Adult)   Pulse 78   Temp 97.7 °F (36.5 °C)  "(Temporal)   Ht 172.7 cm (67.99\")   Wt 62 kg (136 lb 9.6 oz)   BMI 20.77 kg/m²   Physical Exam  Vitals signs and nursing note reviewed.   Constitutional:       Appearance: Normal appearance. He is well-developed.   HENT:      Head: Normocephalic and atraumatic.      Nose: Nose normal.      Mouth/Throat:      Mouth: Mucous membranes are moist.      Pharynx: Oropharynx is clear.   Eyes:      Pupils: Pupils are equal, round, and reactive to light.   Neck:      Musculoskeletal: Normal range of motion and neck supple.   Cardiovascular:      Rate and Rhythm: Normal rate and regular rhythm.   Pulmonary:      Effort: Pulmonary effort is normal.      Breath sounds: Normal breath sounds. No wheezing or rales.   Abdominal:      General: Bowel sounds are normal.      Palpations: Abdomen is soft. There is no mass.      Tenderness: There is no abdominal tenderness. There is no guarding or rebound.      Hernia: No hernia is present.   Musculoskeletal: Normal range of motion.   Skin:     General: Skin is warm and dry.   Neurological:      Mental Status: He is alert and oriented to person, place, and time.      Cranial Nerves: No cranial nerve deficit.   Psychiatric:         Behavior: Behavior normal.         Judgment: Judgment normal.       Results Review:  None    ASSESSMENT / PLAN  1.  Diarrhea  Given his description of the medication that he took, 3 small pills daily, it is likely that this was Entocort or budesonide and therefore would indicate that he was diagnosed with lymphocytic colitis in 2012.  I would like to get his most recent colonoscopy report and pathology if available from [facility] and review.  For now we will treat with cholestyramine powder and consider colonoscopy if no improvement in his symptoms.  -Cholestyramine powder 4 g daily  -Obtain records from [facility]    Return in about 4 weeks (around 2/12/2021).    I discussed the patients findings and my recommendations with patient    Rudy Dominguez, " APRN

## 2021-03-01 ENCOUNTER — OFFICE VISIT (OUTPATIENT)
Dept: ORTHOPEDIC SURGERY | Facility: CLINIC | Age: 76
End: 2021-03-01

## 2021-03-01 ENCOUNTER — TELEPHONE (OUTPATIENT)
Dept: ORTHOPEDIC SURGERY | Facility: CLINIC | Age: 76
End: 2021-03-01

## 2021-03-01 VITALS — BODY MASS INDEX: 20.16 KG/M2 | HEIGHT: 68 IN | WEIGHT: 133 LBS | HEART RATE: 89 BPM | OXYGEN SATURATION: 94 %

## 2021-03-01 DIAGNOSIS — R29.898 WEAKNESS OF RIGHT LEG: ICD-10-CM

## 2021-03-01 DIAGNOSIS — G89.29 CHRONIC PAIN OF RIGHT KNEE: Primary | ICD-10-CM

## 2021-03-01 DIAGNOSIS — M25.561 CHRONIC PAIN OF RIGHT KNEE: Primary | ICD-10-CM

## 2021-03-01 PROCEDURE — 99213 OFFICE O/P EST LOW 20 MIN: CPT | Performed by: ORTHOPAEDIC SURGERY

## 2021-03-01 NOTE — TELEPHONE ENCOUNTER
DONALD FROM [facility] CALLED STATING THAT DIAGNOSIS OF CHRONIC KNEE PAIN IS CONSIDERED A SYMPTOM AND THEY NEED TO KNOW WHAT IS CAUSING THE KNEE PAIN OR WEAKNESS. SHE WOULD LIKE A CALL BACK [phone]

## 2021-03-01 NOTE — PROGRESS NOTES
"    [facility]        Subjective     CC: Pain of the Right Knee      HPI    Martin Moctezuma is a 75 y.o. male who presents with new problem of: right knee pain.  Onset: atraumatic and gradual in nature. The issue has been ongoing for 2 month(s). Pain is a 2/10 on the pain scale. Pain is described as aching. Associated symptoms include pain, stiffness and giving way/buckling. The pain is worse with walking and sleeping; pain medication and/or NSAID improve the pain. Previous treatments have included: physical therapy.    I have reviewed the following portions of the patient's history:History of Present Illness and review of systems.      Patient is here today for new problem today regarding his right knee.  He says that for the last couple months, he feels like his right knee wants to give way.  He is legally blind.  He uses a navigation stick.  He is scheduled to get a seeing eye dog but is concerned that his lack of balance and the fact that his right knee wants to give way is going to negatively affect that process.  He is here with his brother Gary today.      ROS:    Constiutional:Pt denies fever, chills, nausea, or vomiting.  MSK:as above        Objective      Past Medical History  Past Medical History:   Diagnosis Date   • Anxiety    • Coronary artery disease    • Depression    • Diabetes mellitus (CMS/H[facility])    • Diabetes type 1, controlled (CMS/HCC)    • Diabetic retinopathy (CMS/HCC)    • Hypertension    • Hypoglycemia due to type 1 diabetes mellitus (CMS/[facility]CC)    • Injury of back    • Legally blind    • Microalbuminuria    • Myocardial infarction (CMS/HCC)    • Stroke (CMS/HCC)    • Type 1 diabetes mellitus, uncontrolled (CMS/HCC)          Physical Exam  Pulse 89   Ht 172.7 cm (67.99\")   Wt 60.3 kg (133 lb)   SpO2 94%   BMI 20.23 kg/m²     Body mass index is 20.23 kg/m².    Patient is well nourished and well developed.        Ortho Exam  Right lower extremity: Patient is able to " do straight leg raise.  He has 5 out of 5 quadricep and hamstring strength.  His knee is stable ligamentously.  There is no extensor lag noted.  Range of motion 0-125.  No knee effusion.    Imaging/Labs/EMG Reviewed:  Imaging Results (Last 24 Hours)     Procedure Component Value Units Date/Time    XR Knee 4+ View Right [[id number]] Resulted: 03/01/21 0922     Updated: 03/01/21 0923    Narrative:      Knee X-Ray    Indication: Pain    Study:  Upright AP, Skiers, Lateral, and Sunrise views of Right knee(s)    Comparison: None    Findings:    Patient appears to have mild degenerative changes in the medial   compartment.    There are mild degenerative changes in the lateral compartment.    There are early moderate changes in the patellofemoral compartment.    Patient has overall neutral alignment.    There are surgical clips seen in the posterior soft tissues of the leg.      Impression:   Mild lateral and medial compartment and early moderate patellofemoral   compartment degnerative changes of the knee   Postsurgical clips seen in the soft tissues of the posterior aspect of the   knee.              Assessment    Assessment:  1. Chronic pain of right knee    2. Weakness of right leg        Plan:  1. Recommend over the counter anti-inflammatories for pain and/or swelling  2. Chronic pain right lower extremity with a subjective complaint of his knee giving way.  He also has weakness of the right leg.  This is the side that he had his inner troch fracture fixed with a short nail.  I do not suspect that this should have caused any long-term weakness in the right lower extremity.  Patient tells me that he has had vascular studies that showed 40% blood flow on the left and 100% blood flow on the right.  I have recommended a course of physical therapy.  Secondary to his blindness, we will order home health.  I will see him back in a couple months we will see how he is doing overall.      Titus Woody,  MD  03/01/21  09:25 EST      Dragon disclaimer:  Much of this encounter note is an electronic transcription/translation of spoken language to printed text. The electronic translation of spoken language may permit erroneous, or at times, nonsensical words or phrases to be inadvertently transcribed; Although I have reviewed the note for such errors, some may still exist.

## 2021-03-02 NOTE — TELEPHONE ENCOUNTER
I called and spoke with jessie and she is going to try and use the chronic pain diagnosis and call us back if this does not work.  Megan

## (undated) DEVICE — MEDI-VAC NON-CONDUCTIVE SUCTION TUBING: Brand: CARDINAL HEALTH

## (undated) DEVICE — PK MAJ FX HIP 10

## (undated) DEVICE — PROXIMATE RH ROTATING HEAD SKIN STAPLERS (35 WIDE) CONTAINS 35 STAINLESS STEEL STAPLES: Brand: PROXIMATE

## (undated) DEVICE — SUCTION CANISTER, 2500CC, RIGID: Brand: DEROYAL

## (undated) DEVICE — LEGGINGS, PAIR, 29X43, STERILE: Brand: MEDLINE

## (undated) DEVICE — GLV SURG SENSICARE MICRO PF LF 6 STRL

## (undated) DEVICE — CANNULA,OXY,ADULT,SUPERSOFT,W/7'TUB,UC: Brand: MEDLINE

## (undated) DEVICE — AIRWY SZ11

## (undated) DEVICE — SOL LR 1000ML

## (undated) DEVICE — BIT DRL 3FLUT QC CALIB 4.2X330X100MM

## (undated) DEVICE — SPK10295 ORTHOPEDIC FRACTURE KIT: Brand: SPK10295 ORTHOPEDIC FRACTURE KIT

## (undated) DEVICE — GW FOR TROCH NAIL 3.2X400MM

## (undated) DEVICE — Device

## (undated) DEVICE — CVR HNDL LT SURG ACCSSRY BLU STRL

## (undated) DEVICE — ENCORE® LATEX MICRO SIZE 8, STERILE LATEX POWDER-FREE SURGICAL GLOVE: Brand: ENCORE

## (undated) DEVICE — SNAP KOVER: Brand: UNBRANDED

## (undated) DEVICE — SUT ETHIB 0/0 MO6 I8IN CX45D

## (undated) DEVICE — ANTIBACTERIAL UNDYED BRAIDED (POLYGLACTIN 910), SYNTHETIC ABSORBABLE SUTURE: Brand: COATED VICRYL

## (undated) DEVICE — DRSNG SURG AQUACEL AG 9X15CM

## (undated) DEVICE — MEDI-VAC YANKAUER SUCTION HANDLE W/BULBOUS TIP: Brand: CARDINAL HEALTH